# Patient Record
Sex: FEMALE | Race: WHITE | NOT HISPANIC OR LATINO | Employment: OTHER | URBAN - METROPOLITAN AREA
[De-identification: names, ages, dates, MRNs, and addresses within clinical notes are randomized per-mention and may not be internally consistent; named-entity substitution may affect disease eponyms.]

---

## 2017-04-10 ENCOUNTER — TELEPHONE (OUTPATIENT)
Dept: FAMILY MEDICINE | Facility: OTHER | Age: 77
End: 2017-04-10

## 2017-05-08 DIAGNOSIS — N18.30 CKD (CHRONIC KIDNEY DISEASE) STAGE 3, GFR 30-59 ML/MIN (H): ICD-10-CM

## 2017-05-08 DIAGNOSIS — I10 ESSENTIAL HYPERTENSION WITH GOAL BLOOD PRESSURE LESS THAN 140/90: ICD-10-CM

## 2017-05-08 DIAGNOSIS — E78.5 HYPERLIPIDEMIA, UNSPECIFIED: ICD-10-CM

## 2017-05-08 DIAGNOSIS — E11.9 TYPE 2 DIABETES MELLITUS WITHOUT COMPLICATION (H): ICD-10-CM

## 2017-05-08 LAB
ALBUMIN SERPL-MCNC: 3.3 G/DL (ref 3.4–5)
ALP SERPL-CCNC: 97 U/L (ref 40–150)
ALT SERPL W P-5'-P-CCNC: 20 U/L (ref 0–50)
ANION GAP SERPL CALCULATED.3IONS-SCNC: 9 MMOL/L (ref 3–14)
AST SERPL W P-5'-P-CCNC: 15 U/L (ref 0–45)
BILIRUB SERPL-MCNC: 0.4 MG/DL (ref 0.2–1.3)
BUN SERPL-MCNC: 23 MG/DL (ref 7–30)
CALCIUM SERPL-MCNC: 10.1 MG/DL (ref 8.5–10.1)
CHLORIDE SERPL-SCNC: 106 MMOL/L (ref 94–109)
CHOLEST SERPL-MCNC: 152 MG/DL
CO2 SERPL-SCNC: 28 MMOL/L (ref 20–32)
CREAT SERPL-MCNC: 1 MG/DL (ref 0.52–1.04)
CREAT UR-MCNC: 133 MG/DL
ERYTHROCYTE [DISTWIDTH] IN BLOOD BY AUTOMATED COUNT: 12.7 % (ref 10–15)
GFR SERPL CREATININE-BSD FRML MDRD: 54 ML/MIN/1.7M2
GLUCOSE SERPL-MCNC: 148 MG/DL (ref 70–99)
HBA1C MFR BLD: 6.9 % (ref 4.3–6)
HCT VFR BLD AUTO: 44 % (ref 35–47)
HDLC SERPL-MCNC: 69 MG/DL
HGB BLD-MCNC: 14.5 G/DL (ref 11.7–15.7)
LDLC SERPL CALC-MCNC: 61 MG/DL
MCH RBC QN AUTO: 30.7 PG (ref 26.5–33)
MCHC RBC AUTO-ENTMCNC: 33 G/DL (ref 31.5–36.5)
MCV RBC AUTO: 93 FL (ref 78–100)
MICROALBUMIN UR-MCNC: 30 MG/L
MICROALBUMIN/CREAT UR: 22.41 MG/G CR (ref 0–25)
NONHDLC SERPL-MCNC: 83 MG/DL
PLATELET # BLD AUTO: 222 10E9/L (ref 150–450)
POTASSIUM SERPL-SCNC: 3.8 MMOL/L (ref 3.4–5.3)
PROT SERPL-MCNC: 7.2 G/DL (ref 6.8–8.8)
RBC # BLD AUTO: 4.72 10E12/L (ref 3.8–5.2)
SODIUM SERPL-SCNC: 143 MMOL/L (ref 133–144)
TRIGL SERPL-MCNC: 108 MG/DL
WBC # BLD AUTO: 7.9 10E9/L (ref 4–11)

## 2017-05-08 PROCEDURE — 36415 COLL VENOUS BLD VENIPUNCTURE: CPT | Performed by: FAMILY MEDICINE

## 2017-05-08 PROCEDURE — 80061 LIPID PANEL: CPT | Performed by: FAMILY MEDICINE

## 2017-05-08 PROCEDURE — 83036 HEMOGLOBIN GLYCOSYLATED A1C: CPT | Performed by: FAMILY MEDICINE

## 2017-05-08 PROCEDURE — 82043 UR ALBUMIN QUANTITATIVE: CPT | Performed by: FAMILY MEDICINE

## 2017-05-08 PROCEDURE — 85027 COMPLETE CBC AUTOMATED: CPT | Performed by: FAMILY MEDICINE

## 2017-05-08 PROCEDURE — 80053 COMPREHEN METABOLIC PANEL: CPT | Performed by: FAMILY MEDICINE

## 2017-06-02 DIAGNOSIS — I25.83 CORONARY ARTERY DISEASE DUE TO LIPID RICH PLAQUE: ICD-10-CM

## 2017-06-02 DIAGNOSIS — E78.5 HYPERLIPIDEMIA LDL GOAL <100: ICD-10-CM

## 2017-06-02 DIAGNOSIS — I25.10 CORONARY ARTERY DISEASE DUE TO LIPID RICH PLAQUE: ICD-10-CM

## 2017-06-02 NOTE — TELEPHONE ENCOUNTER
atorvastatin (LIPITOR) 40 MG tablet     Last Written Prescription Date: 6/14/16  Last Fill Quantity: 90, # refills: 3  Last Office Visit with [facility], [facility] or [facility] prescribing provider: 9/23/16       Lab Results   Component Value Date    CHOL 152 05/08/2017     Lab Results   Component Value Date    HDL 69 05/08/2017     Lab Results   Component Value Date    LDL 61 05/08/2017     Lab Results   Component Value Date    TRIG 108 05/08/2017     Lab Results   Component Value Date    CHOLHDLRATIO 2.7 06/04/2015

## 2017-06-05 RX ORDER — ATORVASTATIN CALCIUM 40 MG/1
40 TABLET, FILM COATED ORAL DAILY
Qty: 90 TABLET | Refills: 0 | Status: SHIPPED | OUTPATIENT
Start: 2017-06-05 | End: 2017-08-30

## 2017-06-05 NOTE — TELEPHONE ENCOUNTER
Prescription approved per [facility] Refill Protocol.  Will need OV in sept.    Chuckie Hanks, RN, BSN

## 2017-06-14 DIAGNOSIS — I25.10 CORONARY ARTERY DISEASE DUE TO LIPID RICH PLAQUE: ICD-10-CM

## 2017-06-14 DIAGNOSIS — I25.83 CORONARY ARTERY DISEASE DUE TO LIPID RICH PLAQUE: ICD-10-CM

## 2017-06-14 NOTE — TELEPHONE ENCOUNTER
Atenolol      Last Written Prescription Date: 6/14/2016  Last Fill Quantity: 90, # refills: 3    Last Office Visit with G, [facility] or [facility] prescribing provider:  9/23/2016   Future Office Visit:  none      BP Readings from Last 3 Encounters:   09/23/16 122/60   07/05/16 126/60   05/18/16 134/60     Yuki Hamm MA

## 2017-06-15 RX ORDER — ATENOLOL 100 MG/1
TABLET ORAL
Qty: 90 TABLET | Refills: 0 | Status: SHIPPED | OUTPATIENT
Start: 2017-06-15 | End: 2017-09-12 | Stop reason: ALTCHOICE

## 2017-06-15 NOTE — TELEPHONE ENCOUNTER
Prescription approved per [facility] Refill Protocol.    Dariana Rinaldi, RN, BSN

## 2017-06-28 ENCOUNTER — TELEPHONE (OUTPATIENT)
Dept: FAMILY MEDICINE | Facility: OTHER | Age: 77
End: 2017-06-28

## 2017-06-28 DIAGNOSIS — N32.81 OVERACTIVE BLADDER: ICD-10-CM

## 2017-06-28 NOTE — TELEPHONE ENCOUNTER
ditropan      Last Written Prescription Date: 06/14/16  Last Fill Quantity: 90,  # refills: 3   Last Office Visit with G, [facility] or [facility] prescribing provider: 09/23/16

## 2017-06-28 NOTE — LETTER
[address]  [facility] [address]  [phone]        August 30, 2017    Loni Ybarra  [address] [facility] [address]              Dear Loni Ybarra    This is to remind you that your now zimmer to schedule your office visit.     You may call our office at [phone] to schedule an appointment.    Please disregard this notice if you have already had your labs drawn or made an appointment.        Sincerely,        Mackenzie Jerome MD/pk

## 2017-07-03 RX ORDER — OXYBUTYNIN CHLORIDE 10 MG/1
TABLET, EXTENDED RELEASE ORAL
Qty: 90 TABLET | Refills: 0 | Status: SHIPPED | OUTPATIENT
Start: 2017-07-03 | End: 2017-09-12

## 2017-07-03 NOTE — TELEPHONE ENCOUNTER
Prescription approved per [facility] Refill Protocol.  Due for OV in Sept.  Please call and help schedule.  Chuckie Hanks, RN, BSN

## 2017-07-05 NOTE — TELEPHONE ENCOUNTER
Several attempts made w/o success.  Not due until September.  Will postpone message until closer to be seen to be sure she schedules.

## 2017-08-21 DIAGNOSIS — I10 ESSENTIAL HYPERTENSION WITH GOAL BLOOD PRESSURE LESS THAN 140/90: ICD-10-CM

## 2017-08-21 NOTE — TELEPHONE ENCOUNTER
Losartan      Last Written Prescription Date: 6/14/2016  Last Fill Quantity: 90, # refills: 3  Last Office Visit with [facility], [facility] or [facility] prescribing provider: 9/23/2016       Potassium   Date Value Ref Range Status   05/08/2017 3.8 3.4 - 5.3 mmol/L Final     Creatinine   Date Value Ref Range Status   05/08/2017 1.00 0.52 - 1.04 mg/dL Final     BP Readings from Last 3 Encounters:   09/23/16 122/60   07/05/16 126/60   05/18/16 134/60       Hygroton      Last Written Prescription Date: 6/14/2016  Last Fill Quantity: 90, # refills: 3  Last Office Visit with [facility], [facility] or [facility] prescribing provider: 9/23/2016       Potassium   Date Value Ref Range Status   05/08/2017 3.8 3.4 - 5.3 mmol/L Final     Creatinine   Date Value Ref Range Status   05/08/2017 1.00 0.52 - 1.04 mg/dL Final     BP Readings from Last 3 Encounters:   09/23/16 122/60   07/05/16 126/60   05/18/16 134/60       Yuki Hamm MA

## 2017-08-21 NOTE — LETTER
[address]  [facility] [address] [phone]        August 30, 2017    Loni Ybarra  [address] [facility] [address]          Dear Loni,    We were unable to reach you by phone.  This is just a reminder that you are now due to come in for an office visit.  You can schedule this by calling us at your convenience at [phone].    Sincerely,        Mackenzie Jerome MD/pk

## 2017-08-22 RX ORDER — CHLORTHALIDONE 25 MG/1
TABLET ORAL
Qty: 90 TABLET | Refills: 3 | Status: SHIPPED | OUTPATIENT
Start: 2017-08-22 | End: 2018-05-25

## 2017-08-22 RX ORDER — LOSARTAN POTASSIUM 100 MG/1
TABLET ORAL
Qty: 90 TABLET | Refills: 3 | Status: SHIPPED | OUTPATIENT
Start: 2017-08-22 | End: 2018-05-25

## 2017-08-22 NOTE — TELEPHONE ENCOUNTER
Routing refill request to provider for review/approval because:  Appears to be a break in medication - should have been out around 6/14/17    Dariana Rinaldi, RN, BSN

## 2017-08-22 NOTE — TELEPHONE ENCOUNTER
Left detailed message stating that her losartan and hygroton have bee sent to the pharmacy for her.  Yuki Hamm MA

## 2017-08-30 DIAGNOSIS — I25.10 CORONARY ARTERY DISEASE DUE TO LIPID RICH PLAQUE: ICD-10-CM

## 2017-08-30 DIAGNOSIS — E78.5 HYPERLIPIDEMIA LDL GOAL <100: ICD-10-CM

## 2017-08-30 DIAGNOSIS — I25.83 CORONARY ARTERY DISEASE DUE TO LIPID RICH PLAQUE: ICD-10-CM

## 2017-08-30 NOTE — TELEPHONE ENCOUNTER
lipitor     Last Written Prescription Date: 06/05/17  Last Fill Quantity: 90, # refills: 0  Last Office Visit with G, P or [facility] prescribing provider: 09/23/16       Lab Results   Component Value Date    CHOL 152 05/08/2017     Lab Results   Component Value Date    HDL 69 05/08/2017     Lab Results   Component Value Date    LDL 61 05/08/2017     Lab Results   Component Value Date    TRIG 108 05/08/2017     Lab Results   Component Value Date    CHOLHDLRATIO 2.7 06/04/2015

## 2017-09-01 RX ORDER — ATORVASTATIN CALCIUM 40 MG/1
40 TABLET, FILM COATED ORAL DAILY
Qty: 30 TABLET | Refills: 0 | Status: SHIPPED | OUTPATIENT
Start: 2017-09-01 | End: 2017-09-12

## 2017-09-01 NOTE — TELEPHONE ENCOUNTER
atorvastatin (LIPITOR) 40 MG tablet  Routing refill request to provider for review/approval because:  Sharon given x1 and patient did not follow up, please advise  Patient needs to be seen because:  Due for OV    Nikki Raya RN, BSN

## 2017-09-08 NOTE — PROGRESS NOTES
SUBJECTIVE:                                                    Loni Ybarra is a 76 year old female who presents to clinic today for the following health issues:    HPI    Diabetes Follow-up       Patient is checking blood sugars: rarely since A1c in May was nearly normal     Diabetic concerns: None     Symptoms of hypoglycemia (low blood sugar): none     Paresthesias (numbness or burning in feet) or sores: No     Date of last diabetic eye exam: May or  of this year ([facility] [phone])    Hyperlipidemia Follow-Up      Rate your low fat/cholesterol diet?: good    Taking statin?  Yes, no muscle aches from statin    Other lipid medications/supplements?:  none    Hypertension Follow-up      Outpatient blood pressures are being checked at store.      Low Salt Diet: no added salt      Problem list and histories reviewed & adjusted, as indicated.  Additional history: as documented    Patient Active Problem List   Diagnosis     Hypertension, goal below 140/90     Hyperlipidemia, unspecified     Advanced directives, counseling/discussion     CAD (coronary artery disease)     Obesity     CKD (chronic kidney disease) stage 3, GFR 30-59 ml/min     Overactive bladder     CHACHO (obstructive sleep apnea)     Type 2 diabetes mellitus without complication (H)     Past Surgical History:   Procedure Laterality Date     C  DELIVERY ONLY      , Low Cervical     CARDIAC SURGERY  2011    2 stents placed     CHOLECYSTECTOMY       TONSILLECTOMY      child     TUBAL LIGATION         Social History   Substance Use Topics     Smoking status: Never Smoker     Smokeless tobacco: Never Used      Comment: no smokers in household     Alcohol use Yes      Comment: kvng x1/<1xper month     Family History   Problem Relation Age of Onset     C.A.D. Mother      3 vessel CABG     Hypertension Mother      ON MEDICATION     CEREBROVASCULAR DISEASE Mother      Arthritis Mother      Eye Disorder Mother   "    CATARACT     GASTROINTESTINAL DISEASE Mother      ULCER     Lipids Mother      Thyroid Disease Mother      ?     CANCER Father      LIVER CANCER     Allergies Brother      SEASONAL     Lipids Brother      CHACHO     Respiratory Brother      ASTHMA     CANCER Brother      prostate             ROS:  C: NEGATIVE for fever, chills, change in weight  E/M: NEGATIVE for ear, mouth and throat problems  R: NEGATIVE for significant cough or SOB  CV: NEGATIVE for chest pain, palpitations or peripheral edema    OBJECTIVE:     /80 (BP Location: Left arm, Patient Position: Chair, Cuff Size: Adult Large)  Pulse 78  Temp 98.1  F (36.7  C) (Oral)  Resp 18  Ht 5' 3.89\" (1.623 m)  Wt 195 lb (88.5 kg)  BMI 33.59 kg/m2  Body mass index is 33.59 kg/(m^2).  Gen: no apparent distress  NECK: no adenopathy, no asymmetry, no masses  Chest: clear to auscultation without wheeze, rale or rhonchi  Cor: regular rate and rhythm without murmur  ABDOMEN: soft, nontender, no masses and bowel sounds normal  Ext: warm and dry without edema  Psych: Alert and oriented times 3; coherent speech, normal   rate and volume, able to articulate logical thoughts, able   to abstract reason, no tangential thoughts, no hallucinations   or delusions  Her affect is bright.    ASSESSMENT/PLAN:     BMI:   Estimated body mass index is 33.59 kg/(m^2) as calculated from the following:    Height as of this encounter: 5' 3.89\" (1.623 m).    Weight as of this encounter: 195 lb (88.5 kg).   Weight management plan: Discussed healthy diet and exercise guidelines and patient will follow up in 6 months in clinic to re-evaluate.    1. Type 2 diabetes mellitus without complication, without long-term current use of insulin (H)  NOt checking sugars often, will check A1C today.    - FOOT EXAM  NO CHARGE [38532.114]  - Hemoglobin A1c    2. Coronary artery disease due to lipid rich plaque  Denies chest pain or angina.    - metoprolol (TOPROL-XL) 50 MG 24 hr tablet; Take 1 " tablet (50 mg) by mouth daily  Dispense: 90 tablet; Refill: 3  - atorvastatin (LIPITOR) 40 MG tablet; Take 1 tablet (40 mg) by mouth daily Overdue for appointment  Dispense: 90 tablet; Refill: 3    3. Overactive bladder  Stable.    - oxybutynin (DITROPAN-XL) 10 MG 24 hr tablet; Take 1 tablet (10 mg) by mouth daily  Dispense: 90 tablet; Refill: 3    4. Morbid obesity, unspecified obesity type (H)  Discussed exercise.    5. CKD (chronic kidney disease) stage 3, GFR 30-59 ml/min  BP controlled, avoid NSAIDs.    6. Hypertension, goal below 140/90  Well controlled    - metoprolol (TOPROL-XL) 50 MG 24 hr tablet; Take 1 tablet (50 mg) by mouth daily  Dispense: 90 tablet; Refill: 3    7. Hyperlipidemia, unspecified  Remains on statin    8. CHACHO (obstructive sleep apnea)  Continues with CPAP.    9. Need for prophylactic vaccination and inoculation against influenza      Mackenzie BRIDGETT Jerome MD  [facility]

## 2017-09-12 ENCOUNTER — OFFICE VISIT (OUTPATIENT)
Dept: FAMILY MEDICINE | Facility: OTHER | Age: 77
End: 2017-09-12
Payer: MEDICARE

## 2017-09-12 VITALS
TEMPERATURE: 98.1 F | SYSTOLIC BLOOD PRESSURE: 126 MMHG | RESPIRATION RATE: 18 BRPM | WEIGHT: 195 LBS | HEIGHT: 64 IN | HEART RATE: 78 BPM | DIASTOLIC BLOOD PRESSURE: 80 MMHG | BODY MASS INDEX: 33.29 KG/M2

## 2017-09-12 DIAGNOSIS — I25.10 CORONARY ARTERY DISEASE DUE TO LIPID RICH PLAQUE: ICD-10-CM

## 2017-09-12 DIAGNOSIS — N32.81 OVERACTIVE BLADDER: ICD-10-CM

## 2017-09-12 DIAGNOSIS — G47.33 OSA (OBSTRUCTIVE SLEEP APNEA): ICD-10-CM

## 2017-09-12 DIAGNOSIS — E11.9 TYPE 2 DIABETES MELLITUS WITHOUT COMPLICATION, WITHOUT LONG-TERM CURRENT USE OF INSULIN (H): Primary | ICD-10-CM

## 2017-09-12 DIAGNOSIS — I25.83 CORONARY ARTERY DISEASE DUE TO LIPID RICH PLAQUE: ICD-10-CM

## 2017-09-12 DIAGNOSIS — E66.01 MORBID OBESITY, UNSPECIFIED OBESITY TYPE (H): ICD-10-CM

## 2017-09-12 DIAGNOSIS — N18.30 CKD (CHRONIC KIDNEY DISEASE) STAGE 3, GFR 30-59 ML/MIN (H): ICD-10-CM

## 2017-09-12 DIAGNOSIS — I10 HYPERTENSION, GOAL BELOW 140/90: ICD-10-CM

## 2017-09-12 DIAGNOSIS — Z23 NEED FOR PROPHYLACTIC VACCINATION AND INOCULATION AGAINST INFLUENZA: ICD-10-CM

## 2017-09-12 DIAGNOSIS — E78.5 HYPERLIPIDEMIA, UNSPECIFIED: ICD-10-CM

## 2017-09-12 LAB — HBA1C MFR BLD: 7.2 % (ref 4.3–6)

## 2017-09-12 PROCEDURE — 99207 C FOOT EXAM  NO CHARGE: CPT | Mod: 25 | Performed by: FAMILY MEDICINE

## 2017-09-12 PROCEDURE — 83036 HEMOGLOBIN GLYCOSYLATED A1C: CPT | Performed by: FAMILY MEDICINE

## 2017-09-12 PROCEDURE — 99214 OFFICE O/P EST MOD 30 MIN: CPT | Mod: 25 | Performed by: FAMILY MEDICINE

## 2017-09-12 PROCEDURE — 90662 IIV NO PRSV INCREASED AG IM: CPT | Performed by: FAMILY MEDICINE

## 2017-09-12 PROCEDURE — 36415 COLL VENOUS BLD VENIPUNCTURE: CPT | Performed by: FAMILY MEDICINE

## 2017-09-12 PROCEDURE — G0008 ADMIN INFLUENZA VIRUS VAC: HCPCS | Performed by: FAMILY MEDICINE

## 2017-09-12 RX ORDER — OXYBUTYNIN CHLORIDE 10 MG/1
10 TABLET, EXTENDED RELEASE ORAL DAILY
Qty: 90 TABLET | Refills: 3 | Status: SHIPPED | OUTPATIENT
Start: 2017-09-12 | End: 2018-09-19

## 2017-09-12 RX ORDER — METOPROLOL SUCCINATE 50 MG/1
50 TABLET, EXTENDED RELEASE ORAL DAILY
Qty: 90 TABLET | Refills: 3 | Status: SHIPPED | OUTPATIENT
Start: 2017-09-12 | End: 2017-12-15

## 2017-09-12 RX ORDER — ATORVASTATIN CALCIUM 40 MG/1
40 TABLET, FILM COATED ORAL DAILY
Qty: 90 TABLET | Refills: 3 | Status: SHIPPED | OUTPATIENT
Start: 2017-09-12 | End: 2018-09-20

## 2017-09-12 NOTE — MR AVS SNAPSHOT
After Visit Summary   9/12/2017    Loni Ybarra    MRN: [id number]           Patient Information     Date Of Birth          1940        Visit Information        Provider Department      9/12/2017 11:40 AM Mackenzie Jerome MD [facility]        Today's Diagnoses     Type 2 diabetes mellitus without complication, without long-term current use of insulin (H)    -  1    Coronary artery disease due to lipid rich plaque        Overactive bladder        Morbid obesity, unspecified obesity type (H)        CKD (chronic kidney disease) stage 3, GFR 30-59 ml/min        Hypertension, goal below 140/90        Hyperlipidemia, unspecified        CHACHO (obstructive sleep apnea)        Need for prophylactic vaccination and inoculation against influenza           Follow-ups after your visit        Who to contact     If you have questions or need follow up information about today's clinic visit or your schedule please contact [facility] directly at [phone].  Normal or non-critical lab and imaging results will be communicated to you by [facility], letter or phone within 4 business days after the clinic has received the results. If you do not hear from us within 7 days, please contact the clinic through [facility] or phone. If you have a critical or abnormal lab result, we will notify you by phone as soon as possible.  Submit refill requests through [facility] or call your pharmacy and they will forward the refill request to us. Please allow 3 business days for your refill to be completed.          Additional Information About Your Visit        MyChart Information     [facility] gives you secure access to your electronic health record. If you see a primary care provider, you can also send messages to your care team and make appointments. If you have questions, please call your primary care clinic.  If you do not have a primary care provider, please call [phone] and they will  "assist you.        Care EveryWhere ID     This is your Care EveryWhere ID. This could be used by other organizations to access your [place] medical records  [id number]        Your Vitals Were     Pulse Temperature Respirations Height BMI (Body Mass Index)       78 98.1  F (36.7  C) (Oral) 18 5' 3.89\" (1.623 m) 33.59 kg/m2        Blood Pressure from Last 3 Encounters:   09/12/17 126/80   09/23/16 122/60   07/05/16 126/60    Weight from Last 3 Encounters:   09/12/17 195 lb (88.5 kg)   10/04/16 196 lb (88.9 kg)   09/23/16 196 lb (88.9 kg)              We Performed the Following     FOOT EXAM  NO CHARGE [31066.114]     Hemoglobin A1c          Today's Medication Changes          These changes are accurate as of: 9/12/17 12:18 PM.  If you have any questions, ask your nurse or doctor.               Start taking these medicines.        Dose/Directions    metoprolol 50 MG 24 hr tablet   Commonly known as:  TOPROL-XL   Used for:  Coronary artery disease due to lipid rich plaque, Hypertension, goal below 140/90   Started by:  Mackenzie Jerome MD        Dose:  50 mg   Take 1 tablet (50 mg) by mouth daily   Quantity:  90 tablet   Refills:  3         These medicines have changed or have updated prescriptions.        Dose/Directions    oxybutynin 10 MG 24 hr tablet   Commonly known as:  DITROPAN-XL   This may have changed:  See the new instructions.   Used for:  Overactive bladder   Changed by:  Mackenzie Jerome MD        Dose:  10 mg   Take 1 tablet (10 mg) by mouth daily   Quantity:  90 tablet   Refills:  3         Stop taking these medicines if you haven't already. Please contact your care team if you have questions.     atenolol 100 MG tablet   Commonly known as:  TENORMIN   Stopped by:  Mackenzie Jerome MD                Where to get your medicines      These medications were sent to [facility] - [place], [place] - [address]  [address], [place] [address]     Phone:  [phone]    "  atorvastatin 40 MG tablet    metoprolol 50 MG 24 hr tablet    oxybutynin 10 MG 24 hr tablet                Primary Care Provider Office Phone # Fax #    Mackenzie BAILEY MD [place] [phone]       [address] [place] [address] [address]  [facility] [address]        Equal Access to Services     LOUIS SALEEM : Hadii jeanie ku hadasho Soomaali, waaxda luqadaha, qaybta kaalmada adeegyada, waxay maxxin hayajith flowerdianelysatilio cuba. So [facility] [phone].    ATENCIÓN: Si habla español, tiene a johnson disposición servicios gratuitos de asistencia lingüística. [facility] [phone].    We comply with applicable federal civil rights laws and [place] laws. We do not discriminate on the basis of race, color, national origin, age, disability sex, sexual orientation or gender identity.            Thank you!     Thank you for choosing [facility]  for your care. Our goal is always to provide you with excellent care. Hearing back from our patients is one way we can continue to improve our services. Please take a few minutes to complete the written survey that you may receive in the mail after your visit with us. Thank you!             Your Updated Medication List - Protect others around you: Learn how to safely use, store and throw away your medicines at www.disposemymeds.org.          This list is accurate as of: 9/12/17 12:18 PM.  Always use your most recent med list.                   Brand Name Dispense Instructions for use Diagnosis    aspirin 325 MG tablet     90 tablet    Take 1 tablet by mouth daily.        atorvastatin 40 MG tablet    LIPITOR    90 tablet    Take 1 tablet (40 mg) by mouth daily Overdue for appointment    Coronary artery disease due to lipid rich plaque       blood glucose monitoring test strip    no brand specified    100 each    Use to test blood sugars once daily or as directed    Newly diagnosed diabetes (H)       chlorhexidine 0.12 % solution    PERIDEX     Swish and spit 15 mLs in mouth 2 times  daily        chlorthalidone 25 MG tablet    HYGROTON    90 tablet    TAKE 1 TABLET BY MOUTH DAILY    Essential hypertension with goal blood pressure less than 140/90       losartan 100 MG tablet    COZAAR    90 tablet    TAKE 1 TABLET BY MOUTH DAILY    Essential hypertension with goal blood pressure less than 140/90       metoprolol 50 MG 24 hr tablet    TOPROL-XL    90 tablet    Take 1 tablet (50 mg) by mouth daily    Coronary artery disease due to lipid rich plaque, Hypertension, goal below 140/90       nitroGLYcerin 0.4 MG sublingual tablet    NITROSTAT    10 tablet    Place 1 tablet (0.4 mg) under the tongue every 5 minutes as needed    Coronary artery disease due to lipid rich plaque       ONE-A-DAY CALCIUM PLUS PO      Take 1 tablet by mouth        oxybutynin 10 MG 24 hr tablet    DITROPAN-XL    90 tablet    Take 1 tablet (10 mg) by mouth daily    Overactive bladder

## 2017-09-12 NOTE — PROGRESS NOTES
Injectable Influenza Immunization Documentation    1.  Are you sick today? (Fever of 100.5 or higher on the day of the clinic)   No    2.  Have you ever had Guillain-[place] Syndrome within 6 weeks of an influenza vaccionation?  No    3. Do you have a life-threatening allergy to eggs?  No    4. Do you have a life-threatening allergy to a component of the vaccine? May include antibiotics, gelatin or latex.  No     5. Have you ever had a reaction to a dose of flu vaccine that needed immediate medical attention?  No     Form completed by Loni Ybarra

## 2017-09-12 NOTE — NURSING NOTE
"Chief Complaint   Patient presents with     Recheck Medication     Panel Management     height, flu, fall risk, honoring choices, eye exam, foot exam, DM EDU       Initial /80 (BP Location: Left arm, Patient Position: Chair, Cuff Size: Adult Large)  Pulse 78  Temp 98.1  F (36.7  C) (Oral)  Resp 18  Ht 5' 3.89\" (1.623 m)  Wt 195 lb (88.5 kg)  BMI 33.59 kg/m2 Estimated body mass index is 33.59 kg/(m^2) as calculated from the following:    Height as of this encounter: 5' 3.89\" (1.623 m).    Weight as of this encounter: 195 lb (88.5 kg).  Medication Reconciliation: complete    "

## 2017-09-12 NOTE — NURSING NOTE
Prior to injection verified patient identity using patient's name and date of birth.Patient instructed to remain in clinic for 20 minutes afterwards, and to report any adverse reaction to me immediately. Elma Laguerre CMA

## 2017-10-14 ENCOUNTER — NURSE TRIAGE (OUTPATIENT)
Dept: NURSING | Facility: CLINIC | Age: 77
End: 2017-10-14

## 2017-10-14 NOTE — TELEPHONE ENCOUNTER
Started with frequency of urine about 3-4 days ago. Denies other symptoms at this time.   Diana Aleman RN, [facility]    Reason for Disposition    Urinating more frequently than usual (i.e., frequency)    Additional Information    Negative: Shock suspected (e.g., cold/pale/clammy skin, too weak to stand, low BP, rapid pulse)    Negative: Sounds like a life-threatening emergency to the triager    Negative: Followed a genital area injury    Negative: Followed a genital area injury (penis, scrotum)    Negative: Vaginal discharge    Negative: Pus (white, yellow) or bloody discharge from end of penis    Negative: [1] Taking antibiotic for urinary tract infection (UTI) AND [2] female    Negative: [1] Taking antibiotic for urinary tract infection (UTI) AND [2] male    Negative: [1] Discomfort (pain, burning or stinging) when passing urine AND [2] pregnant    Negative: [1] Discomfort (pain, burning or stinging) when passing urine AND [2] postpartum < 1 month    Negative: [1] Discomfort (pain, burning or stinging) when passing urine AND [2] female    Negative: [1] Discomfort (pain, burning or stinging) when passing urine AND [2] male    Negative: Pain or itching in the vulvar area    Negative: Pain in scrotum is main symptom    Negative: Blood in the urine is main symptom    Negative: Symptoms arising from use of a urinary catheter (Frank or Coude)    Negative: [1] Unable to urinate (or only a few drops) > 4 hours AND     [2] bladder feels very full (e.g., palpable bladder or strong urge to urinate)    Negative: [1] Decreased urination and [2] drinking very little AND [2] dehydration suspected (e.g., dark urine, no urine > 12 hours, very dry mouth, very lightheaded)    Negative: Patient sounds very sick or weak to the triager    Negative: Fever > 100.5 F (38.1 C)    Negative: Side (flank) or lower back pain present    Negative: [1] Can't control passage of urine (i.e., urinary incontinence) AND [2] new onset (< 2  weeks) or worsening    Protocols used: URINARY SYMPTOMS-ADULT-AH

## 2017-12-15 ENCOUNTER — TELEPHONE (OUTPATIENT)
Dept: FAMILY MEDICINE | Facility: OTHER | Age: 77
End: 2017-12-15

## 2017-12-15 DIAGNOSIS — I25.10 CORONARY ARTERY DISEASE DUE TO LIPID RICH PLAQUE: ICD-10-CM

## 2017-12-15 DIAGNOSIS — I25.83 CORONARY ARTERY DISEASE DUE TO LIPID RICH PLAQUE: ICD-10-CM

## 2017-12-15 RX ORDER — ATENOLOL 100 MG/1
TABLET ORAL
Qty: 90 TABLET | Refills: 3 | Status: SHIPPED | OUTPATIENT
Start: 2017-12-15 | End: 2018-09-21

## 2017-12-15 NOTE — TELEPHONE ENCOUNTER
Pt calling. She  her Atorvastatin and Atenolol and they were quite expensive. She is wondering if there is an alternative that may be cheaper? Or does she really need to be taking them? Please call.  Thank you,  Pennie Sullivan- Pt Rep.

## 2017-12-15 NOTE — TELEPHONE ENCOUNTER
Is it atenolol or metoprolol? It looks like she was changed to metoprolol in September. If the metoprolol is expensive, we can go back to atenolol. I would recommend PA for the Lipitor as she has been on this for years and needs to be on both medications.    Pieter Mathis PA-C

## 2017-12-15 NOTE — TELEPHONE ENCOUNTER
It was the metoprolol that was too expensive please send over atenolol, pharmacy entered, and then we will route to the PA department.

## 2017-12-15 NOTE — TELEPHONE ENCOUNTER
Unsure if there is an alternative or if this needs a PA? Please review/advise in TC absence. These were rx'd in 09/2017.

## 2018-03-19 NOTE — TELEPHONE ENCOUNTER
Attempted to contact pt, no answer and no VM.  Will attempt again later  Elma Laguerre CMA     
Reason for call:  Pt scheduled for lab 5/8 for A1C.  She would like TC to order any other labs for her to have done at that time if any are needed.  Please call to advise.  thanks    
She already has future orders placed for all of these (I placed these future orders last September)  Please let her know.  
Spoke to pt, informed her of message below.   
Will route to provider to review and advise. Looks like BMP, hgb, lipids are due 5/23/17. Rosario Tony, CMA    
(0) understands/communicates without difficulty

## 2018-05-09 DIAGNOSIS — I10 ESSENTIAL HYPERTENSION WITH GOAL BLOOD PRESSURE LESS THAN 140/90: ICD-10-CM

## 2018-05-10 RX ORDER — LOSARTAN POTASSIUM 100 MG/1
TABLET ORAL
Qty: 90 TABLET | OUTPATIENT
Start: 2018-05-10

## 2018-05-10 RX ORDER — CHLORTHALIDONE 25 MG/1
TABLET ORAL
Qty: 90 TABLET | OUTPATIENT
Start: 2018-05-10

## 2018-05-10 NOTE — TELEPHONE ENCOUNTER
Refill not appropriate.  Rx sent to the requesting pharmacy on 8/22/17 for a 3 month supply with an additional 3 refills. Pt won't need refills until August, 2018      Sandra Sanchez RN

## 2018-05-17 NOTE — PROGRESS NOTES
SUBJECTIVE:   Loni Ybarra is a 77 year old female who presents to clinic today for the following health issues:      History of Present Illness     CKD:     NSAID use::  Ibuprofen (Motrin)    Diabetes:     Frequency of checking blood sugars::  Other (ran out of test strips)    Diabetic concerns::  None    Hypoglycemia symptoms::  None    Paraesthesia present::  No    Eye Exam in the last year::  NO (Getting this done today)    Hyperlipidemia:     Low fat/chol diet rating::  Fair    Taking Statins::  YES    Side effects from hypolipidemia medication::  No muscle aches from Statin    Lipid Medications or Supplements::  None    Hypertension:     Outpatient blood pressures:  Are being checked (results have been good)    Blood pressures checked at:  Home    Dietary sodium intake::  Low salt diet  Frequency of exercise:  None  Taking medications regularly:  Yes  Additional concerns today:  No    Answers for HPI/ROS submitted by the patient on 2018   PHQ-2 Score: 0    Problem list and histories reviewed & adjusted, as indicated.  Additional history: as documented    Patient Active Problem List   Diagnosis     Hypertension, goal below 140/90     Hyperlipidemia, unspecified     Advanced directives, counseling/discussion     CAD (coronary artery disease)     Obesity     CKD (chronic kidney disease) stage 3, GFR 30-59 ml/min     Overactive bladder     CHACHO (obstructive sleep apnea)     Type 2 diabetes mellitus with stage 3 chronic kidney disease, without long-term current use of insulin (H)     Past Surgical History:   Procedure Laterality Date     C  DELIVERY ONLY      , Low Cervical     CARDIAC SURGERY  2011    2 stents placed     CHOLECYSTECTOMY  2008     TONSILLECTOMY      child     TUBAL LIGATION         Social History   Substance Use Topics     Smoking status: Never Smoker     Smokeless tobacco: Never Used      Comment: no smokers in household     Alcohol use Yes      Comment: kvng  "x1/<1xper month     Family History   Problem Relation Age of Onset     C.A.D. Mother      3 vessel CABG     Hypertension Mother      ON MEDICATION     CEREBROVASCULAR DISEASE Mother      Arthritis Mother      Eye Disorder Mother      CATARACT     GASTROINTESTINAL DISEASE Mother      ULCER     Lipids Mother      Thyroid Disease Mother      ?     CANCER Father      LIVER CANCER     Allergies Brother      SEASONAL     Lipids Brother      CHACHO     Respiratory Brother      ASTHMA     CANCER Brother      prostate           ROS:  CONSTITUTIONAL: NEGATIVE for fever, chills, change in weight  ENT/MOUTH: NEGATIVE for ear, mouth and throat problems  RESP: NEGATIVE for significant cough or shortness of breath   CV: NEGATIVE for chest pain, palpitations or peripheral edema  MSK:  right leg had buckling/sciatica when in [place], now since back in [place], the pain has resolved and no further buckling    OBJECTIVE:     /62  Pulse 64  Temp 97.8  F (36.6  C) (Temporal)  Ht 5' 3.5\" (1.613 m)  Wt 198 lb (89.8 kg)  SpO2 97%  Breastfeeding? No  BMI 34.52 kg/m2  Body mass index is 34.52 kg/(m^2).  Gen: no apparent distress  NECK: no adenopathy, no asymmetry, no masses  Chest: clear to auscultation without wheeze, rale or rhonchi  Cor: regular rate and rhythm without murmur  ABDOMEN: soft, nontender, no masses and bowel sounds normal  Ext: warm and dry without edema  Psych: Alert and oriented times 3; coherent speech, normal   rate and volume, able to articulate logical thoughts, able   to abstract reason, no tangential thoughts, no hallucinations   or delusions  Her affect is neutral    Diabetic foot exam: normal DP and PT pulses, no trophic changes or ulcerative lesions, normal sensory exam and normal monofilament exam     ASSESSMENT/PLAN:     BMI:   Estimated body mass index is 34.52 kg/(m^2) as calculated from the following:    Height as of this encounter: 5' 3.5\" (1.613 m).    Weight as of this encounter: 198 lb (89.8 kg). "   Weight management plan: Discussed healthy diet and exercise guidelines and patient will follow up in 6 months in clinic to re-evaluate.      1. Type 2 diabetes mellitus with stage 3 chronic kidney disease, without long-term current use of insulin (H)  We will check labs today.    - HEMOGLOBIN A1C  - Lipid panel reflex to direct LDL Non-fasting  - Albumin Random Urine Quantitative with Creat Ratio  - Comprehensive metabolic panel  - blood glucose monitoring (NO BRAND SPECIFIED) test strip; Use to test blood sugars once daily or as directed  Dispense: 100 each; Refill: 1  - thin (NO BRAND SPECIFIED) lancets; Use with lanceting device. To accompany: Blood Glucose Monitor Brands: per insurance.  Dispense: 100 each; Refill: 3    2. CKD (chronic kidney disease) stage 3, GFR 30-59 ml/min  Blood pressure controlled.  Will check labs.    - Hemoglobin  - Lipid panel reflex to direct LDL Non-fasting  - Albumin Random Urine Quantitative with Creat Ratio  - Comprehensive metabolic panel    3. Hyperlipidemia, unspecified hyperlipidemia type  Remains on statin.    - Lipid panel reflex to direct LDL Non-fasting  - Comprehensive metabolic panel    4. Essential hypertension with goal blood pressure less than 140/90  Well-controlled.    - chlorthalidone (HYGROTON) 25 MG tablet; Take 1 tablet (25 mg) by mouth daily  Dispense: 90 tablet; Refill: 3  - losartan (COZAAR) 100 MG tablet; Take 1 tablet (100 mg) by mouth daily  Dispense: 90 tablet; Refill: 3      Mackenzie Jerome MD  [facility]

## 2018-05-25 ENCOUNTER — TRANSFERRED RECORDS (OUTPATIENT)
Dept: HEALTH INFORMATION MANAGEMENT | Facility: CLINIC | Age: 78
End: 2018-05-25

## 2018-05-25 ENCOUNTER — OFFICE VISIT (OUTPATIENT)
Dept: FAMILY MEDICINE | Facility: OTHER | Age: 78
End: 2018-05-25
Payer: MEDICARE

## 2018-05-25 VITALS
WEIGHT: 198 LBS | SYSTOLIC BLOOD PRESSURE: 118 MMHG | OXYGEN SATURATION: 97 % | HEART RATE: 64 BPM | HEIGHT: 64 IN | DIASTOLIC BLOOD PRESSURE: 62 MMHG | TEMPERATURE: 97.8 F | BODY MASS INDEX: 33.8 KG/M2

## 2018-05-25 DIAGNOSIS — N18.30 TYPE 2 DIABETES MELLITUS WITH STAGE 3 CHRONIC KIDNEY DISEASE, WITHOUT LONG-TERM CURRENT USE OF INSULIN (H): Primary | ICD-10-CM

## 2018-05-25 DIAGNOSIS — E11.22 TYPE 2 DIABETES MELLITUS WITH STAGE 3 CHRONIC KIDNEY DISEASE, WITHOUT LONG-TERM CURRENT USE OF INSULIN (H): Primary | ICD-10-CM

## 2018-05-25 DIAGNOSIS — E78.5 HYPERLIPIDEMIA, UNSPECIFIED HYPERLIPIDEMIA TYPE: ICD-10-CM

## 2018-05-25 DIAGNOSIS — I10 ESSENTIAL HYPERTENSION WITH GOAL BLOOD PRESSURE LESS THAN 140/90: ICD-10-CM

## 2018-05-25 DIAGNOSIS — N18.30 CKD (CHRONIC KIDNEY DISEASE) STAGE 3, GFR 30-59 ML/MIN (H): ICD-10-CM

## 2018-05-25 LAB
ALBUMIN SERPL-MCNC: 3.4 G/DL (ref 3.4–5)
ALP SERPL-CCNC: 95 U/L (ref 40–150)
ALT SERPL W P-5'-P-CCNC: 24 U/L (ref 0–50)
ANION GAP SERPL CALCULATED.3IONS-SCNC: 7 MMOL/L (ref 3–14)
AST SERPL W P-5'-P-CCNC: 19 U/L (ref 0–45)
BILIRUB SERPL-MCNC: 0.5 MG/DL (ref 0.2–1.3)
BUN SERPL-MCNC: 23 MG/DL (ref 7–30)
CALCIUM SERPL-MCNC: 10.2 MG/DL (ref 8.5–10.1)
CHLORIDE SERPL-SCNC: 104 MMOL/L (ref 94–109)
CHOLEST SERPL-MCNC: 162 MG/DL
CO2 SERPL-SCNC: 30 MMOL/L (ref 20–32)
CREAT SERPL-MCNC: 1.13 MG/DL (ref 0.52–1.04)
CREAT UR-MCNC: 117 MG/DL
GFR SERPL CREATININE-BSD FRML MDRD: 47 ML/MIN/1.7M2
GLUCOSE SERPL-MCNC: 115 MG/DL (ref 70–99)
HBA1C MFR BLD: 7.7 % (ref 0–5.6)
HDLC SERPL-MCNC: 55 MG/DL
HGB BLD-MCNC: 14.3 G/DL (ref 11.7–15.7)
LDLC SERPL CALC-MCNC: 70 MG/DL
MICROALBUMIN UR-MCNC: 9 MG/L
MICROALBUMIN/CREAT UR: 7.43 MG/G CR (ref 0–25)
NONHDLC SERPL-MCNC: 107 MG/DL
POTASSIUM SERPL-SCNC: 4.3 MMOL/L (ref 3.4–5.3)
PROT SERPL-MCNC: 7.2 G/DL (ref 6.8–8.8)
SODIUM SERPL-SCNC: 141 MMOL/L (ref 133–144)
TRIGL SERPL-MCNC: 186 MG/DL

## 2018-05-25 PROCEDURE — 83036 HEMOGLOBIN GLYCOSYLATED A1C: CPT | Performed by: FAMILY MEDICINE

## 2018-05-25 PROCEDURE — 80053 COMPREHEN METABOLIC PANEL: CPT | Performed by: FAMILY MEDICINE

## 2018-05-25 PROCEDURE — 36415 COLL VENOUS BLD VENIPUNCTURE: CPT | Performed by: FAMILY MEDICINE

## 2018-05-25 PROCEDURE — 99214 OFFICE O/P EST MOD 30 MIN: CPT | Performed by: FAMILY MEDICINE

## 2018-05-25 PROCEDURE — 80061 LIPID PANEL: CPT | Performed by: FAMILY MEDICINE

## 2018-05-25 PROCEDURE — 82043 UR ALBUMIN QUANTITATIVE: CPT | Performed by: FAMILY MEDICINE

## 2018-05-25 PROCEDURE — 85018 HEMOGLOBIN: CPT | Performed by: FAMILY MEDICINE

## 2018-05-25 RX ORDER — LOSARTAN POTASSIUM 100 MG/1
100 TABLET ORAL DAILY
Qty: 90 TABLET | Refills: 3 | Status: SHIPPED | OUTPATIENT
Start: 2018-05-25 | End: 2019-05-21

## 2018-05-25 RX ORDER — CHLORTHALIDONE 25 MG/1
25 TABLET ORAL DAILY
Qty: 90 TABLET | Refills: 3 | Status: SHIPPED | OUTPATIENT
Start: 2018-05-25 | End: 2019-06-04

## 2018-05-25 RX ORDER — LOSARTAN POTASSIUM 100 MG/1
100 TABLET ORAL DAILY
Qty: 90 TABLET | Refills: 3 | Status: SHIPPED | OUTPATIENT
Start: 2018-05-25 | End: 2018-05-25

## 2018-05-25 RX ORDER — LANCETS
EACH MISCELLANEOUS
Qty: 100 EACH | Refills: 3 | Status: SHIPPED | OUTPATIENT
Start: 2018-05-25 | End: 2019-06-04

## 2018-05-25 RX ORDER — CHLORTHALIDONE 25 MG/1
25 TABLET ORAL DAILY
Qty: 90 TABLET | Refills: 3 | Status: SHIPPED | OUTPATIENT
Start: 2018-05-25 | End: 2018-05-25

## 2018-05-25 NOTE — MR AVS SNAPSHOT
After Visit Summary   5/25/2018    Loni Ybarra    MRN: [id number]           Patient Information     Date Of Birth          1940        Visit Information        Provider Department      5/25/2018 1:20 PM Mackenzie Jerome MD [facility]        Today's Diagnoses     Type 2 diabetes mellitus with stage 3 chronic kidney disease, without long-term current use of insulin (H)    -  1    CKD (chronic kidney disease) stage 3, GFR 30-59 ml/min        Hyperlipidemia, unspecified hyperlipidemia type        Essential hypertension with goal blood pressure less than 140/90           Follow-ups after your visit        Who to contact     If you have questions or need follow up information about today's clinic visit or your schedule please contact [facility] directly at [phone].  Normal or non-critical lab and imaging results will be communicated to you by MyChart, letter or phone within 4 business days after the clinic has received the results. If you do not hear from us within 7 days, please contact the clinic through MyChart or phone. If you have a critical or abnormal lab result, we will notify you by phone as soon as possible.  Submit refill requests through [employer] or call your pharmacy and they will forward the refill request to us. Please allow 3 business days for your refill to be completed.          Additional Information About Your Visit        MyChart Information     [employer] gives you secure access to your electronic health record. If you see a primary care provider, you can also send messages to your care team and make appointments. If you have questions, please call your primary care clinic.  If you do not have a primary care provider, please call [phone] and they will assist you.        Care EveryWhere ID     This is your Care EveryWhere ID. This could be used by other organizations to access your [place] medical records  [id number]        Your  "Vitals Were     Pulse Temperature Height Pulse Oximetry Breastfeeding? BMI (Body Mass Index)    64 97.8  F (36.6  C) (Temporal) 5' 3.5\" (1.613 m) 97% No 34.52 kg/m2       Blood Pressure from Last 3 Encounters:   05/25/18 118/62   09/12/17 126/80   09/23/16 122/60    Weight from Last 3 Encounters:   05/25/18 198 lb (89.8 kg)   09/12/17 195 lb (88.5 kg)   10/04/16 196 lb (88.9 kg)              We Performed the Following     Albumin Random Urine Quantitative with Creat Ratio     Comprehensive metabolic panel     HEMOGLOBIN A1C     Hemoglobin     Lipid panel reflex to direct LDL Non-fasting          Today's Medication Changes          These changes are accurate as of 5/25/18  2:00 PM.  If you have any questions, ask your nurse or doctor.               Start taking these medicines.        Dose/Directions    chlorthalidone 25 MG tablet   Commonly known as:  HYGROTON   Used for:  Essential hypertension with goal blood pressure less than 140/90   Started by:  Mackenzie Jerome MD        Dose:  25 mg   Take 1 tablet (25 mg) by mouth daily   Quantity:  90 tablet   Refills:  3       losartan 100 MG tablet   Commonly known as:  COZAAR   Used for:  Essential hypertension with goal blood pressure less than 140/90   Started by:  Mackenzie Jerome MD        Dose:  100 mg   Take 1 tablet (100 mg) by mouth daily   Quantity:  90 tablet   Refills:  3       thin lancets   Commonly known as:  NO BRAND SPECIFIED   Used for:  Type 2 diabetes mellitus with stage 3 chronic kidney disease, without long-term current use of insulin (H)   Started by:  Mackenzie Jerome MD        Use with lanceting device. To accompany: Blood Glucose Monitor Brands: per insurance.   Quantity:  100 each   Refills:  3            Where to get your medicines      These medications were sent to [facility] - [place], [place] - [address]  [address], [place] [address]     Phone:  [phone]     blood glucose monitoring test strip "    chlorthalidone 25 MG tablet    losartan 100 MG tablet    thin lancets                Primary Care Provider Office Phone # Fax #    Mackenzie BAILEY MD [place] [phone]       [address] [place] [address] [address]  [facility] [address]        Equal Access to Services     [place] SALEEM : Hadii jeanie cárdenas hadadeleo Soomaali, waaxda luqadaha, qaybta kaalmada adeegyada, waxblayne lillyin haydmitryn connie flowerdianelysatilio cuba. So [facility] [phone].    ATENCIÓN: Si habla español, tiene a johnson disposición servicios gratuitos de asistencia lingüística. Llame al [phone].    We comply with applicable federal civil rights laws and [place] laws. We do not discriminate on the basis of race, color, national origin, age, disability, sex, sexual orientation, or gender identity.            Thank you!     Thank you for choosing [facility]  for your care. Our goal is always to provide you with excellent care. Hearing back from our patients is one way we can continue to improve our services. Please take a few minutes to complete the written survey that you may receive in the mail after your visit with us. Thank you!             Your Updated Medication List - Protect others around you: Learn how to safely use, store and throw away your medicines at www.disposemymeds.org.          This list is accurate as of 5/25/18  2:00 PM.  Always use your most recent med list.                   Brand Name Dispense Instructions for use Diagnosis    aspirin 325 MG tablet     90 tablet    Take 1 tablet by mouth daily.        atenolol 100 MG tablet    TENORMIN    90 tablet    TAKE 1 TABLET (100 MG) BY MOUTH DAILY    Coronary artery disease due to lipid rich plaque       atorvastatin 40 MG tablet    LIPITOR    90 tablet    Take 1 tablet (40 mg) by mouth daily Overdue for appointment    Coronary artery disease due to lipid rich plaque       blood glucose monitoring test strip    no brand specified    100 each    Use to test blood sugars once daily or as  directed    Type 2 diabetes mellitus with stage 3 chronic kidney disease, without long-term current use of insulin (H)       chlorhexidine 0.12 % solution    PERIDEX     Swish and spit 15 mLs in mouth 2 times daily        chlorthalidone 25 MG tablet    HYGROTON    90 tablet    Take 1 tablet (25 mg) by mouth daily    Essential hypertension with goal blood pressure less than 140/90       losartan 100 MG tablet    COZAAR    90 tablet    Take 1 tablet (100 mg) by mouth daily    Essential hypertension with goal blood pressure less than 140/90       nitroGLYcerin 0.4 MG sublingual tablet    NITROSTAT    10 tablet    Place 1 tablet (0.4 mg) under the tongue every 5 minutes as needed    Coronary artery disease due to lipid rich plaque       ONE-A-DAY CALCIUM PLUS PO      Take 1 tablet by mouth        oxybutynin 10 MG 24 hr tablet    DITROPAN-XL    90 tablet    Take 1 tablet (10 mg) by mouth daily    Overactive bladder       thin lancets    NO BRAND SPECIFIED    100 each    Use with lanceting device. To accompany: Blood Glucose Monitor Brands: per insurance.    Type 2 diabetes mellitus with stage 3 chronic kidney disease, without long-term current use of insulin (H)

## 2018-09-19 DIAGNOSIS — I10 HYPERTENSION, GOAL BELOW 140/90: Primary | ICD-10-CM

## 2018-09-19 DIAGNOSIS — N32.81 OVERACTIVE BLADDER: ICD-10-CM

## 2018-09-20 DIAGNOSIS — I25.10 CORONARY ARTERY DISEASE DUE TO LIPID RICH PLAQUE: ICD-10-CM

## 2018-09-20 DIAGNOSIS — I25.83 CORONARY ARTERY DISEASE DUE TO LIPID RICH PLAQUE: ICD-10-CM

## 2018-09-20 RX ORDER — ATORVASTATIN CALCIUM 40 MG/1
TABLET, FILM COATED ORAL
Qty: 90 TABLET | Refills: 2 | Status: SHIPPED | OUTPATIENT
Start: 2018-09-20 | End: 2019-06-04

## 2018-09-20 RX ORDER — OXYBUTYNIN CHLORIDE 10 MG/1
TABLET, EXTENDED RELEASE ORAL
Qty: 90 TABLET | Refills: 0 | Status: SHIPPED | OUTPATIENT
Start: 2018-09-20 | End: 2018-12-15

## 2018-09-20 NOTE — TELEPHONE ENCOUNTER
"Requested Prescriptions   Pending Prescriptions Disp Refills     atorvastatin (LIPITOR) 40 MG tablet [Pharmacy Med Name: ATORVASTATIN 40MG TABLET] 90 tablet      Sig: TAKE 1 TABLET BY MOUTH DAILY    Statins Protocol Passed    9/20/2018 10:22 AM       Passed - LDL on file in past 12 months    Recent Labs   Lab Test  05/25/18   1401   LDL  70            Passed - No abnormal creatine kinase in past 12 months    No lab results found.            Passed - Recent (12 mo) or future (30 days) visit within the authorizing provider's specialty    Patient had office visit in the last 12 months or has a visit in the next 30 days with authorizing provider or within the authorizing provider's specialty.  See \"Patient Info\" tab in inbasket, or \"Choose Columns\" in Meds & Orders section of the refill encounter.           Passed - Patient is age 18 or older       Passed - No active pregnancy on record       Passed - No positive pregnancy test in past 12 months        Last OV 05/25/2018  Last filled 09/12/2017    Prescription approved per [facility] Refill Protocol..  Chuckie Hanks, RN, BSN          "

## 2018-09-21 RX ORDER — METOPROLOL SUCCINATE 50 MG/1
TABLET, EXTENDED RELEASE ORAL
Qty: 90 TABLET | Refills: 2 | Status: SHIPPED | OUTPATIENT
Start: 2018-09-21 | End: 2019-06-04

## 2018-09-21 NOTE — TELEPHONE ENCOUNTER
I am fine with metoprolol, I just needed to know what medication she was actually taking.  I took atenolol off her medication list and filled her metoprolol for her.

## 2018-09-21 NOTE — TELEPHONE ENCOUNTER
Dr. Jerome - do you prefer her being on Atenolol or Metoprolol?    Called and spoke with patient.     According to notes from December of 2017 she was switched to Atenolol due to cost. But patient reports she has been taking Metoprolol 50 mg - and not the Atenolol 100 mg.     Jose Maria Santiago, RN, BSN

## 2018-09-21 NOTE — TELEPHONE ENCOUNTER
Please clarify with patient, I thought she was off metoprolol and back on atenolol.  This may have been an automatic refill from pharmacy.

## 2018-12-15 DIAGNOSIS — N32.81 OVERACTIVE BLADDER: ICD-10-CM

## 2018-12-17 RX ORDER — OXYBUTYNIN CHLORIDE 10 MG/1
TABLET, EXTENDED RELEASE ORAL
Qty: 90 TABLET | Refills: 1 | Status: SHIPPED | OUTPATIENT
Start: 2018-12-17 | End: 2019-06-04

## 2019-05-20 ENCOUNTER — TRANSFERRED RECORDS (OUTPATIENT)
Dept: HEALTH INFORMATION MANAGEMENT | Facility: CLINIC | Age: 79
End: 2019-05-20

## 2019-05-21 DIAGNOSIS — I10 ESSENTIAL HYPERTENSION WITH GOAL BLOOD PRESSURE LESS THAN 140/90: ICD-10-CM

## 2019-05-21 RX ORDER — LOSARTAN POTASSIUM 100 MG/1
TABLET ORAL
Qty: 90 TABLET | Refills: 0 | Status: SHIPPED | OUTPATIENT
Start: 2019-05-21 | End: 2019-06-04

## 2019-06-04 ENCOUNTER — OFFICE VISIT (OUTPATIENT)
Dept: FAMILY MEDICINE | Facility: OTHER | Age: 79
End: 2019-06-04
Payer: MEDICARE

## 2019-06-04 VITALS
BODY MASS INDEX: 34.7 KG/M2 | DIASTOLIC BLOOD PRESSURE: 70 MMHG | TEMPERATURE: 97.2 F | WEIGHT: 199 LBS | OXYGEN SATURATION: 97 % | SYSTOLIC BLOOD PRESSURE: 114 MMHG | HEART RATE: 52 BPM | RESPIRATION RATE: 14 BRPM

## 2019-06-04 DIAGNOSIS — R41.3 MEMORY LOSS: ICD-10-CM

## 2019-06-04 DIAGNOSIS — I10 HYPERTENSION, GOAL BELOW 140/90: ICD-10-CM

## 2019-06-04 DIAGNOSIS — M25.561 RIGHT KNEE PAIN, UNSPECIFIED CHRONICITY: ICD-10-CM

## 2019-06-04 DIAGNOSIS — N32.81 OVERACTIVE BLADDER: ICD-10-CM

## 2019-06-04 DIAGNOSIS — I25.10 CORONARY ARTERY DISEASE DUE TO LIPID RICH PLAQUE: ICD-10-CM

## 2019-06-04 DIAGNOSIS — I25.83 CORONARY ARTERY DISEASE DUE TO LIPID RICH PLAQUE: ICD-10-CM

## 2019-06-04 DIAGNOSIS — N18.30 TYPE 2 DIABETES MELLITUS WITH STAGE 3 CHRONIC KIDNEY DISEASE, WITHOUT LONG-TERM CURRENT USE OF INSULIN (H): ICD-10-CM

## 2019-06-04 DIAGNOSIS — N18.30 CKD (CHRONIC KIDNEY DISEASE) STAGE 3, GFR 30-59 ML/MIN (H): ICD-10-CM

## 2019-06-04 DIAGNOSIS — Z00.00 ENCOUNTER FOR ROUTINE ADULT HEALTH EXAMINATION WITHOUT ABNORMAL FINDINGS: Primary | ICD-10-CM

## 2019-06-04 DIAGNOSIS — E11.22 TYPE 2 DIABETES MELLITUS WITH STAGE 3 CHRONIC KIDNEY DISEASE, WITHOUT LONG-TERM CURRENT USE OF INSULIN (H): ICD-10-CM

## 2019-06-04 LAB
ALBUMIN SERPL-MCNC: 3.3 G/DL (ref 3.4–5)
ALP SERPL-CCNC: 102 U/L (ref 40–150)
ALT SERPL W P-5'-P-CCNC: 23 U/L (ref 0–50)
ANION GAP SERPL CALCULATED.3IONS-SCNC: 5 MMOL/L (ref 3–14)
AST SERPL W P-5'-P-CCNC: 17 U/L (ref 0–45)
BILIRUB SERPL-MCNC: 0.5 MG/DL (ref 0.2–1.3)
BUN SERPL-MCNC: 25 MG/DL (ref 7–30)
CALCIUM SERPL-MCNC: 10.5 MG/DL (ref 8.5–10.1)
CHLORIDE SERPL-SCNC: 105 MMOL/L (ref 94–109)
CHOLEST SERPL-MCNC: 163 MG/DL
CO2 SERPL-SCNC: 30 MMOL/L (ref 20–32)
CREAT SERPL-MCNC: 1.06 MG/DL (ref 0.52–1.04)
CREAT UR-MCNC: 126 MG/DL
GFR SERPL CREATININE-BSD FRML MDRD: 50 ML/MIN/{1.73_M2}
GLUCOSE SERPL-MCNC: 184 MG/DL (ref 70–99)
HBA1C MFR BLD: 8.3 % (ref 0–5.6)
HDLC SERPL-MCNC: 63 MG/DL
LDLC SERPL CALC-MCNC: 73 MG/DL
MICROALBUMIN UR-MCNC: 34 MG/L
MICROALBUMIN/CREAT UR: 26.83 MG/G CR (ref 0–25)
NONHDLC SERPL-MCNC: 100 MG/DL
POTASSIUM SERPL-SCNC: 3.9 MMOL/L (ref 3.4–5.3)
PROT SERPL-MCNC: 7.4 G/DL (ref 6.8–8.8)
SODIUM SERPL-SCNC: 140 MMOL/L (ref 133–144)
TRIGL SERPL-MCNC: 134 MG/DL
TSH SERPL DL<=0.005 MIU/L-ACNC: 1 MU/L (ref 0.4–4)
VIT B12 SERPL-MCNC: 518 PG/ML (ref 193–986)

## 2019-06-04 PROCEDURE — 82607 VITAMIN B-12: CPT | Performed by: FAMILY MEDICINE

## 2019-06-04 PROCEDURE — 99213 OFFICE O/P EST LOW 20 MIN: CPT | Mod: 25 | Performed by: FAMILY MEDICINE

## 2019-06-04 PROCEDURE — 80053 COMPREHEN METABOLIC PANEL: CPT | Performed by: FAMILY MEDICINE

## 2019-06-04 PROCEDURE — 82043 UR ALBUMIN QUANTITATIVE: CPT | Performed by: FAMILY MEDICINE

## 2019-06-04 PROCEDURE — 84443 ASSAY THYROID STIM HORMONE: CPT | Performed by: FAMILY MEDICINE

## 2019-06-04 PROCEDURE — G0438 PPPS, INITIAL VISIT: HCPCS | Performed by: FAMILY MEDICINE

## 2019-06-04 PROCEDURE — 36415 COLL VENOUS BLD VENIPUNCTURE: CPT | Performed by: FAMILY MEDICINE

## 2019-06-04 PROCEDURE — 80061 LIPID PANEL: CPT | Performed by: FAMILY MEDICINE

## 2019-06-04 PROCEDURE — 83036 HEMOGLOBIN GLYCOSYLATED A1C: CPT | Performed by: FAMILY MEDICINE

## 2019-06-04 RX ORDER — LANCETS
EACH MISCELLANEOUS
Qty: 100 EACH | Refills: 3 | Status: SHIPPED | OUTPATIENT
Start: 2019-06-04 | End: 2020-09-01

## 2019-06-04 RX ORDER — OXYBUTYNIN CHLORIDE 10 MG/1
10 TABLET, EXTENDED RELEASE ORAL DAILY
Qty: 90 TABLET | Refills: 3 | Status: SHIPPED | OUTPATIENT
Start: 2019-06-04 | End: 2019-08-16

## 2019-06-04 RX ORDER — LOSARTAN POTASSIUM 100 MG/1
100 TABLET ORAL DAILY
Qty: 90 TABLET | Refills: 3 | Status: SHIPPED | OUTPATIENT
Start: 2019-06-04 | End: 2020-06-24

## 2019-06-04 RX ORDER — ATORVASTATIN CALCIUM 40 MG/1
40 TABLET, FILM COATED ORAL DAILY
Qty: 90 TABLET | Refills: 3 | Status: SHIPPED | OUTPATIENT
Start: 2019-06-04 | End: 2020-06-24

## 2019-06-04 RX ORDER — METOPROLOL SUCCINATE 50 MG/1
50 TABLET, EXTENDED RELEASE ORAL DAILY
Qty: 90 TABLET | Refills: 3 | Status: SHIPPED | OUTPATIENT
Start: 2019-06-04 | End: 2020-06-23

## 2019-06-04 RX ORDER — CHLORTHALIDONE 25 MG/1
25 TABLET ORAL DAILY
Qty: 90 TABLET | Refills: 3 | Status: SHIPPED | OUTPATIENT
Start: 2019-06-04 | End: 2020-06-24

## 2019-06-04 ASSESSMENT — ENCOUNTER SYMPTOMS
HEMATURIA: 0
ABDOMINAL PAIN: 0
EYE PAIN: 0
COUGH: 0
CHILLS: 0
BREAST MASS: 0
DIARRHEA: 0
HEMATOCHEZIA: 0
CONSTIPATION: 0
DIZZINESS: 0
FEVER: 0
FREQUENCY: 0
NERVOUS/ANXIOUS: 0
HEADACHES: 0

## 2019-06-04 ASSESSMENT — ACTIVITIES OF DAILY LIVING (ADL): CURRENT_FUNCTION: NO ASSISTANCE NEEDED

## 2019-06-04 NOTE — PATIENT INSTRUCTIONS
You could benefit from pelvic floor rehabilitation for urinary incontinence.  You want to check to see if this is available in [place] or [place].  If so, let me know and I can do a referral.  If not, then I can refer you to [facility].    You had difficulty with the clock drawing, I recommend further testing and would recommend neuropsychometric testing through [facility] in [place].  They will contact you.      Patient Education   Personalized Prevention Plan  You are due for the preventive services outlined below.  Your care team is available to assist you in scheduling these services.  If you have already completed any of these items, please share that information with your care team to update in your medical record.  Health Maintenance Due   Topic Date Due     Zoster (Shingles) Vaccine (1 of 2) 11/18/1989     Annual Wellness Visit  11/18/2004     Thyroid Function Lab  08/02/2018     A1C Lab  11/25/2018     Basic Metabolic Panel  05/25/2019     Cholesterol Lab  05/25/2019     Kidney Function Urine Test  05/25/2019     Diabetic Foot Exam  05/25/2019       Urinary Incontinence, Female (Adult)  Urinary incontinence means loss of control of the bladder. This problem affects many women, especially as they get older. If you have incontinence, you may be embarrassed to ask for help. But know that this problem can be treated.  Types of Incontinence  There are different types of incontinence. Two of the main types are described here. You can have more than one type.    Stress incontinence. With this type, urine leaks when pressure (stress) is put on the bladder. This may happen when you cough, sneeze, or laugh. Stress incontinence most often occurs because the pelvic floor muscles that support the bladder and urethra are weak. This can happen after pregnancy and vaginal childbirth or a hysterectomy. It can also be due to excess body weight or hormone changes.    Urge incontinence (also called overactive bladder). With  this type, a sudden urge to urinate is felt often. This may happen even though there may not be much urine in the bladder. The need to urinate often during the night is common. Urge incontinence most often occurs because of bladder spasms. This may be due to bladder irritation or infection. Damage to bladder nerves or pelvic muscles, constipation, and certain medicines can also lead to urge incontinence.  Treatment of urinary incontinence depends on the cause. Further evaluation is needed to find the type you have. This will likely include an exam and certain tests. Based on the results, you and your healthcare provider can then plan treatment. Until a diagnosis is made, the home care tips below can help relieve symptoms.  Home care    Do pelvic floor muscle exercises, if they are prescribed. The pelvic floor muscles help support the bladder and urethra. Many women find that their symptoms improve when doing special exercises that strengthen these muscles. To do the exercises contract the muscles you would use to stop your stream of urine, but do this when you re not urinating. Hold for 10 seconds, then relax. Repeat 10 to 20 times in a row, at least 3 times a day. Your provider may give you other instructions for how to do the exercises and how often.    Keep a bladder diary. This helps track how often and how much you urinate over a set period of time. Bring this diary with you to your next visit with the provider. The information can help your provider learn more about your bladder problem.    Lose weight, if advised to by your provider. Excess weight puts pressure on the bladder. Your provider can help you create a weight-loss plan that s right for you. This may include exercising more and making certain diet changes.    Don't consume foods and drinks that may irritate the bladder. These can include alcohol and caffeinated drinks.    Quit smoking. Smoking and other tobacco use can lead to chronic cough that  strains the pelvic floor muscles. Smoking may also damage the bladder and urethra. Talk with your provider about treatments or methods you can use to quit smoking.    If drinking large amounts of fluid causes you to have symptoms, you may be advised to limit your fluid intake. You may also be advised to drink most of your fluids during the day and to limit fluids at night.    If you re worried about urine leakage or accidents, you may wear absorbent pads to catch urine. Change the pads often. This helps reduce discomfort. It may also reduce the risk of skin or bladder infections.  Follow-up care  Follow up with your healthcare provider, or as directed. It may take some to find the right treatment for your problem. Your treatment plan may include special therapies or medicines. Certain procedures or surgery may also be options. Be sure to discuss any questions you have with your provider.  When to seek medical advice  Call the healthcare provider right away if any of these occur:    Fever of 100.4 F (38 C) or higher, or as directed by your provider    Bladder pain or fullness    Abdominal swelling    Nausea or vomiting    Back pain    Weakness, dizziness or fainting  Date Last Reviewed: 10/1/2017    1917-8281 [employer]. [address] [place] [address]. All rights reserved. This information is not intended as a substitute for professional medical care. Always follow your healthcare professional's instructions.

## 2019-06-11 ENCOUNTER — TELEPHONE (OUTPATIENT)
Dept: FAMILY MEDICINE | Facility: OTHER | Age: 79
End: 2019-06-11

## 2019-06-11 DIAGNOSIS — R32 URINARY INCONTINENCE, UNSPECIFIED TYPE: ICD-10-CM

## 2019-06-11 DIAGNOSIS — N32.81 OVERACTIVE BLADDER: Primary | ICD-10-CM

## 2019-06-11 RX ORDER — GLIPIZIDE 5 MG/1
5 TABLET, FILM COATED, EXTENDED RELEASE ORAL DAILY
Qty: 90 TABLET | Refills: 0 | Status: SHIPPED | OUTPATIENT
Start: 2019-06-11 | End: 2019-09-03

## 2019-06-11 NOTE — TELEPHONE ENCOUNTER
Reason for Call: Request for an order or referral:    Order or referral being requested:  Patient already has referral for Physical therapy in [place] for her knee.  They said they could also do PT for her pelvic problems.  Please add this to the referral.  Pelvic floor rehab for urinary incontinence.  And fax referral to   [phone].    Date needed: as soon as possible    Has the patient been seen by the PCP for this problem? YES    Additional comments: none    Phone number Patient can be reached at:  Cell number on file:    Telephone Information:   Mobile [phone]       Best Time:  any    Can we leave a detailed message on this number?  YES    Call taken on 6/11/2019 at 4:12 PM by Megan Treviño

## 2019-06-12 ENCOUNTER — TELEPHONE (OUTPATIENT)
Dept: FAMILY MEDICINE | Facility: OTHER | Age: 79
End: 2019-06-12

## 2019-06-12 NOTE — TELEPHONE ENCOUNTER
"Patient informed. No further questions.    Result note: \"Notes recorded by Mackenzie Jerome MD on 6/11/2019 at 5:28 PM CDT  Thyroid, cholesterol and B12 are normal.  Her diabetes is worsened, I would like to add a medication for her diabetes, I sent over Rx for glipizide.  Calcium is elevated, I would like her to stop her calcium supplements and follow up in 3 months for recheck on diabetes and calcium.\"  "
Reason for Call:  Other prescription    Detailed comments: patient said she got a call from the pharmacy saying her medication, Glipizide, was ready to . Why is she on this? She does not know what this is for    Phone Number Patient can be reached at: [phone]    Best Time:     Can we leave a detailed message on this number? NO    Call taken on 6/12/2019 at 12:42 PM by Sandra Mccoy      
Yes

## 2019-06-13 ENCOUNTER — TELEPHONE (OUTPATIENT)
Dept: FAMILY MEDICINE | Facility: OTHER | Age: 79
End: 2019-06-13

## 2019-06-13 NOTE — TELEPHONE ENCOUNTER
Reason for Call:  Form, our goal is to have forms completed with 72 hours, however, some forms may require a visit or additional information.    Type of letter, form or note:  medical    Who is the form from?: [facility] (if other please explain)    Where did the form come from: form was faxed in    What clinic location was the form placed at?: [facility] - [phone]    Where the form was placed:  Box/Folder    What number is listed as a contact on the form?: [phone]       Additional comments: sign fax back    Call taken on 6/13/2019 at 9:47 AM by Stella Wing

## 2019-06-14 ENCOUNTER — TRANSFERRED RECORDS (OUTPATIENT)
Dept: HEALTH INFORMATION MANAGEMENT | Facility: CLINIC | Age: 79
End: 2019-06-14

## 2019-06-17 ENCOUNTER — TRANSFERRED RECORDS (OUTPATIENT)
Dept: HEALTH INFORMATION MANAGEMENT | Facility: CLINIC | Age: 79
End: 2019-06-17

## 2019-06-18 ENCOUNTER — TELEPHONE (OUTPATIENT)
Dept: FAMILY MEDICINE | Facility: OTHER | Age: 79
End: 2019-06-18

## 2019-06-18 NOTE — TELEPHONE ENCOUNTER
Reason for Call:  Form, our goal is to have forms completed with 72 hours, however, some forms may require a visit or additional information.    Type of letter, form or note:  medical    Who is the form from?: [facility] (if other please explain)    Where did the form come from: form was faxed in    What clinic location was the form placed at?: [facility] - [phone]    Where the form was placed: drs Box/Folder    What number is listed as a contact on the form?: [phone]       Additional comments: Please review sign and fax bacl to [phone]    Call taken on 6/18/2019 at 12:53 PM by Marino Negron

## 2019-07-02 ENCOUNTER — TRANSFERRED RECORDS (OUTPATIENT)
Dept: HEALTH INFORMATION MANAGEMENT | Facility: CLINIC | Age: 79
End: 2019-07-02

## 2019-07-02 LAB — PHQ9 SCORE: 3

## 2019-07-16 ENCOUNTER — TELEPHONE (OUTPATIENT)
Dept: FAMILY MEDICINE | Facility: OTHER | Age: 79
End: 2019-07-16

## 2019-07-16 NOTE — TELEPHONE ENCOUNTER
Just received neuropsychometric testing results, I would like her to make an appointment with me to discuss.

## 2019-07-16 NOTE — TELEPHONE ENCOUNTER
Spoke to patient and informed her of message below.  She is scheduled to see Dr. JACKSON on 8/2/19

## 2019-07-23 NOTE — PROGRESS NOTES
Subjective     Loni Ybarra is a 78 year old female who presents to clinic today for the following health issues:    HPI   Patient was seen at [facility] for neuropsychological testing, here to follow up on those results.  She remembers going to the testing and that it took several hours.  She does not quite recall what they said aside from she had some mild changes.  She does not want her  to know any of the results as she feels that he would become very anxious and upset by them.  She does feel that she will be able to tell 1 of her friends.  We discussed the concerns for her memory with unable to be drawing a clock.  We discussed the results of the neuropsychometric testing in detail which did result in mild neurocognitive disorder.  This was concerning for early Alzheimer's.    Patient Active Problem List   Diagnosis     Hypertension, goal below 140/90     Hyperlipidemia, unspecified     Advanced directives, counseling/discussion     CAD (coronary artery disease)     Obesity     CKD (chronic kidney disease) stage 3, GFR 30-59 ml/min (H)     Overactive bladder     CHACHO (obstructive sleep apnea)     Type 2 diabetes mellitus with stage 3 chronic kidney disease, without long-term current use of insulin (H)     Past Surgical History:   Procedure Laterality Date     C  DELIVERY ONLY      , Low Cervical     CARDIAC SURGERY  2011    2 stents placed     CHOLECYSTECTOMY       TONSILLECTOMY      child     TUBAL LIGATION         Social History     Tobacco Use     Smoking status: Never Smoker     Smokeless tobacco: Never Used     Tobacco comment: no smokers in household   Substance Use Topics     Alcohol use: Yes     Comment: kvng x1/<1xper month     Family History   Problem Relation Age of Onset     C.A.D. Mother         3 vessel CABG     Hypertension Mother         ON MEDICATION     Cerebrovascular Disease Mother      Arthritis Mother      Eye Disorder Mother          CATARACT     Gastrointestinal Disease Mother         ULCER     Lipids Mother      Thyroid Disease Mother         ?     Cancer Father         LIVER CANCER     Allergies Brother         SEASONAL     Lipids Brother         CHACHO     Respiratory Brother         ASTHMA     Cancer Brother         prostate     Alzheimer Disease Maternal Aunt      Alzheimer Disease Cousin            Reviewed and updated as needed this visit by Provider  Meds  Problems         Review of Systems   ROS COMP: CONSTITUTIONAL: NEGATIVE for fever, chills, change in weight  ENT/MOUTH: NEGATIVE for ear, mouth and throat problems  RESP: NEGATIVE for significant cough or shortness of breath  CV: NEGATIVE for chest pain, palpitations or peripheral edema  NEURO: NEGATIVE for weakness, dizziness or paresthesias      Objective    /60 (BP Location: Left arm, Patient Position: Chair, Cuff Size: Adult Large)   Pulse 70   Temp 97.6  F (36.4  C) (Temporal)   Resp 14   Wt 88.9 kg (196 lb)   SpO2 96%   BMI 34.18 kg/m    Body mass index is 34.18 kg/m .  Physical Exam   Gen: no apparent distress  NECK: no adenopathy, no asymmetry, no masses  Chest: clear to auscultation without wheeze, rale or rhonchi  Cor: regular rate and rhythm without murmur  ABDOMEN: soft, nontender, no masses and bowel sounds normal  Ext: warm and dry without edema  Psych: Alert and oriented times 3; coherent speech, normal   rate and volume, able to articulate logical thoughts, able   to abstract reason, no tangential thoughts, no hallucinations   or delusions  Her affect is bright    Six Item Cognitive Impairment Test   (6CIT):      What year is it?                               Correct - 0 points    What month is it?                               Correct - 0 points      Give the patient an address to remember with five components:   [place] ( first and last name - 2 components)   [address]  (number and name of street - 2 components)   [place] ( [facility] - 1  component)      About what time is it (within the hour)? Correct - 0 points    Count backwards from 20 to 1:   Correct - 0 points    Say the months of the year in reverse: Correct - 0 points    Repeat the address phrase:   2 errors - 4 points    Total 6CIT Score:      4/28    Interpretation: The 6CIT uses an inverse score and questions are weighted to produce a total out of 28. Scores of 0-7 are considered normal and 8 or more significant.    Advantages The test has high sensitivity without compromising specificity even in mild dementia. It is easy to translate linguistically and culturally.  Disadvantages The main disadvantage is in the scoring and weighting of the test, which is initially confusing, however computer models have simplified this greatly.    Probability Statistics: At the 7/8 cut off: Overall figures sensitivity 90% specificity 100%, in mild dementia sensitivity = 78% , specificity = 100%    Copyright 2000 [facility]. Courtesy of Dr. José Reagan         Assessment & Plan     1. Mild neurocognitive disorder  Patient is unable to draw a clock.  She does have a normal 6CIT. she underwent neural psychometric testing was diagnosed with mild neurocognitive disorder.  She does not want her  to know.  She thinks she may tell a friend.  At this time I feel she is able to make decisions for herself.  It is noted that she has mild hypercalcemia, which could be related to her chlorthalidone.  At this time I asked her to stop her calcium supplements and recheck at her next visit.  We will also screen for possible vascular disorder by obtaining an MRI of her brain.  We discussed starting Aricept as her symptoms are concerning for early Alzheimer's disease.  She is agreeable to this.  Will start Aricept 5 mg at bedtime for 1 month followed by 10 mg at bedtime.    - MR Brain w/o & w Contrast; Future  - donepezil (ARICEPT) 5 MG tablet; Take 1 tablet (5 mg) by mouth At Bedtime   Dispense: 30 tablet; Refill: 0  - donepezil (ARICEPT) 10 MG tablet; Take 1 tablet (10 mg) by mouth At Bedtime Start after finishes the 5 mg tablets  Dispense: 60 tablet; Refill: 0     Return in about 2 months (around 10/13/2019) for memory loss.    Mackenzie Jerome MD  [facility]

## 2019-08-09 ENCOUNTER — TELEPHONE (OUTPATIENT)
Dept: FAMILY MEDICINE | Facility: OTHER | Age: 79
End: 2019-08-09

## 2019-08-09 DIAGNOSIS — E83.52 HYPERCALCEMIA: Primary | ICD-10-CM

## 2019-08-09 NOTE — TELEPHONE ENCOUNTER
After Visit Summary   6/21/2018    Mily Grullon    MRN: [id number]           Patient Information     Date Of Birth          1980        Visit Information        Provider Department      6/21/2018 11:20 AM Ramsey Willis PT [facility] [place] [facility]        Today's Diagnoses     Closed nondisplaced fracture of body of left calcaneus with delayed healing, subsequent encounter        Heel pain, chronic, left           Follow-ups after your visit        Your next 10 appointments already scheduled     Jun 22, 2018  1:00 PM CDT   New Visit with Koby Michael MD   [facility] ([facility])    [address] [address]   [phone]            Jun 28, 2018 10:10 AM CDT   CLEO Extremity with Edilia Rivera PTA   [facility] [place] [facility] ([facility] [place])    [address]  [address]  [place] [address]   [phone]            Jul 05, 2018 10:00 AM CDT   CLEO Extremity with Ramsey Gamboa PTA   [facility] [place] [facility] ([facility] [place])    [address]  [address]  [place] [address]   [phone]            Jul 12, 2018 10:00 AM CDT   CLEO Extremity with Ramsey Gamboa PTA   [facility] [place] [facility] ([facility] [place])    [address]  [address]  [place] [address]   [phone]            Jul 16, 2018  9:00 AM CDT   Return Visit with Jose Alford MD   [facility] [place] ([facility] [place])    [address]  [address]  [place] [address]   [phone]            Jul 19, 2018 10:00 AM CDT   CLEO Extremity with Ramsey Willis PT   [facility] [place] [facility] ([facility] [place])    [address]  [address]  [place] [address]   [phone]              Who to contact     If you have questions or need follow up information about today's clinic visit or  This patient is on our lab schedule for Tuesday, August 13th.    Please place any orders you would like completed.    Thank you,  Lina   your schedule please contact [facility] directly at [phone].  Normal or non-critical lab and imaging results will be communicated to you by MyChart, letter or phone within 4 business days after the clinic has received the results. If you do not hear from us within 7 days, please contact the clinic through MyChart or phone. If you have a critical or abnormal lab result, we will notify you by phone as soon as possible.  Submit refill requests through [employer] or call your pharmacy and they will forward the refill request to us. Please allow 3 business days for your refill to be completed.          Additional Information About Your Visit        Care EveryWhere ID     This is your Care EveryWhere ID. This could be used by other organizations to access your [place] medical records  [id number]         Blood Pressure from Last 3 Encounters:   06/04/18 137/86   05/18/18 130/87   05/16/18 130/87    Weight from Last 3 Encounters:   06/04/18 90.6 kg (199 lb 11.2 oz)   05/18/18 88.5 kg (195 lb)   05/16/18 88.5 kg (195 lb)              We Performed the Following     NEUROMUSCULAR RE-EDUCATION     THERAPEUTIC EXERCISES        Primary Care Provider Office Phone # Fax #    Kristen M Kehr, PA-C [phone] [address] [address] [facility] [address]        Equal Access to Services     JASSON VIZCARRA : Hadii aad ku hadasho Soomaali, waaxda luqadaha, qaybta kaalmada adeegyada, abdifatah fitzgerald . So [facility] [phone].    ATENCIÓN: Si habla español, tiene a lentz disposición servicios gratuitos de asistencia lingüística. Rosa al [phone].    We comply with applicable federal civil rights laws and [place] laws. We do not discriminate on the basis of race, color, national origin, age, disability, sex, sexual orientation, or gender identity.            Thank you!     Thank you for choosing [facility]  for your care. Our goal is  always to provide you with excellent care. Hearing back from our patients is one way we can continue to improve our services. Please take a few minutes to complete the written survey that you may receive in the mail after your visit with us. Thank you!             Your Updated Medication List - Protect others around you: Learn how to safely use, store and throw away your medicines at www.disposemymeds.org.          This list is accurate as of 6/21/18  1:19 PM.  Always use your most recent med list.                   Brand Name Dispense Instructions for use Diagnosis    acetaminophen 325 MG tablet    TYLENOL    100 tablet    Take 2 tablets (650 mg) by mouth every 4 hours as needed for other (mild pain)    Orthopedic aftercare       medroxyPROGESTERone 150 MG/ML injection    DEPO-PROVERA    1 mL    Inject 1 mL (150 mg) into the muscle every 3 months    Encounter for surveillance of injectable contraceptive       nortriptyline 50 MG capsule    PAMELOR     Take by mouth 2 times daily    Vertigo, Other fatigue, Weight gain       tiZANidine 2 MG tablet    ZANAFLEX    30 tablet    Take 1 tablet (2 mg) by mouth 3 times daily as needed for muscle spasms    Achilles tendinitis of left lower extremity       traZODone 50 MG tablet    DESYREL

## 2019-08-13 ENCOUNTER — OFFICE VISIT (OUTPATIENT)
Dept: FAMILY MEDICINE | Facility: OTHER | Age: 79
End: 2019-08-13
Payer: MEDICARE

## 2019-08-13 VITALS
DIASTOLIC BLOOD PRESSURE: 60 MMHG | SYSTOLIC BLOOD PRESSURE: 112 MMHG | WEIGHT: 196 LBS | OXYGEN SATURATION: 96 % | BODY MASS INDEX: 34.18 KG/M2 | HEART RATE: 70 BPM | TEMPERATURE: 97.6 F | RESPIRATION RATE: 14 BRPM

## 2019-08-13 DIAGNOSIS — G31.84 MILD NEUROCOGNITIVE DISORDER: Primary | ICD-10-CM

## 2019-08-13 DIAGNOSIS — E83.52 HYPERCALCEMIA: ICD-10-CM

## 2019-08-13 LAB
ANION GAP SERPL CALCULATED.3IONS-SCNC: 5 MMOL/L (ref 3–14)
BUN SERPL-MCNC: 21 MG/DL (ref 7–30)
CA-I SERPL ISE-MCNC: 5.3 MG/DL (ref 4.4–5.2)
CALCIUM SERPL-MCNC: 10.4 MG/DL (ref 8.5–10.1)
CHLORIDE SERPL-SCNC: 102 MMOL/L (ref 94–109)
CO2 SERPL-SCNC: 32 MMOL/L (ref 20–32)
CREAT SERPL-MCNC: 1.21 MG/DL (ref 0.52–1.04)
GFR SERPL CREATININE-BSD FRML MDRD: 42 ML/MIN/{1.73_M2}
GLUCOSE SERPL-MCNC: 135 MG/DL (ref 70–99)
POTASSIUM SERPL-SCNC: 3.8 MMOL/L (ref 3.4–5.3)
PTH-INTACT SERPL-MCNC: 80 PG/ML (ref 18–80)
SODIUM SERPL-SCNC: 139 MMOL/L (ref 133–144)

## 2019-08-13 PROCEDURE — 99214 OFFICE O/P EST MOD 30 MIN: CPT | Performed by: FAMILY MEDICINE

## 2019-08-13 PROCEDURE — 36415 COLL VENOUS BLD VENIPUNCTURE: CPT | Performed by: FAMILY MEDICINE

## 2019-08-13 PROCEDURE — 80048 BASIC METABOLIC PNL TOTAL CA: CPT | Performed by: FAMILY MEDICINE

## 2019-08-13 PROCEDURE — 83970 ASSAY OF PARATHORMONE: CPT | Performed by: FAMILY MEDICINE

## 2019-08-13 PROCEDURE — 82330 ASSAY OF CALCIUM: CPT | Performed by: FAMILY MEDICINE

## 2019-08-13 RX ORDER — DONEPEZIL HYDROCHLORIDE 5 MG/1
5 TABLET, FILM COATED ORAL AT BEDTIME
Qty: 30 TABLET | Refills: 0 | Status: SHIPPED | OUTPATIENT
Start: 2019-08-13 | End: 2019-12-27

## 2019-08-13 RX ORDER — DONEPEZIL HYDROCHLORIDE 10 MG/1
10 TABLET, FILM COATED ORAL AT BEDTIME
Qty: 60 TABLET | Refills: 0 | Status: SHIPPED | OUTPATIENT
Start: 2019-09-13 | End: 2019-12-27

## 2019-08-14 ENCOUNTER — TELEPHONE (OUTPATIENT)
Dept: FAMILY MEDICINE | Facility: OTHER | Age: 79
End: 2019-08-14

## 2019-08-14 NOTE — TELEPHONE ENCOUNTER
Reason for Call:  Medication or medication refill:    Do you use a [facility]?  Name of the pharmacy and phone number for the current request:  [facility]    Name of the medication requested: oxybutynin    Other request: patient states she has been doing her exercises and doing well and wondering if she still needs to take the oxybutynin for her over active bladder? Also on a paper she has it says this list has two medications that are the same as other medications prescribed to you and to review with her provider, but she doesn't know which two they are?     Can we leave a detailed message on this number? YES    Phone number patient can be reached at: Home number on file [phone] (home)    Best Time: any    Call taken on 8/14/2019 at 10:45 AM by Stella Wing

## 2019-08-14 NOTE — TELEPHONE ENCOUNTER
Spoke with pt and clarified message about meds and speaking with her provider. It was in regards to her AVS and the message at the bottom stating there are similar meds listed and to check with your provider and to read directions carefully. This is popping up due to the 2 different donepezils listed on her list. I did clarify the directions with her per the AVS and LOV notes.     Dr Jerome please review and advise on if she can stop her oxybutynin or not. Will then call pt.    Jessica Hughes CMA (AAMA)

## 2019-08-16 NOTE — TELEPHONE ENCOUNTER
OK to stop oxybutynin.  Also had result note about her calcium levels, I copied it below.    Calcium still mildly elevated, but coming down.  Stay off your calcium supplements and let's recheck in 2 months at next appointment.

## 2019-08-26 ENCOUNTER — HOSPITAL ENCOUNTER (OUTPATIENT)
Dept: MRI IMAGING | Facility: CLINIC | Age: 79
Discharge: HOME | End: 2019-08-26
Attending: FAMILY MEDICINE | Admitting: FAMILY MEDICINE
Payer: MEDICARE

## 2019-08-26 DIAGNOSIS — G31.84 MILD NEUROCOGNITIVE DISORDER: ICD-10-CM

## 2019-08-26 PROCEDURE — 70553 MRI BRAIN STEM W/O & W/DYE: CPT

## 2019-08-26 PROCEDURE — A9585 GADOBUTROL INJECTION: HCPCS | Performed by: FAMILY MEDICINE

## 2019-08-26 PROCEDURE — 25500064 ZZH RX 255 OP 636: Performed by: FAMILY MEDICINE

## 2019-08-26 RX ORDER — GADOBUTROL 604.72 MG/ML
10 INJECTION INTRAVENOUS ONCE
Status: COMPLETED | OUTPATIENT
Start: 2019-08-26 | End: 2019-08-26

## 2019-08-26 RX ADMIN — GADOBUTROL 9 ML: 604.72 INJECTION INTRAVENOUS at 10:32

## 2019-08-29 ENCOUNTER — TRANSFERRED RECORDS (OUTPATIENT)
Dept: HEALTH INFORMATION MANAGEMENT | Facility: CLINIC | Age: 79
End: 2019-08-29

## 2019-09-03 DIAGNOSIS — E11.22 TYPE 2 DIABETES MELLITUS WITH STAGE 3 CHRONIC KIDNEY DISEASE, WITHOUT LONG-TERM CURRENT USE OF INSULIN (H): ICD-10-CM

## 2019-09-03 DIAGNOSIS — N18.30 TYPE 2 DIABETES MELLITUS WITH STAGE 3 CHRONIC KIDNEY DISEASE, WITHOUT LONG-TERM CURRENT USE OF INSULIN (H): ICD-10-CM

## 2019-09-03 RX ORDER — GLIPIZIDE 5 MG/1
5 TABLET, FILM COATED, EXTENDED RELEASE ORAL DAILY
Qty: 90 TABLET | Refills: 0 | Status: SHIPPED | OUTPATIENT
Start: 2019-09-03 | End: 2019-12-09

## 2019-09-09 NOTE — PROGRESS NOTES
Subjective     Loni Ybarra is a 78 year old female who presents to clinic today for the following health issues:    HPI   Diarrhea  Onset: 2 weeks    Description:   Consistency of stool: watery, runny and loose  Blood in stool: no   Number of loose stools in past 24 hours: 2    Progression of Symptoms:  improving    Accompanying Signs & Symptoms:  Fever: no   Nausea or vomiting; no   Abdominal pain: YES  Episodes of constipation: no   Weight loss: no     History:   Ill contacts: no   Recent use of antibiotics: no    Recent travels: no          Recent medication-new or changes(Rx or OTC): YES    Precipitating factors:   New medication    Alleviating factors:   unsure    Therapies Tried and outcome:  Citracel/Metamucil; Outcome: unsure if this is what has helped but her symptoms have improved over the past two days    Was doing well until the day after she went to the [facility].  Was seen at [facility] on 19--was diagnosed with encoporesis and was started on Miralax, states she was having 3 stools at night, but did fine during the day.  Has been off Miralax for the last 2 days and feels much better.    Also had mild hypercalcemia, stopped all her calcium supplements.    Also, started Aricept 19, stopped it for 2 nights and then restarted it 2 nights ago.    Patient Active Problem List   Diagnosis     Hypertension, goal below 140/90     Hyperlipidemia, unspecified     Advanced directives, counseling/discussion     CAD (coronary artery disease)     Obesity     CKD (chronic kidney disease) stage 3, GFR 30-59 ml/min (H)     Overactive bladder     CHACHO (obstructive sleep apnea)     Type 2 diabetes mellitus with stage 3 chronic kidney disease, without long-term current use of insulin (H)     Past Surgical History:   Procedure Laterality Date     C  DELIVERY ONLY      , Low Cervical     CARDIAC SURGERY  2011    2 stents placed     CHOLECYSTECTOMY       TONSILLECTOMY       child     TUBAL LIGATION         Social History     Tobacco Use     Smoking status: Never Smoker     Smokeless tobacco: Never Used     Tobacco comment: no smokers in household   Substance Use Topics     Alcohol use: Yes     Comment: kvng x1/<1xper month     Family History   Problem Relation Age of Onset     C.A.D. Mother         3 vessel CABG     Hypertension Mother         ON MEDICATION     Cerebrovascular Disease Mother      Arthritis Mother      Eye Disorder Mother         CATARACT     Gastrointestinal Disease Mother         ULCER     Lipids Mother      Thyroid Disease Mother         ?     Cancer Father         LIVER CANCER     Allergies Brother         SEASONAL     Lipids Brother         CHACHO     Respiratory Brother         ASTHMA     Cancer Brother         prostate     Alzheimer Disease Maternal Aunt      Alzheimer Disease Cousin            Reviewed and updated as needed this visit by Provider  Meds  Problems         Review of Systems   ROS COMP: CONSTITUTIONAL: NEGATIVE for fever, chills, change in weight  RESP: NEGATIVE for significant cough or shortness of breath   CV: NEGATIVE for chest pain, palpitations or peripheral edema      Objective    /72 (BP Location: Left arm, Patient Position: Chair, Cuff Size: Adult Large)   Pulse 57   Temp 98  F (36.7  C) (Temporal)   Resp 14   Wt 88 kg (194 lb)   SpO2 97%   BMI 33.83 kg/m    Body mass index is 33.83 kg/m .  Physical Exam   Gen: no apparent distress  NECK: no adenopathy, no asymmetry, no masses  Chest: clear to auscultation without wheeze, rale or rhonchi  Cor: regular rate and rhythm without murmur  ABDOMEN: soft, nontender, no masses and bowel sounds normal  Ext: warm and dry without edema  Psych: Alert and oriented times 3; coherent speech, normal   rate and volume, able to articulate logical thoughts, able   to abstract reason, no tangential thoughts, no hallucinations   or delusions  Her affect is neutral        Assessment & Plan     1.  Diarrhea, unspecified type  Considerations include medication side effect to Aricept versus encoporesis vs infectious.  Patient had been on Aricept for 2 weeks without a problem before her loose stools started.  Her loose stool started the day after she went to the [facility].  She was then put on MiraLAX which she stopped 2 days ago.  She feels better the last 2 days.  She had stopped her Aricept for 2 days prior to stopping the Aricept and then restarted it.  At this time I suspect encoporesis.  She has stopped the MiraLAX and her symptoms have resolved.  She has been back on Aricept for 2 days.  We discussed staying off MiraLAX.  We discussed that as she takes her Aricept if the diarrhea resumes would then have her stop the Aricept.    2. Hypercalcemia  Her calcium has been mildly elevated.  It was starting to trend down.  She has been off of her calcium supplement for the last month.  Her parathyroid levels were normal.  We will recheck a basic metabolic today.    - Basic metabolic panel    3. Mild neurocognitive disorder  She has started Aricept.  We will follow-up in another month.    Mackenzie Jerome MD  [facility]

## 2019-09-10 ENCOUNTER — OFFICE VISIT (OUTPATIENT)
Dept: FAMILY MEDICINE | Facility: OTHER | Age: 79
End: 2019-09-10
Payer: MEDICARE

## 2019-09-10 VITALS
WEIGHT: 194 LBS | BODY MASS INDEX: 33.83 KG/M2 | TEMPERATURE: 98 F | RESPIRATION RATE: 14 BRPM | HEART RATE: 57 BPM | SYSTOLIC BLOOD PRESSURE: 118 MMHG | OXYGEN SATURATION: 97 % | DIASTOLIC BLOOD PRESSURE: 72 MMHG

## 2019-09-10 DIAGNOSIS — G31.84 MILD NEUROCOGNITIVE DISORDER: ICD-10-CM

## 2019-09-10 DIAGNOSIS — R19.7 DIARRHEA, UNSPECIFIED TYPE: Primary | ICD-10-CM

## 2019-09-10 DIAGNOSIS — E83.52 HYPERCALCEMIA: ICD-10-CM

## 2019-09-10 LAB
ANION GAP SERPL CALCULATED.3IONS-SCNC: 6 MMOL/L (ref 3–14)
BUN SERPL-MCNC: 26 MG/DL (ref 7–30)
CALCIUM SERPL-MCNC: 10 MG/DL (ref 8.5–10.1)
CHLORIDE SERPL-SCNC: 105 MMOL/L (ref 94–109)
CO2 SERPL-SCNC: 28 MMOL/L (ref 20–32)
CREAT SERPL-MCNC: 1.17 MG/DL (ref 0.52–1.04)
GFR SERPL CREATININE-BSD FRML MDRD: 44 ML/MIN/{1.73_M2}
GLUCOSE SERPL-MCNC: 223 MG/DL (ref 70–99)
POTASSIUM SERPL-SCNC: 3.6 MMOL/L (ref 3.4–5.3)
SODIUM SERPL-SCNC: 139 MMOL/L (ref 133–144)

## 2019-09-10 PROCEDURE — 36415 COLL VENOUS BLD VENIPUNCTURE: CPT | Performed by: FAMILY MEDICINE

## 2019-09-10 PROCEDURE — 80048 BASIC METABOLIC PNL TOTAL CA: CPT | Performed by: FAMILY MEDICINE

## 2019-09-10 PROCEDURE — 99214 OFFICE O/P EST MOD 30 MIN: CPT | Performed by: FAMILY MEDICINE

## 2019-12-09 DIAGNOSIS — E11.22 TYPE 2 DIABETES MELLITUS WITH STAGE 3 CHRONIC KIDNEY DISEASE, WITHOUT LONG-TERM CURRENT USE OF INSULIN (H): ICD-10-CM

## 2019-12-09 DIAGNOSIS — N18.30 TYPE 2 DIABETES MELLITUS WITH STAGE 3 CHRONIC KIDNEY DISEASE, WITHOUT LONG-TERM CURRENT USE OF INSULIN (H): ICD-10-CM

## 2019-12-10 RX ORDER — GLIPIZIDE 5 MG/1
5 TABLET, FILM COATED, EXTENDED RELEASE ORAL DAILY
Qty: 30 TABLET | Refills: 0 | Status: SHIPPED | OUTPATIENT
Start: 2019-12-10 | End: 2019-12-27

## 2019-12-10 NOTE — TELEPHONE ENCOUNTER
Pending Prescriptions:                       Disp   Refills    glipiZIDE (GLUCOTROL XL) 5 MG 24 hr tablet*90 tab*0        Sig: Take 1 tablet (5 mg) by mouth daily    Routing refill request to provider for review/approval because:  Sulfonylurea Agents Failed   glipiZIDE (GLUCOTROL XL) 5 MG 24 hr tablet [Pharmacy Med Name: GLIPIZIDE ER 5 MG TAB ER 24]   Rerun Protocol (12/9/2019 10:59 AM)      Patient has documented A1c within the specified period of time.      If HgbA1C is 8 or greater, it needs to be on file within the past 3 months.  If less than 8, must be on file within the past 6 months.          Recent Labs   Lab Test 06/04/19  0921   A1C 8.3*            Patient has a recent creatinine (normal) within the past 12 mos.          Recent Labs   Lab Test 09/10/19  1128   CR 1.17*

## 2019-12-17 NOTE — PROGRESS NOTES
Subjective     Loni Ybarra is a 79 year old female who presents to clinic today for the following health issues:    History of Present Illness        Diabetes:   She presents for follow up of diabetes.  She is checking home blood glucose one time daily. She checks blood glucose before and after meals.  Blood glucose is never over 200 and never under 70. She is aware of hypoglycemia symptoms including shakiness. She has no concerns regarding her diabetes at this time.  She is not experiencing numbness or burning in feet, excessive thirst, blurry vision, weight changes or redness, sores or blisters on feet. The patient has had a diabetic eye exam in the last 12 months. Eye exam performed on 2020.    Diabetes Management Resources    Hyperlipidemia:  She presents for follow up of hyperlipidemia.  She is taking medication to lower cholesterol. She is not having myalgia or other side effects to statin medications.    Hypertension: She presents for follow up of hypertension.  She does check blood pressure  regularly outside of the clinic. Outpatient blood pressures have not been over 140/90. (unsure) She follows a low salt diet.     She eats 2-3 servings of fruits and vegetables daily.She consumes 0 sweetened beverage(s) daily.  She is taking medications regularly.       Patient Active Problem List   Diagnosis     Hypertension, goal below 140/90     Hyperlipidemia, unspecified     Advanced directives, counseling/discussion     CAD (coronary artery disease)     Obesity     CKD (chronic kidney disease) stage 3, GFR 30-59 ml/min (H)     Overactive bladder     CHACHO (obstructive sleep apnea)     Type 2 diabetes mellitus with stage 3 chronic kidney disease, without long-term current use of insulin (H)     Mild neurocognitive disorder     Hypercalcemia     Past Surgical History:   Procedure Laterality Date     C  DELIVERY ONLY      , Low Cervical     CARDIAC SURGERY  2011    2 stents placed      "CHOLECYSTECTOMY  2008     TONSILLECTOMY      child     TUBAL LIGATION         Social History     Tobacco Use     Smoking status: Never Smoker     Smokeless tobacco: Never Used     Tobacco comment: no smokers in household   Substance Use Topics     Alcohol use: Yes     Comment: kvng x1/<1xper month     Family History   Problem Relation Age of Onset     C.A.D. Mother         3 vessel CABG     Hypertension Mother         ON MEDICATION     Cerebrovascular Disease Mother      Arthritis Mother      Eye Disorder Mother         CATARACT     Gastrointestinal Disease Mother         ULCER     Lipids Mother      Thyroid Disease Mother         ?     Cancer Father         LIVER CANCER     Allergies Brother         SEASONAL     Lipids Brother         CHACHO     Respiratory Brother         ASTHMA     Cancer Brother         prostate     Alzheimer Disease Maternal Aunt      Alzheimer Disease Cousin            Reviewed and updated as needed this visit by Provider  Tobacco  Allergies  Meds  Problems  Med Hx  Surg Hx  Fam Hx         Review of Systems   CONSTITUTIONAL: NEGATIVE for fever, chills, change in weight  RESP: NEGATIVE for significant cough or shortness of breath   CV: NEGATIVE for chest pain, palpitations or peripheral edema      Objective    /64   Pulse 62   Temp 98.5  F (36.9  C) (Temporal)   Resp 16   Ht 1.62 m (5' 3.78\")   Wt 86.9 kg (191 lb 8 oz)   SpO2 100%   BMI 33.10 kg/m    Body mass index is 33.1 kg/m .  Physical Exam   Gen: no apparent distress  NECK: no adenopathy, no asymmetry, no masses  Chest: clear to auscultation without wheeze, rale or rhonchi  Cor: regular rate and rhythm without murmur  ABDOMEN: soft, nontender, no masses and bowel sounds normal  Ext: warm and dry without edema  Psych: Alert and oriented times 3; coherent speech, normal   rate and volume, able to articulate logical thoughts, able   to abstract reason, no tangential thoughts, no hallucinations   or delusions  Her " "affect is neutral  Diabetic foot exam: normal DP and PT pulses, no trophic changes or ulcerative lesions, normal sensory exam and normal monofilament exam     Diagnostic Test Results:  Labs reviewed in Epic  Results for orders placed or performed in visit on 12/27/19 (from the past 24 hour(s))   HEMOGLOBIN A1C   Result Value Ref Range    Hemoglobin A1C 6.6 (H) 0 - 5.6 %           Assessment & Plan     1. Type 2 diabetes mellitus with stage 3 chronic kidney disease, without long-term current use of insulin (H)  Much improved.  She is leaving for 3 months for the winter.  She plans on being more active down self.  Encouraged her to continue her diet and exercise.  We will follow-up in 6 months.    - HEMOGLOBIN A1C  - glipiZIDE (GLUCOTROL XL) 5 MG 24 hr tablet; Take 1 tablet (5 mg) by mouth daily  Dispense: 90 tablet; Refill: 1    2. Mild neurocognitive disorder  She states that she stopped Aricept as she was having 20 reactions.  She was not able to clarify any further.  She does notice continued memory issues but does not want to start a different medication at this time.  She states that when she returns in 6 months if she is noticed more memory decline she may consider this again.    3. Hypertension, goal below 140/90  Well-controlled.  Continue medications without change.       BMI:   Estimated body mass index is 33.1 kg/m  as calculated from the following:    Height as of this encounter: 1.62 m (5' 3.78\").    Weight as of this encounter: 86.9 kg (191 lb 8 oz).   Weight management plan: Discussed healthy diet and exercise guidelines      Return in about 6 months (around 6/27/2020) for diabetes.    Mackenzie Jerome MD  [facility]    Answers for HPI/ROS submitted by the patient on 12/27/2019   Chronic problems general questions HPI Form  If you checked off any problems, how difficult have these problems made it for you to do your work, take care of things at home, or get along with other " people?: Not difficult at all  PHQ9 TOTAL SCORE: 0  CRISTO 7 TOTAL SCORE: 0

## 2019-12-27 ENCOUNTER — OFFICE VISIT (OUTPATIENT)
Dept: FAMILY MEDICINE | Facility: OTHER | Age: 79
End: 2019-12-27
Payer: MEDICARE

## 2019-12-27 VITALS
HEART RATE: 62 BPM | SYSTOLIC BLOOD PRESSURE: 120 MMHG | WEIGHT: 191.5 LBS | DIASTOLIC BLOOD PRESSURE: 64 MMHG | BODY MASS INDEX: 32.69 KG/M2 | RESPIRATION RATE: 16 BRPM | TEMPERATURE: 98.5 F | HEIGHT: 64 IN | OXYGEN SATURATION: 100 %

## 2019-12-27 DIAGNOSIS — N18.30 TYPE 2 DIABETES MELLITUS WITH STAGE 3 CHRONIC KIDNEY DISEASE, WITHOUT LONG-TERM CURRENT USE OF INSULIN (H): Primary | ICD-10-CM

## 2019-12-27 DIAGNOSIS — I10 HYPERTENSION, GOAL BELOW 140/90: ICD-10-CM

## 2019-12-27 DIAGNOSIS — E11.22 TYPE 2 DIABETES MELLITUS WITH STAGE 3 CHRONIC KIDNEY DISEASE, WITHOUT LONG-TERM CURRENT USE OF INSULIN (H): Primary | ICD-10-CM

## 2019-12-27 DIAGNOSIS — G31.84 MILD NEUROCOGNITIVE DISORDER: ICD-10-CM

## 2019-12-27 PROBLEM — E83.52 HYPERCALCEMIA: Status: RESOLVED | Noted: 2019-09-10 | Resolved: 2019-12-27

## 2019-12-27 LAB — HBA1C MFR BLD: 6.6 % (ref 0–5.6)

## 2019-12-27 PROCEDURE — 36415 COLL VENOUS BLD VENIPUNCTURE: CPT | Performed by: FAMILY MEDICINE

## 2019-12-27 PROCEDURE — 99214 OFFICE O/P EST MOD 30 MIN: CPT | Performed by: FAMILY MEDICINE

## 2019-12-27 PROCEDURE — 83036 HEMOGLOBIN GLYCOSYLATED A1C: CPT | Performed by: FAMILY MEDICINE

## 2019-12-27 RX ORDER — GLIPIZIDE 5 MG/1
5 TABLET, FILM COATED, EXTENDED RELEASE ORAL DAILY
Qty: 90 TABLET | Refills: 1 | Status: SHIPPED | OUTPATIENT
Start: 2019-12-27 | End: 2020-01-08

## 2019-12-27 ASSESSMENT — PATIENT HEALTH QUESTIONNAIRE - PHQ9
SUM OF ALL RESPONSES TO PHQ QUESTIONS 1-9: 0
10. IF YOU CHECKED OFF ANY PROBLEMS, HOW DIFFICULT HAVE THESE PROBLEMS MADE IT FOR YOU TO DO YOUR WORK, TAKE CARE OF THINGS AT HOME, OR GET ALONG WITH OTHER PEOPLE: NOT DIFFICULT AT ALL
SUM OF ALL RESPONSES TO PHQ QUESTIONS 1-9: 0

## 2019-12-27 ASSESSMENT — ANXIETY QUESTIONNAIRES
2. NOT BEING ABLE TO STOP OR CONTROL WORRYING: NOT AT ALL
7. FEELING AFRAID AS IF SOMETHING AWFUL MIGHT HAPPEN: NOT AT ALL
3. WORRYING TOO MUCH ABOUT DIFFERENT THINGS: NOT AT ALL
6. BECOMING EASILY ANNOYED OR IRRITABLE: NOT AT ALL
GAD7 TOTAL SCORE: 0
7. FEELING AFRAID AS IF SOMETHING AWFUL MIGHT HAPPEN: NOT AT ALL
GAD7 TOTAL SCORE: 0
5. BEING SO RESTLESS THAT IT IS HARD TO SIT STILL: NOT AT ALL
4. TROUBLE RELAXING: NOT AT ALL
1. FEELING NERVOUS, ANXIOUS, OR ON EDGE: NOT AT ALL
GAD7 TOTAL SCORE: 0

## 2019-12-27 ASSESSMENT — MIFFLIN-ST. JEOR: SCORE: 1320.15

## 2019-12-28 ASSESSMENT — ANXIETY QUESTIONNAIRES: GAD7 TOTAL SCORE: 0

## 2019-12-28 ASSESSMENT — PATIENT HEALTH QUESTIONNAIRE - PHQ9: SUM OF ALL RESPONSES TO PHQ QUESTIONS 1-9: 0

## 2020-01-08 DIAGNOSIS — E11.22 TYPE 2 DIABETES MELLITUS WITH STAGE 3 CHRONIC KIDNEY DISEASE, WITHOUT LONG-TERM CURRENT USE OF INSULIN (H): ICD-10-CM

## 2020-01-08 DIAGNOSIS — N18.30 TYPE 2 DIABETES MELLITUS WITH STAGE 3 CHRONIC KIDNEY DISEASE, WITHOUT LONG-TERM CURRENT USE OF INSULIN (H): ICD-10-CM

## 2020-01-08 RX ORDER — GLIPIZIDE 5 MG/1
TABLET, FILM COATED, EXTENDED RELEASE ORAL
Qty: 90 TABLET | Refills: 1 | Status: SHIPPED | OUTPATIENT
Start: 2020-01-08 | End: 2020-07-21

## 2020-06-22 DIAGNOSIS — I25.10 CORONARY ARTERY DISEASE DUE TO LIPID RICH PLAQUE: ICD-10-CM

## 2020-06-22 DIAGNOSIS — I25.83 CORONARY ARTERY DISEASE DUE TO LIPID RICH PLAQUE: ICD-10-CM

## 2020-06-22 DIAGNOSIS — I10 HYPERTENSION, GOAL BELOW 140/90: ICD-10-CM

## 2020-06-23 RX ORDER — METOPROLOL SUCCINATE 50 MG/1
50 TABLET, EXTENDED RELEASE ORAL DAILY
Qty: 90 TABLET | Refills: 1 | Status: SHIPPED | OUTPATIENT
Start: 2020-06-23 | End: 2020-07-16

## 2020-06-24 RX ORDER — LOSARTAN POTASSIUM 100 MG/1
100 TABLET ORAL DAILY
Qty: 90 TABLET | Refills: 0 | Status: SHIPPED | OUTPATIENT
Start: 2020-06-24 | End: 2020-07-16

## 2020-06-24 RX ORDER — CHLORTHALIDONE 25 MG/1
25 TABLET ORAL DAILY
Qty: 90 TABLET | Refills: 0 | Status: SHIPPED | OUTPATIENT
Start: 2020-06-24 | End: 2020-07-16

## 2020-06-24 RX ORDER — ATORVASTATIN CALCIUM 40 MG/1
40 TABLET, FILM COATED ORAL DAILY
Qty: 90 TABLET | Refills: 0 | Status: SHIPPED | OUTPATIENT
Start: 2020-06-24 | End: 2020-07-16

## 2020-07-15 DIAGNOSIS — I10 HYPERTENSION, GOAL BELOW 140/90: ICD-10-CM

## 2020-07-15 DIAGNOSIS — I25.83 CORONARY ARTERY DISEASE DUE TO LIPID RICH PLAQUE: ICD-10-CM

## 2020-07-15 DIAGNOSIS — I25.10 CORONARY ARTERY DISEASE DUE TO LIPID RICH PLAQUE: ICD-10-CM

## 2020-07-16 RX ORDER — LOSARTAN POTASSIUM 100 MG/1
100 TABLET ORAL DAILY
Qty: 90 TABLET | Refills: 0 | Status: SHIPPED | OUTPATIENT
Start: 2020-07-16 | End: 2020-09-29

## 2020-07-16 RX ORDER — ATORVASTATIN CALCIUM 40 MG/1
40 TABLET, FILM COATED ORAL DAILY
Qty: 90 TABLET | Refills: 0 | Status: SHIPPED | OUTPATIENT
Start: 2020-07-16 | End: 2020-10-19

## 2020-07-16 RX ORDER — METOPROLOL SUCCINATE 50 MG/1
50 TABLET, EXTENDED RELEASE ORAL DAILY
Qty: 90 TABLET | Refills: 1 | Status: SHIPPED | OUTPATIENT
Start: 2020-07-16 | End: 2020-11-19

## 2020-07-16 RX ORDER — CHLORTHALIDONE 25 MG/1
25 TABLET ORAL DAILY
Qty: 90 TABLET | Refills: 0 | Status: SHIPPED | OUTPATIENT
Start: 2020-07-16 | End: 2020-09-29

## 2020-07-17 NOTE — PROGRESS NOTES
"SUBJECTIVE:   Loni Ybarra is a 78 year old female who presents for Preventive Visit.  Are you in the first 12 months of your Medicare coverage?  No    Healthy Habits:     In general, how would you rate your overall health?  Good    Frequency of exercise:  2-3 days/week    Duration of exercise:  15-30 minutes    Do you usually eat at least 4 servings of fruit and vegetables a day, include whole grains    & fiber and avoid regularly eating high fat or \"junk\" foods?  Yes    Taking medications regularly:  Yes    Medication side effects:  None    Ability to successfully perform activities of daily living:  No assistance needed    Home Safety:  Throw rugs in the hallway    Hearing Impairment:  No hearing concerns    In the past 6 months, have you been bothered by leaking of urine? Yes    In general, how would you rate your overall mental or emotional health?  Good      PHQ-2 Total Score: 0    Additional concerns today:  No    Do you feel safe in your environment? Yes    Do you have a Health Care Directive? Yes: but not on file in chart    Fall risk  Fallen 2 or more times in the past year?: No  Any fall with injury in the past year?: No    Cognitive Screening   1) Repeat 3 items (Leader, Season, Table)    2) Clock draw: ABNORMAL   3) 3 item recall: Recalls NO objects   Results: 0 items recalled: PROBABLE COGNITIVE IMPAIRMENT, **INFORM PROVIDER**    Mini-CogTM Copyright JUANA Mireles. Licensed by the author for use in [facility]; reprinted with permission (rodrigue@.[facility]). All rights reserved.      Do you have sleep apnea, excessive snoring or daytime drowsiness?: yes    Reviewed and updated as needed this visit by clinical staff  Tobacco  Allergies  Meds  Problems  Med Hx  Surg Hx  Fam Hx  Soc Hx          Reviewed and updated as needed this visit by Provider  Allergies  Meds  Problems        Social History     Tobacco Use     Smoking status: Never Smoker     Smokeless tobacco: Never Used     Tobacco " Orders placed. To non-clinical, please assist with appt. Thank you! comment: no smokers in household   Substance Use Topics     Alcohol use: Yes     Comment: kvng x1/<1xper month     If you drink alcohol do you typically have >3 drinks per day or >7 drinks per week? No    Alcohol Use 6/4/2019   Prescreen: >3 drinks/day or >7 drinks/week? No   Prescreen: >3 drinks/day or >7 drinks/week? -   No flowsheet data found.      Current providers sharing in care for this patient include:   Patient Care Team:  Mackenzie Jerome MD as PCP - General (Family Practice)  Mackenzie Jerome MD as Assigned PCP    The following health maintenance items are reviewed in Epic and correct as of today:  Health Maintenance   Topic Date Due     ZOSTER IMMUNIZATION (1 of 2) 11/18/1989     MEDICARE ANNUAL WELLNESS VISIT  11/18/2004     TSH W/FREE T4 REFLEX  08/02/2018     A1C  11/25/2018     BMP  05/25/2019     LIPID  05/25/2019     MICROALBUMIN  05/25/2019     DIABETIC FOOT EXAM  05/25/2019     EYE EXAM  05/20/2020     ADVANCED DIRECTIVE PLANNING  06/04/2024     DTAP/TDAP/TD IMMUNIZATION (3 - Td) 07/22/2024     DEXA  Completed     PHQ-2  Completed     INFLUENZA VACCINE  Completed     PNEUMOCOCCAL IMMUNIZATION 65+ LOW/MEDIUM RISK  Completed     IPV IMMUNIZATION  Aged Out     MENINGITIS IMMUNIZATION  Aged Out       Review of Systems   Constitutional: Negative for chills and fever.   HENT: Negative for congestion, ear pain and hearing loss.    Eyes: Negative for pain.   Respiratory: Negative for cough.    Cardiovascular: Negative for chest pain.   Gastrointestinal: Negative for abdominal pain, constipation, diarrhea and hematochezia.   Breasts:  Negative for tenderness, breast mass and discharge.   Genitourinary: Negative for frequency, genital sores, hematuria, pelvic pain, vaginal bleeding and vaginal discharge.   Neurological: Negative for dizziness and headaches.   Psychiatric/Behavioral: The patient is not nervous/anxious.        OBJECTIVE:   /70 (BP Location: Left arm, Patient  "Position: Chair, Cuff Size: Adult Large)   Pulse 52   Temp 97.2  F (36.2  C) (Temporal)   Resp 14   Wt 90.3 kg (199 lb)   SpO2 97%   BMI 34.70 kg/m   Estimated body mass index is 34.7 kg/m  as calculated from the following:    Height as of 5/25/18: 1.613 m (5' 3.5\").    Weight as of this encounter: 90.3 kg (199 lb).  Physical Exam   Constitutional: She is oriented to person, place, and time. She appears well-developed and well-nourished. No distress.   HENT:   Right Ear: Tympanic membrane and external ear normal.   Left Ear: Tympanic membrane and external ear normal.   Nose: Nose normal.   Mouth/Throat: Oropharynx is clear and moist. No oropharyngeal exudate.   Eyes: Pupils are equal, round, and reactive to light. Conjunctivae are normal. Right eye exhibits no discharge. Left eye exhibits no discharge.   Neck: Neck supple. No tracheal deviation present. No thyromegaly present.   Cardiovascular: Normal rate, regular rhythm, S1 normal, S2 normal, normal heart sounds and normal pulses. Exam reveals no S3, no S4 and no friction rub.   No murmur heard.  Pulmonary/Chest: Effort normal and breath sounds normal. No respiratory distress. She has no wheezes. She has no rales.   Abdominal: Soft. Bowel sounds are normal. She exhibits no mass. There is no hepatosplenomegaly. There is no tenderness.   Musculoskeletal: Normal range of motion. She exhibits no edema.   Lymphadenopathy:     She has no cervical adenopathy.   Neurological: She is alert and oriented to person, place, and time. She has normal strength and normal reflexes. She exhibits normal muscle tone.   Skin: Skin is warm and dry. No rash noted.   Psychiatric: She has a normal mood and affect. Judgment and thought content normal.     Diabetic foot exam: normal DP and PT pulses, no trophic changes or ulcerative lesions, normal sensory exam and normal monofilament exam     ASSESSMENT / PLAN:   1. Encounter for routine adult health examination without abnormal " findings    2. Type 2 diabetes mellitus with stage 3 chronic kidney disease, without long-term current use of insulin (H)  Labs drawn, refills given.    - TSH WITH FREE T4 REFLEX  - HEMOGLOBIN A1C  - Lipid panel reflex to direct LDL Fasting  - Albumin Random Urine Quantitative with Creat Ratio  - thin (NO BRAND SPECIFIED) lancets; Use with lanceting device. To accompany: Blood Glucose Monitor Brands: per insurance.  Dispense: 100 each; Refill: 3  - Comprehensive metabolic panel    3. Hypertension, goal below 140/90  Well controlled, labs drawn, refills given.    - TSH WITH FREE T4 REFLEX  - Lipid panel reflex to direct LDL Fasting  - Albumin Random Urine Quantitative with Creat Ratio  - chlorthalidone (HYGROTON) 25 MG tablet; Take 1 tablet (25 mg) by mouth daily  Dispense: 90 tablet; Refill: 3  - metoprolol succinate ER (TOPROL-XL) 50 MG 24 hr tablet; Take 1 tablet (50 mg) by mouth daily  Dispense: 90 tablet; Refill: 3  - Comprehensive metabolic panel  - losartan (COZAAR) 100 MG tablet; Take 1 tablet (100 mg) by mouth daily  Dispense: 90 tablet; Refill: 3    4. Coronary artery disease due to lipid rich plaque  Denies angina.    - Lipid panel reflex to direct LDL Fasting  - atorvastatin (LIPITOR) 40 MG tablet; Take 1 tablet (40 mg) by mouth daily  Dispense: 90 tablet; Refill: 3  - Comprehensive metabolic panel    5. Overactive bladder  Still wears a pad.  Would consider pelvic floor rehab, would prefer to see someone in [place] or [place], she will check if it is available, if not, would then go to [place].    - oxybutynin ER (DITROPAN-XL) 10 MG 24 hr tablet; Take 1 tablet (10 mg) by mouth daily  Dispense: 90 tablet; Refill: 3    6. CKD (chronic kidney disease) stage 3, GFR 30-59 ml/min (H)  Stable, avoid NSAIDs.    7. Memory loss  She couldn't remember any of the 3 words and also had difficulty drawing a clock.  Couldn't remember to put hands on the clock and just put dots, but then couldn't figure out how to tell the  "time.  She herself admits that this was weird as she can read the watch on her wrist, yet even looking at the watch, she still couldn't finish the clock.  Will refer for neuropsychometric testing.  - NEUROPSYCHOLOGY REFERRAL  - Vitamin B12    8. Right knee pain, unspecified chronicity  Has a history of femur fracture, feels it aches behind her right knee since then and would like to try Physical Therapy.  Would like to do this in [place].  If Physical Therapy doesn't help, then consider Orthopedics consult and possible steroid injection.    - PHYSICAL THERAPY REFERRAL; Future     End of Life Planning:  Patient currently has an advanced directive: No.  I have verified the patient's ablity to prepare an advanced directive/make health care decisions.  Literature was provided to assist patient in preparing an advanced directive.    COUNSELING:  Reviewed preventive health counseling, as reflected in patient instructions    Estimated body mass index is 34.7 kg/m  as calculated from the following:    Height as of 5/25/18: 1.613 m (5' 3.5\").    Weight as of this encounter: 90.3 kg (199 lb).    Weight management plan: Discussed healthy diet and exercise guidelines     reports that she has never smoked. She has never used smokeless tobacco.      Appropriate preventive services were discussed with this patient, including applicable screening as appropriate for cardiovascular disease, diabetes, osteopenia/osteoporosis, and glaucoma.  As appropriate for age/gender, discussed screening for colorectal cancer, prostate cancer, breast cancer, and cervical cancer. Checklist reviewing preventive services available has been given to the patient.    Reviewed patients plan of care and provided an AVS. The Basic Care Plan (routine screening as documented in Health Maintenance) for Loni meets the Care Plan requirement. This Care Plan has been established and reviewed with the Patient.    Counseling Resources:  ATP IV Guidelines  Pooled " Cohorts Equation Calculator  Breast Cancer Risk Calculator  FRAX Risk Assessment  ICSI Preventive Guidelines  Dietary Guidelines for Americans, 2010  [employer] MyPlate  ASA Prophylaxis  Lung CA Screening    Mackenzie Jerome MD  [facility]    Identified Health Risks:    Information on urinary incontinence and treatment options given to patient.

## 2020-07-18 DIAGNOSIS — N18.30 TYPE 2 DIABETES MELLITUS WITH STAGE 3 CHRONIC KIDNEY DISEASE, WITHOUT LONG-TERM CURRENT USE OF INSULIN (H): ICD-10-CM

## 2020-07-18 DIAGNOSIS — E11.22 TYPE 2 DIABETES MELLITUS WITH STAGE 3 CHRONIC KIDNEY DISEASE, WITHOUT LONG-TERM CURRENT USE OF INSULIN (H): ICD-10-CM

## 2020-07-20 NOTE — TELEPHONE ENCOUNTER
Reason for Call:  Medication or medication refill:    Do you use a [facility]?  Name of the pharmacy and phone number for the current request:  [facility] - [place], [place] - [address]      Name of the medication requested: glipizide     Other request: Patient is scheduled for a phone visit with TC her next available day on 8/4. Please send partial refill to last until the appointment and call patient with update    Can we leave a detailed message on this number? YES    Phone number patient can be reached at: Home number on file [phone] (home)    Best Time: any    Call taken on 7/20/2020 at 2:00 PM by Demian Jarvis

## 2020-07-20 NOTE — TELEPHONE ENCOUNTER
"Spoke to pharmacist, they did not receive Rx for Oxy. Upon review it was \"waiting for payer response\". Rx may need to be cancelled and reordered.  Simona Escalona [facility]    " Pending Prescriptions:                       Disp   Refills    glipiZIDE (GLUCOTROL XL) 5 MG 24 hr tablet*90 tab*1        Sig: Take 1 tablet (5 mg) by mouth daily Due for           appointment    Patient is due for DM F/U. Please call and schedule.    Annabel Oviedo, MSN, RN

## 2020-07-21 RX ORDER — GLIPIZIDE 5 MG/1
TABLET, FILM COATED, EXTENDED RELEASE ORAL
Qty: 90 TABLET | Refills: 0 | Status: SHIPPED | OUTPATIENT
Start: 2020-07-21 | End: 2020-09-29

## 2020-07-30 NOTE — PROGRESS NOTES
"Loni Ybarra is a 79 year old female who is being evaluated via a billable telephone visit.      The patient has been notified of following:     \"This telephone visit will be conducted via a call between you and your physician/provider. We have found that certain health care needs can be provided without the need for a physical exam.  This service lets us provide the care you need with a short phone conversation.  If a prescription is necessary we can send it directly to your pharmacy.  If lab work is needed we can place an order for that and you can then stop by our lab to have the test done at a later time.    Telephone visits are billed at different rates depending on your insurance coverage. During this emergency period, for some insurers they may be billed the same as an in-person visit.  Please reach out to your insurance provider with any questions.    If during the course of the call the physician/provider feels a telephone visit is not appropriate, you will not be charged for this service.\"    Patient has given verbal consent for Telephone visit?  {YES-NO  Default Yes:4444::\"Yes\"}    What phone number would you like to be contacted at? ***    How would you like to obtain your AVS? {AVS Preference:852983}    Subjective     Loni Ybarra is a 79 year old female who presents via phone visit today for the following health issues:    HPI    {SUPERLIST (Optional):189911}  {PEDS Chronic and Acute Problems (Optional):055271}     {additonal problems for provider to add (Optional):685575}    {HIST REVIEW/ LINKS 2 (Optional):049983}    Reviewed and updated as needed this visit by Provider         Review of Systems   {ROS COMP (Optional):636172}       Objective   Reported vitals:  There were no vitals taken for this visit.   {GENERAL APPEARANCE:50::\"healthy\",\"alert\",\"no distress\"}  PSYCH: Alert and oriented times 3; coherent speech, normal   rate and volume, able to articulate logical thoughts, able   to " "abstract reason, no tangential thoughts, no hallucinations   or delusions  Her affect is { :0420421::\"normal\"}  RESP: No cough, no audible wheezing, able to talk in full sentences  Remainder of exam unable to be completed due to telephone visits    {Diagnostic Test Results (Optional):096894::\"Diagnostic Test Results:\",\"Labs reviewed in Epic\"}        Assessment/Plan:    {Diagnosis, Associated Orders and Comment:247628}    No follow-ups on file.      Phone call duration:  *** minutes    {signature options:390256}      "

## 2020-09-01 ENCOUNTER — VIRTUAL VISIT (OUTPATIENT)
Dept: FAMILY MEDICINE | Facility: OTHER | Age: 80
End: 2020-09-01
Payer: MEDICARE

## 2020-09-01 DIAGNOSIS — Z00.00 ENCOUNTER FOR MEDICARE ANNUAL WELLNESS EXAM: Primary | ICD-10-CM

## 2020-09-01 DIAGNOSIS — E11.22 TYPE 2 DIABETES MELLITUS WITH STAGE 3 CHRONIC KIDNEY DISEASE, WITHOUT LONG-TERM CURRENT USE OF INSULIN (H): ICD-10-CM

## 2020-09-01 DIAGNOSIS — N18.30 TYPE 2 DIABETES MELLITUS WITH STAGE 3 CHRONIC KIDNEY DISEASE, WITHOUT LONG-TERM CURRENT USE OF INSULIN (H): ICD-10-CM

## 2020-09-01 PROCEDURE — G0439 PPPS, SUBSEQ VISIT: HCPCS | Mod: 95 | Performed by: FAMILY MEDICINE

## 2020-09-01 RX ORDER — LANCETS
EACH MISCELLANEOUS
Qty: 100 EACH | Refills: 11 | Status: SHIPPED | OUTPATIENT
Start: 2020-09-01 | End: 2020-11-25

## 2020-09-01 RX ORDER — GLUCOSAMINE HCL/CHONDROITIN SU 500-400 MG
CAPSULE ORAL
Qty: 100 EACH | Refills: 3 | Status: SHIPPED | OUTPATIENT
Start: 2020-09-01 | End: 2020-11-25

## 2020-09-01 NOTE — PROGRESS NOTES
"Loni Ybarra is a 79 year old female who is being evaluated via a billable telephone visit.      The patient has been notified of following:     \"This telephone visit will be conducted via a call between you and your physician/provider. We have found that certain health care needs can be provided without the need for a physical exam.  This service lets us provide the care you need with a short phone conversation.  If a prescription is necessary we can send it directly to your pharmacy.  If lab work is needed we can place an order for that and you can then stop by our lab to have the test done at a later time.    Telephone visits are billed at different rates depending on your insurance coverage. During this emergency period, for some insurers they may be billed the same as an in-person visit.  Please reach out to your insurance provider with any questions.    If during the course of the call the physician/provider feels a telephone visit is not appropriate, you will not be charged for this service.\"    Patient has given verbal consent for Telephone visit?  Yes    What phone number would you like to be contacted at? [phone]    How would you like to obtain your AVS? Wilver Abdi     Loni Ybarra is a 79 year old female who presents via phone visit today for the following health issues:    HPI       Diabetes Follow-up      How often are you checking your blood sugar? Not at all, needs a new meter    What concerns do you have today about your diabetes? None     Do you have any of these symptoms? (Select all that apply)  No numbness or tingling in feet.  No redness, sores or blisters on feet.  No complaints of excessive thirst.  No reports of blurry vision.  No significant changes to weight.    Have you had a diabetic eye exam in the last 12 months? April and May 2020, [facility]            Hyperlipidemia Follow-Up      Are you regularly taking any medication or supplement to lower your " "cholesterol?   Yes- lipitor     Are you having muscle aches or other side effects that you think could be caused by your cholesterol lowering medication?  No    Hypertension Follow-up      Do you check your blood pressure regularly outside of the clinic? No     Are you following a low salt diet? Yes    Are your blood pressures ever more than 140 on the top number (systolic) OR more   than 90 on the bottom number (diastolic), for example 140/90? No    BP Readings from Last 2 Encounters:   12/27/19 120/64   09/10/19 118/72     Hemoglobin A1C (%)   Date Value   12/27/2019 6.6 (H)   06/04/2019 8.3 (H)     LDL Cholesterol Calculated (mg/dL)   Date Value   06/04/2019 73   05/25/2018 70         How many servings of fruits and vegetables do you eat daily?  0-1    On average, how many sweetened beverages do you drink each day (Examples: soda, juice, sweet tea, etc.  Do NOT count diet or artificially sweetened beverages)?  0    How many days per week do you exercise enough to make your heart beat faster? 2    How many minutes a day do you exercise enough to make your heart beat faster? 20 - 29    How many days per week do you miss taking your medication? 0    Annual Wellness Visit    Are you in the first 12 months of your Medicare Part B coverage?  No    Physical Health:    In general, how would you rate your overall physical health? good    Outside of work, how many days during the week do you exercise?2-3 days/week    Outside of work, approximately how many minutes a day do you exercise?15-30 minutes    If you drink alcohol do you typically have >3 drinks per day or >7 drinks per week? No    Do you usually eat at least 4 servings of fruit and vegetables a day, include whole grains & fiber and avoid regularly eating high fat or \"junk\" foods? NO    Do you have any problems taking medications regularly? No    Do you have any side effects from medications? none    Needs assistance for the following daily activities: no " assistance needed    Which of the following safety concerns are present in your home?  none identified     Hearing impairment: Yes, has hearing aides    In the past 6 months, have you been bothered by leaking of urine? no    There were no vitals taken for this visit.  Weight: patient reported  Height: patient reported  BMI: based on patient reported data  Blood Pressure: Unable to obtain due to video visit    Mental Health:    In general, how would you rate your overall mental or emotional health? good  PHQ-2 Score:  0    Do you feel safe in your environment? Yes    Have you ever done Advance Care Planning? (For example, a Health Directive, POLST, or a discussion with a medical provider or your loved ones about your wishes)? No, advance care planning information given to patient to review.  Patient plans to discuss their wishes with loved ones or provider.      Fall risk:  Fallen 2 or more times in the past year?: No  Any fall with injury in the past year?: No    Cognitive Screening: Unable to complete due to virtual visit; need for additional assessment in future face-to-face visit      Current providers sharing in care for this patient include:   Patient Care Team:  Mackenzie Jerome MD as PCP - General (Family Practice)  Mackenzie Jerome MD as Assigned PCP      Review of Systems   CONSTITUTIONAL: NEGATIVE for fever, chills, change in weight  ENT/MOUTH: NEGATIVE for ear, mouth and throat problems  RESP: NEGATIVE for significant cough or SOB  CV: NEGATIVE for chest pain, palpitations or peripheral edema       Objective          Vitals:  No vitals were obtained today due to virtual visit.    Gen: healthy, alert and no distress  PSYCH: Alert and oriented times 3; coherent speech, normal   rate and volume, able to articulate logical thoughts, able   to abstract reason, no tangential thoughts, no hallucinations   or delusions  Her affect is normal  RESP: No cough, no audible wheezing, able to talk in full  "sentences  Remainder of exam unable to be completed due to telephone visits          Assessment/Plan:    Assessment & Plan     Encounter for Medicare annual wellness exam  Plans to get flu shot and Shingrix at local pharmacy in [place], [place]    Type 2 diabetes mellitus with stage 3 chronic kidney disease, without long-term current use of insulin (H)  New meter and supplies sent to pharmacy.  Due for labs.    - blood glucose monitoring (NO BRAND SPECIFIED) meter device kit; Use to test blood sugar 1 times daily or as directed. Preferred blood glucose meter OR supplies to accompany: Blood Glucose Monitor Brands: per insurance.  - blood glucose (NO BRAND SPECIFIED) test strip; Use to test blood sugar 1 times daily or as directed. To accompany: Blood Glucose Monitor Brands: per insurance.  - blood glucose calibration (NO BRAND SPECIFIED) solution; To accompany: Blood Glucose Monitor Brands: per insurance.  - thin (NO BRAND SPECIFIED) lancets; Use with lanceting device. To accompany: Blood Glucose Monitor Brands: per insurance.  - alcohol swab prep pads; Use to swab area of injection/mandeep as directed.  - Lipid panel reflex to direct LDL Fasting; Future  - **A1C FUTURE anytime; Future  - **Comprehensive metabolic panel FUTURE anytime; Future  - Albumin Random Urine Quantitative with Creat Ratio; Future     BMI:   Estimated body mass index is 33.1 kg/m  as calculated from the following:    Height as of 12/27/19: 1.62 m (5' 3.78\").    Weight as of 12/27/19: 86.9 kg (191 lb 8 oz).   Weight management plan: Discussed healthy diet and exercise guidelines      Return in about 53 weeks (around 9/7/2021) for Annual Wellness Visit and 6 months for diabetes.    Mackenzie Jerome MD  [facility]    Phone call duration:  20 minutes              "

## 2020-09-08 DIAGNOSIS — N18.30 TYPE 2 DIABETES MELLITUS WITH STAGE 3 CHRONIC KIDNEY DISEASE, WITHOUT LONG-TERM CURRENT USE OF INSULIN (H): ICD-10-CM

## 2020-09-08 DIAGNOSIS — E11.22 TYPE 2 DIABETES MELLITUS WITH STAGE 3 CHRONIC KIDNEY DISEASE, WITHOUT LONG-TERM CURRENT USE OF INSULIN (H): ICD-10-CM

## 2020-09-08 LAB
ALBUMIN SERPL-MCNC: 3.5 G/DL (ref 3.4–5)
ALP SERPL-CCNC: 109 U/L (ref 40–150)
ALT SERPL W P-5'-P-CCNC: 22 U/L (ref 0–50)
ANION GAP SERPL CALCULATED.3IONS-SCNC: 8 MMOL/L (ref 3–14)
AST SERPL W P-5'-P-CCNC: 17 U/L (ref 0–45)
BILIRUB SERPL-MCNC: 0.3 MG/DL (ref 0.2–1.3)
BUN SERPL-MCNC: 37 MG/DL (ref 7–30)
CALCIUM SERPL-MCNC: 11.2 MG/DL (ref 8.5–10.1)
CHLORIDE SERPL-SCNC: 107 MMOL/L (ref 94–109)
CHOLEST SERPL-MCNC: 165 MG/DL
CO2 SERPL-SCNC: 26 MMOL/L (ref 20–32)
CREAT SERPL-MCNC: 1.57 MG/DL (ref 0.52–1.04)
CREAT UR-MCNC: 98 MG/DL
GFR SERPL CREATININE-BSD FRML MDRD: 31 ML/MIN/{1.73_M2}
GLUCOSE SERPL-MCNC: 116 MG/DL (ref 70–99)
HBA1C MFR BLD: 6.4 % (ref 0–5.6)
HDLC SERPL-MCNC: 62 MG/DL
LDLC SERPL CALC-MCNC: 70 MG/DL
MICROALBUMIN UR-MCNC: 47 MG/L
MICROALBUMIN/CREAT UR: 48.01 MG/G CR (ref 0–25)
NONHDLC SERPL-MCNC: 103 MG/DL
POTASSIUM SERPL-SCNC: 3.7 MMOL/L (ref 3.4–5.3)
PROT SERPL-MCNC: 7.2 G/DL (ref 6.8–8.8)
SODIUM SERPL-SCNC: 141 MMOL/L (ref 133–144)
TRIGL SERPL-MCNC: 164 MG/DL

## 2020-09-08 PROCEDURE — 36415 COLL VENOUS BLD VENIPUNCTURE: CPT | Performed by: FAMILY MEDICINE

## 2020-09-08 PROCEDURE — 83036 HEMOGLOBIN GLYCOSYLATED A1C: CPT | Mod: QW | Performed by: FAMILY MEDICINE

## 2020-09-08 PROCEDURE — 82043 UR ALBUMIN QUANTITATIVE: CPT | Performed by: FAMILY MEDICINE

## 2020-09-08 PROCEDURE — 80061 LIPID PANEL: CPT | Performed by: FAMILY MEDICINE

## 2020-09-08 PROCEDURE — 80053 COMPREHEN METABOLIC PANEL: CPT | Performed by: FAMILY MEDICINE

## 2020-09-08 NOTE — RESULT ENCOUNTER NOTE
Please call patient with the following message    TC patient. Kidneys with worsening since last labs 1 year ago. This can be due to her diabetes or just age related decline in kidney function.I recommend recheck in 2 weeks to assure not progressing further. Rest of labs stable/normal. A1C decreased from 6.6 to 6.4.     Carlin Zarco PA-C

## 2020-09-22 ENCOUNTER — TELEPHONE (OUTPATIENT)
Dept: FAMILY MEDICINE | Facility: OTHER | Age: 80
End: 2020-09-22

## 2020-09-22 DIAGNOSIS — N18.30 CKD (CHRONIC KIDNEY DISEASE) STAGE 3, GFR 30-59 ML/MIN (H): Primary | ICD-10-CM

## 2020-09-22 LAB
ALBUMIN SERPL-MCNC: 3.1 G/DL (ref 3.4–5)
ALBUMIN SERPL-MCNC: 3.3 G/DL (ref 3.4–5)
ALP SERPL-CCNC: 106 U/L (ref 40–150)
ALT SERPL W P-5'-P-CCNC: 21 U/L (ref 0–50)
ANION GAP SERPL CALCULATED.3IONS-SCNC: 4 MMOL/L (ref 3–14)
AST SERPL W P-5'-P-CCNC: 17 U/L (ref 0–45)
BILIRUB DIRECT SERPL-MCNC: 0.1 MG/DL (ref 0–0.2)
BILIRUB SERPL-MCNC: 0.3 MG/DL (ref 0.2–1.3)
BUN SERPL-MCNC: 31 MG/DL (ref 7–30)
CALCIUM SERPL-MCNC: 10.4 MG/DL (ref 8.5–10.1)
CHLORIDE SERPL-SCNC: 108 MMOL/L (ref 94–109)
CO2 SERPL-SCNC: 28 MMOL/L (ref 20–32)
CREAT SERPL-MCNC: 1.55 MG/DL (ref 0.52–1.04)
GFR SERPL CREATININE-BSD FRML MDRD: 31 ML/MIN/{1.73_M2}
GLUCOSE SERPL-MCNC: 120 MG/DL (ref 70–99)
PHOSPHATE SERPL-MCNC: 3.1 MG/DL (ref 2.5–4.5)
POTASSIUM SERPL-SCNC: 4.1 MMOL/L (ref 3.4–5.3)
PROT SERPL-MCNC: 7 G/DL (ref 6.8–8.8)
SODIUM SERPL-SCNC: 140 MMOL/L (ref 133–144)

## 2020-09-22 PROCEDURE — 80069 RENAL FUNCTION PANEL: CPT | Performed by: PHYSICIAN ASSISTANT

## 2020-09-22 PROCEDURE — 36415 COLL VENOUS BLD VENIPUNCTURE: CPT | Performed by: PHYSICIAN ASSISTANT

## 2020-09-22 PROCEDURE — 80076 HEPATIC FUNCTION PANEL: CPT | Mod: 91 | Performed by: PHYSICIAN ASSISTANT

## 2020-09-22 NOTE — RESULT ENCOUNTER NOTE
Please call patient with the following message    Kidney function still worsened. Recommend visit with PCP (TC) to discuss.     Carlin Zarco PA-C

## 2020-09-22 NOTE — TELEPHONE ENCOUNTER
----- Message from Danielle Zarco PA-C sent at 9/22/2020 10:04 AM CDT -----  Please call patient with the following message    Kidney function still worsened. Recommend visit with PCP (TC) to discuss.     Carlin Zarco PA-C

## 2020-09-22 NOTE — TELEPHONE ENCOUNTER
Pt had called back and she is scheduled with TC on9/29/20 for a [facility] telephone visit.

## 2020-09-25 NOTE — PROGRESS NOTES
"Loni Ybarra is a 79 year old female who is being evaluated via a billable telephone visit.      The patient has been notified of following:     \"This telephone visit will be conducted via a call between you and your physician/provider. We have found that certain health care needs can be provided without the need for a physical exam.  This service lets us provide the care you need with a short phone conversation.  If a prescription is necessary we can send it directly to your pharmacy.  If lab work is needed we can place an order for that and you can then stop by our lab to have the test done at a later time.    Telephone visits are billed at different rates depending on your insurance coverage. During this emergency period, for some insurers they may be billed the same as an in-person visit.  Please reach out to your insurance provider with any questions.    If during the course of the call the physician/provider feels a telephone visit is not appropriate, you will not be charged for this service.\"    Patient has given verbal consent for Telephone visit?  Yes    What phone number would you like to be contacted at? [phone]    How would you like to obtain your AVS? Wilver    Subjective     Loni Ybarra is a 79 year old female who presents via phone visit today for the following health issues:    HPI    LAB RESULTS     Patient had routine labs and her creatinine was 1.57 with a GFR of 31.  This is different from her usual baseline between 1.17-1.21 with a GFR in the 40s.  Patient overall feels well.  She has not changed any of her medications.  She denies chest pain, shortness of breath, increased urination.  Also noted that her calcium is elevated.  This did improve upon recheck.    Review of Systems   CONSTITUTIONAL: NEGATIVE for fever, chills, change in weight  RESP: NEGATIVE for significant cough or shortness of breath  CV: NEGATIVE for chest pain, palpitations or peripheral edema       Objective    "       Vitals:  No vitals were obtained today due to virtual visit.    General: alert and no distress  PSYCH: Alert and oriented times 3; coherent speech, normal   rate and volume, able to articulate logical thoughts, able   to abstract reason, no tangential thoughts, no hallucinations   or delusions  Her affect is normal  RESP: No cough, no audible wheezing, able to talk in full sentences  Remainder of exam unable to be completed due to telephone visits    Labs reviewed in epic        Assessment/Plan:    Assessment & Plan     CKD (chronic kidney disease) stage 3, GFR 30-59 ml/min (H)  Patient currently is being treated for high blood pressure with chlorthalidone, losartan and metoprolol.  At this time will stop her chlorthalidone.  We will have her return for blood pressure check and repeat labs in 3 weeks.    - **Basic metabolic panel FUTURE anytime; Future    Hypertension, goal below 140/90  Patient has not had her blood pressure checked recently.  We will stop the chlorthalidone and when she returns from labs in 3 weeks we will have her blood pressure drawn as well.    - **Basic metabolic panel FUTURE anytime; Future    Type 2 diabetes mellitus with stage 3 chronic kidney disease, without long-term current use of insulin (H)  Last A1c was 6.4% which is improved.  At her age goal A1c is less than 8%.  Therefore we will stop her glipizide.    Hypercalcemia  Patient's albumin is a little low.  Her calcium did improve after the last 2 weeks.  When she returns in 3 weeks will recheck calcium, ionized calcium and a parathyroid.  - Parathyroid Hormone Intact; Future  - Calcium ionized; Future       Return in about 3 weeks (around 10/20/2020) for BP Recheck, Lab Work.    Mackenzie Jerome MD  [facility]    Phone call duration:  9 minutes

## 2020-09-29 ENCOUNTER — VIRTUAL VISIT (OUTPATIENT)
Dept: FAMILY MEDICINE | Facility: OTHER | Age: 80
End: 2020-09-29
Payer: MEDICARE

## 2020-09-29 DIAGNOSIS — E11.22 TYPE 2 DIABETES MELLITUS WITH STAGE 3B CHRONIC KIDNEY DISEASE, WITHOUT LONG-TERM CURRENT USE OF INSULIN (H): ICD-10-CM

## 2020-09-29 DIAGNOSIS — N18.32 STAGE 3B CHRONIC KIDNEY DISEASE (H): Primary | ICD-10-CM

## 2020-09-29 DIAGNOSIS — E83.52 HYPERCALCEMIA: ICD-10-CM

## 2020-09-29 DIAGNOSIS — I10 HYPERTENSION, GOAL BELOW 140/90: ICD-10-CM

## 2020-09-29 DIAGNOSIS — N18.32 TYPE 2 DIABETES MELLITUS WITH STAGE 3B CHRONIC KIDNEY DISEASE, WITHOUT LONG-TERM CURRENT USE OF INSULIN (H): ICD-10-CM

## 2020-09-29 PROCEDURE — 99214 OFFICE O/P EST MOD 30 MIN: CPT | Mod: 95 | Performed by: FAMILY MEDICINE

## 2020-09-29 RX ORDER — LOSARTAN POTASSIUM 100 MG/1
100 TABLET ORAL DAILY
Qty: 90 TABLET | Refills: 3 | Status: ON HOLD | OUTPATIENT
Start: 2020-09-29 | End: 2020-12-29

## 2020-09-29 NOTE — TELEPHONE ENCOUNTER
Pending Prescriptions:                       Disp   Refills    losartan (COZAAR) 100 MG tablet [Pharmacy*90 tab*3            Sig: Take 1 tablet (100 mg) by mouth daily  Routing refill request to provider for review/approval because:  Labs out of range:  creatinine      atorvastatin (LIPITOR) 40 MG tablet [Phar*90 tab*3            Sig: Take 1 tablet (40 mg) by mouth daily  Routing refill request to provider for review/approval because:  Labs not current:  LDL due 6/2020  Provider advise if ok to schedule now or postpone lab d/t pandemic      chlorthalidone (HYGROTON) 25 MG tablet [P*90 tab*3            Sig: Take 1 tablet (25 mg) by mouth daily  Routing refill request to provider for review/approval because:  Labs out of range:  creatinine      metoprolol succinate ER (TOPROL-XL) 50 MG*90 tab*3            Sig: Take 1 tablet (50 mg) by mouth daily  Prescription approved per [facility] Refill Protocol.    Jeanie Ramos, BSN, RN, PHN     none

## 2020-09-29 NOTE — TELEPHONE ENCOUNTER
Pending Prescriptions:                       Disp   Refills    losartan (COZAAR) 100 MG tablet [Pharmacy *90 tab*0        Sig: Take 1 tablet (100 mg) by mouth daily        Routing refill request to provider for review/approval because:  Labs out of range:  Angiotensin-II Receptors Failed     Rerun Protocol  (9/29/2020 5:12 PM)       Normal serum creatinine on file in past 12 months         Recent Labs   Lab Test 09/22/20  0706   CR 1.55*          Hannha Mancia RN

## 2020-10-11 DIAGNOSIS — N18.30 CKD (CHRONIC KIDNEY DISEASE) STAGE 3, GFR 30-59 ML/MIN (H): ICD-10-CM

## 2020-10-11 DIAGNOSIS — E83.52 HYPERCALCEMIA: ICD-10-CM

## 2020-10-11 DIAGNOSIS — I10 HYPERTENSION, GOAL BELOW 140/90: ICD-10-CM

## 2020-10-11 LAB
ANION GAP SERPL CALCULATED.3IONS-SCNC: 6 MMOL/L (ref 3–14)
BUN SERPL-MCNC: 31 MG/DL (ref 7–30)
CA-I SERPL ISE-MCNC: 5.2 MG/DL (ref 4.4–5.2)
CALCIUM SERPL-MCNC: 10.2 MG/DL (ref 8.5–10.1)
CHLORIDE SERPL-SCNC: 111 MMOL/L (ref 94–109)
CO2 SERPL-SCNC: 27 MMOL/L (ref 20–32)
CREAT SERPL-MCNC: 1.46 MG/DL (ref 0.52–1.04)
GFR SERPL CREATININE-BSD FRML MDRD: 33 ML/MIN/{1.73_M2}
GLUCOSE SERPL-MCNC: 150 MG/DL (ref 70–99)
POTASSIUM SERPL-SCNC: 4 MMOL/L (ref 3.4–5.3)
PTH-INTACT SERPL-MCNC: 168 PG/ML (ref 18–80)
SODIUM SERPL-SCNC: 144 MMOL/L (ref 133–144)

## 2020-10-11 PROCEDURE — 82330 ASSAY OF CALCIUM: CPT | Performed by: FAMILY MEDICINE

## 2020-10-11 PROCEDURE — 80048 BASIC METABOLIC PNL TOTAL CA: CPT | Performed by: FAMILY MEDICINE

## 2020-10-11 PROCEDURE — 83970 ASSAY OF PARATHORMONE: CPT | Performed by: FAMILY MEDICINE

## 2020-10-12 NOTE — RESULT ENCOUNTER NOTE
Glendy, your results are appearing to improve from previous, though Dr. Mann added parathyroid which often gets a little off with worsening kidney functions which these have not recovered to baseline yet. Likely she will want a repeat, but will wait for her decide exactly when. She should let you know when she returns to clinic.  Please let me know if you have any questions.  Pricila Massey MD

## 2020-10-19 ENCOUNTER — TELEPHONE (OUTPATIENT)
Dept: FAMILY MEDICINE | Facility: OTHER | Age: 80
End: 2020-10-19

## 2020-10-19 DIAGNOSIS — I25.10 CORONARY ARTERY DISEASE DUE TO LIPID RICH PLAQUE: ICD-10-CM

## 2020-10-19 DIAGNOSIS — I25.83 CORONARY ARTERY DISEASE DUE TO LIPID RICH PLAQUE: ICD-10-CM

## 2020-10-19 RX ORDER — ATORVASTATIN CALCIUM 40 MG/1
40 TABLET, FILM COATED ORAL DAILY
Qty: 90 TABLET | Refills: 3 | Status: SHIPPED | OUTPATIENT
Start: 2020-10-19 | End: 2020-11-06 | Stop reason: SINTOL

## 2020-10-19 NOTE — TELEPHONE ENCOUNTER
Double O-Z Plasty Text: The defect edges were debeveled with a #15 scalpel blade.  Given the location of the defect, shape of the defect and the proximity to free margins a Double O-Z plasty (double transposition flap) was deemed most appropriate.  Using a sterile surgical marker, the appropriate transposition flaps were drawn incorporating the defect and placing the expected incisions within the relaxed skin tension lines where possible. The area thus outlined was incised deep to adipose tissue with a #15 scalpel blade.  The skin margins were undermined to an appropriate distance in all directions utilizing iris scissors.  Hemostasis was achieved with electrocautery.  The flaps were then transposed into place, one clockwise and the other counterclockwise, and anchored with interrupted buried subcutaneous sutures. Reason for Call:  Medication or medication refill:    Do you use a [facility]?  Name of the pharmacy and phone number for the current request:  [facility]    Name of the medication requested: atorvastatin    Other request: patient is wondering if this was one of the medications she was told not to take?     Can we leave a detailed message on this number? YES    Phone number patient can be reached at: Home number on file [phone] (home)    Best Time: any    Call taken on 10/19/2020 at 10:11 AM by Stella Wing       Additional Anesthesia Volume In Cc: 0 Complex Repair And Split-Thickness Skin Graft Text: The defect edges were debeveled with a #15 scalpel blade.  The primary defect was closed partially with a complex linear closure.  Given the location of the defect, shape of the defect and the proximity to free margins a split thickness skin graft was deemed most appropriate to repair the remaining defect.  The graft was trimmed to fit the size of the remaining defect.  The graft was then placed in the primary defect, oriented appropriately, and sutured into place. Show Accession Variable: Yes Cheek Interpolation Flap Text: A decision was made to reconstruct the defect utilizing an interpolation axial flap and a staged reconstruction.  A telfa template was made of the defect.  This telfa template was then used to outline the Cheek Interpolation flap.  The donor area for the pedicle flap was then injected with anesthesia.  The flap was excised through the skin and subcutaneous tissue down to the layer of the underlying musculature.  The interpolation flap was carefully excised within this deep plane to maintain its blood supply.  The edges of the donor site were undermined.   The donor site was closed in a primary fashion.  The pedicle was then rotated into position and sutured.  Once the tube was sutured into place, adequate blood supply was confirmed with blanching and refill.  The pedicle was then wrapped with xeroform gauze and dressed appropriately with a telfa and gauze bandage to ensure continued blood supply and protect the attached pedicle. Double M-Plasty Intermediate Repair Preamble Text (Leave Blank If You Do Not Want): Undermining was performed with blunt dissection. Wound Care: Vaseline Complex Repair And O-L Flap Text: The defect edges were debeveled with a #15 scalpel blade.  The primary defect was closed partially with a complex linear closure.  Given the location of the remaining defect, shape of the defect and the proximity to free margins an O-L flap was deemed most appropriate for complete closure of the defect.  Using a sterile surgical marker, an appropriate flap was drawn incorporating the defect and placing the expected incisions within the relaxed skin tension lines where possible.    The area thus outlined was incised deep to adipose tissue with a #15 scalpel blade.  The skin margins were undermined to an appropriate distance in all directions utilizing iris scissors. Deep Sutures: 3-0 Vicryl Burow's Advancement Flap Text: The defect edges were debeveled with a #15 scalpel blade.  Given the location of the defect and the proximity to free margins a Burow's advancement flap was deemed most appropriate.  Using a sterile surgical marker, the appropriate advancement flap was drawn incorporating the defect and placing the expected incisions within the relaxed skin tension lines where possible.    The area thus outlined was incised deep to adipose tissue with a #15 scalpel blade.  The skin margins were undermined to an appropriate distance in all directions utilizing iris scissors. Post-Care Instructions: I reviewed with the patient in detail post-care instructions. Patient is not to engage in any heavy lifting, exercise, or swimming for the next 14 days. Should the patient develop any fevers, chills, bleeding, severe pain patient will contact the office immediately. Composite Graft Text: The defect edges were debeveled with a #15 scalpel blade.  Given the location of the defect, shape of the defect, the proximity to free margins and the fact the defect was full thickness a composite graft was deemed most appropriate.  The defect was outline and then transferred to the donor site.  A full thickness graft was then excised from the donor site. The graft was then placed in the primary defect, oriented appropriately and then sutured into place.  The secondary defect was then repaired using a primary closure. H Plasty Text: Given the location of the defect, shape of the defect and the proximity to free margins a H-plasty was deemed most appropriate for repair.  Using a sterile surgical marker, the appropriate advancement arms of the H-plasty were drawn incorporating the defect and placing the expected incisions within the relaxed skin tension lines where possible. The area thus outlined was incised deep to adipose tissue with a #15 scalpel blade. The skin margins were undermined to an appropriate distance in all directions utilizing iris scissors.  The opposing advancement arms were then advanced into place in opposite direction and anchored with interrupted buried subcutaneous sutures. Intermediate / Complex Repair - Final Wound Length In Cm: 4.2 Complex Repair And Double Advancement Flap Text: The defect edges were debeveled with a #15 scalpel blade.  The primary defect was closed partially with a complex linear closure.  Given the location of the remaining defect, shape of the defect and the proximity to free margins a double advancement flap was deemed most appropriate for complete closure of the defect.  Using a sterile surgical marker, an appropriate advancement flap was drawn incorporating the defect and placing the expected incisions within the relaxed skin tension lines where possible.    The area thus outlined was incised deep to adipose tissue with a #15 scalpel blade.  The skin margins were undermined to an appropriate distance in all directions utilizing iris scissors. Positioning (Leave Blank If You Do Not Want): The patient was placed in a comfortable position exposing the surgical site. Paramedian Forehead Flap Text: A decision was made to reconstruct the defect utilizing an interpolation axial flap and a staged reconstruction.  A telfa template was made of the defect.  This telfa template was then used to outline the paramedian forehead pedicle flap.  The donor area for the pedicle flap was then injected with anesthesia.  The flap was excised through the skin and subcutaneous tissue down to the layer of the underlying musculature.  The pedicle flap was carefully excised within this deep plane to maintain its blood supply.  The edges of the donor site were undermined.   The donor site was closed in a primary fashion.  The pedicle was then rotated into position and sutured.  Once the tube was sutured into place, adequate blood supply was confirmed with blanching and refill.  The pedicle was then wrapped with xeroform gauze and dressed appropriately with a telfa and gauze bandage to ensure continued blood supply and protect the attached pedicle. Size Of Lesion In Cm: 1.4 Complex Repair And Epidermal Autograft Text: The defect edges were debeveled with a #15 scalpel blade.  The primary defect was closed partially with a complex linear closure.  Given the location of the defect, shape of the defect and the proximity to free margins an epidermal autograft was deemed most appropriate to repair the remaining defect.  The graft was trimmed to fit the size of the remaining defect.  The graft was then placed in the primary defect, oriented appropriately, and sutured into place. Island Pedicle Flap With Canthal Suspension Text: The defect edges were debeveled with a #15 scalpel blade.  Given the location of the defect, shape of the defect and the proximity to free margins an island pedicle advancement flap was deemed most appropriate.  Using a sterile surgical marker, an appropriate advancement flap was drawn incorporating the defect, outlining the appropriate donor tissue and placing the expected incisions within the relaxed skin tension lines where possible. The area thus outlined was incised deep to adipose tissue with a #15 scalpel blade.  The skin margins were undermined to an appropriate distance in all directions around the primary defect and laterally outward around the island pedicle utilizing iris scissors.  There was minimal undermining beneath the pedicle flap. A suspension suture was placed in the canthal tendon to prevent tension and prevent ectropion. W Plasty Text: The lesion was extirpated to the level of the fat with a #15 scalpel blade.  Given the location of the defect, shape of the defect and the proximity to free margins a W-plasty was deemed most appropriate for repair.  Using a sterile surgical marker, the appropriate transposition arms of the W-plasty were drawn incorporating the defect and placing the expected incisions within the relaxed skin tension lines where possible.    The area thus outlined was incised deep to adipose tissue with a #15 scalpel blade.  The skin margins were undermined to an appropriate distance in all directions utilizing iris scissors.  The opposing transposition arms were then transposed into place in opposite direction and anchored with interrupted buried subcutaneous sutures. Muscle Hinge Flap Text: The defect edges were debeveled with a #15 scalpel blade.  Given the size, depth and location of the defect and the proximity to free margins a muscle hinge flap was deemed most appropriate.  Using a sterile surgical marker, an appropriate hinge flap was drawn incorporating the defect. The area thus outlined was incised with a #15 scalpel blade.  The skin margins were undermined to an appropriate distance in all directions utilizing iris scissors. V-Y Plasty Text: The defect edges were debeveled with a #15 scalpel blade.  Given the location of the defect, shape of the defect and the proximity to free margins an V-Y advancement flap was deemed most appropriate.  Using a sterile surgical marker, an appropriate advancement flap was drawn incorporating the defect and placing the expected incisions within the relaxed skin tension lines where possible.    The area thus outlined was incised deep to adipose tissue with a #15 scalpel blade.  The skin margins were undermined to an appropriate distance in all directions utilizing iris scissors. Path Notes (To The Dermatopathologist): Please check margins. Validate Date Of Previous Biopsy (Can Hide Date Of Previous Biopsy In Settings Tab): No Complex Repair And Double M Plasty Text: The defect edges were debeveled with a #15 scalpel blade.  The primary defect was closed partially with a complex linear closure.  Given the location of the remaining defect, shape of the defect and the proximity to free margins a double M plasty was deemed most appropriate for complete closure of the defect.  Using a sterile surgical marker, an appropriate advancement flap was drawn incorporating the defect and placing the expected incisions within the relaxed skin tension lines where possible.    The area thus outlined was incised deep to adipose tissue with a #15 scalpel blade.  The skin margins were undermined to an appropriate distance in all directions utilizing iris scissors. Epidermal Sutures: 5-0 Fast Absorbing Gut Epidermal Closure Graft Donor Site (Optional): simple interrupted Graft Donor Site Bandage (Optional-Leave Blank If You Don't Want In Note): Steri-strips and a pressure bandage were applied to the donor site. Complex Repair And O-T Advancement Flap Text: The defect edges were debeveled with a #15 scalpel blade.  The primary defect was closed partially with a complex linear closure.  Given the location of the remaining defect, shape of the defect and the proximity to free margins an O-T advancement flap was deemed most appropriate for complete closure of the defect.  Using a sterile surgical marker, an appropriate advancement flap was drawn incorporating the defect and placing the expected incisions within the relaxed skin tension lines where possible.    The area thus outlined was incised deep to adipose tissue with a #15 scalpel blade.  The skin margins were undermined to an appropriate distance in all directions utilizing iris scissors. Complex Repair And Rotation Flap Text: The defect edges were debeveled with a #15 scalpel blade.  The primary defect was closed partially with a complex linear closure.  Given the location of the remaining defect, shape of the defect and the proximity to free margins a rotation flap was deemed most appropriate for complete closure of the defect.  Using a sterile surgical marker, an appropriate advancement flap was drawn incorporating the defect and placing the expected incisions within the relaxed skin tension lines where possible.    The area thus outlined was incised deep to adipose tissue with a #15 scalpel blade.  The skin margins were undermined to an appropriate distance in all directions utilizing iris scissors. Partial Purse String (Intermediate) Text: Given the location of the defect and the characteristics of the surrounding skin an intermediate purse string closure was deemed most appropriate.  Undermining was performed circumferentially around the surgical defect.  A purse string suture was then placed and tightened. Wound tension of the circular defect prevented complete closure of the wound. Melolabial Interpolation Flap Text: A decision was made to reconstruct the defect utilizing an interpolation axial flap and a staged reconstruction.  A telfa template was made of the defect.  This telfa template was then used to outline the melolabial interpolation flap.  The donor area for the pedicle flap was then injected with anesthesia.  The flap was excised through the skin and subcutaneous tissue down to the layer of the underlying musculature.  The pedicle flap was carefully excised within this deep plane to maintain its blood supply.  The edges of the donor site were undermined.   The donor site was closed in a primary fashion.  The pedicle was then rotated into position and sutured.  Once the tube was sutured into place, adequate blood supply was confirmed with blanching and refill.  The pedicle was then wrapped with xeroform gauze and dressed appropriately with a telfa and gauze bandage to ensure continued blood supply and protect the attached pedicle. Complex Repair And Transposition Flap Text: The defect edges were debeveled with a #15 scalpel blade.  The primary defect was closed partially with a complex linear closure.  Given the location of the remaining defect, shape of the defect and the proximity to free margins a transposition flap was deemed most appropriate for complete closure of the defect.  Using a sterile surgical marker, an appropriate advancement flap was drawn incorporating the defect and placing the expected incisions within the relaxed skin tension lines where possible.    The area thus outlined was incised deep to adipose tissue with a #15 scalpel blade.  The skin margins were undermined to an appropriate distance in all directions utilizing iris scissors. Split-Thickness Skin Graft Text: The defect edges were debeveled with a #15 scalpel blade.  Given the location of the defect, shape of the defect and the proximity to free margins a split thickness skin graft was deemed most appropriate.  Using a sterile surgical marker, the primary defect shape was transferred to the donor site. The split thickness graft was then harvested.  The skin graft was then placed in the primary defect and oriented appropriately. Scalpel Size: 15 blade Rhombic Flap Text: The defect edges were debeveled with a #15 scalpel blade.  Given the location of the defect and the proximity to free margins a rhombic flap was deemed most appropriate.  Using a sterile surgical marker, an appropriate rhombic flap was drawn incorporating the defect.    The area thus outlined was incised deep to adipose tissue with a #15 scalpel blade.  The skin margins were undermined to an appropriate distance in all directions utilizing iris scissors. Purse String (Intermediate) Text: Given the location of the defect and the characteristics of the surrounding skin a pursestring intermediate closure was deemed most appropriate.  Undermining was performed circumfirentially around the surgical defect.  A purstring suture was then placed and tightened. Crescentic Complex Repair Preamble Text (Leave Blank If You Do Not Want): Extensive wide undermining was performed. Complex Repair And Bilobe Flap Text: The defect edges were debeveled with a #15 scalpel blade.  The primary defect was closed partially with a complex linear closure.  Given the location of the remaining defect, shape of the defect and the proximity to free margins a bilobe flap was deemed most appropriate for complete closure of the defect.  Using a sterile surgical marker, an appropriate advancement flap was drawn incorporating the defect and placing the expected incisions within the relaxed skin tension lines where possible.    The area thus outlined was incised deep to adipose tissue with a #15 scalpel blade.  The skin margins were undermined to an appropriate distance in all directions utilizing iris scissors. O-L Flap Text: The defect edges were debeveled with a #15 scalpel blade.  Given the location of the defect, shape of the defect and the proximity to free margins an O-L flap was deemed most appropriate.  Using a sterile surgical marker, an appropriate advancement flap was drawn incorporating the defect and placing the expected incisions within the relaxed skin tension lines where possible.    The area thus outlined was incised deep to adipose tissue with a #15 scalpel blade.  The skin margins were undermined to an appropriate distance in all directions utilizing iris scissors. Complex Repair And Tissue Cultured Epidermal Autograft Text: The defect edges were debeveled with a #15 scalpel blade.  The primary defect was closed partially with a complex linear closure.  Given the location of the defect, shape of the defect and the proximity to free margins an tissue cultured epidermal autograft was deemed most appropriate to repair the remaining defect.  The graft was trimmed to fit the size of the remaining defect.  The graft was then placed in the primary defect, oriented appropriately, and sutured into place. Complex Repair And Ftsg Text: The defect edges were debeveled with a #15 scalpel blade.  The primary defect was closed partially with a complex linear closure.  Given the location of the defect, shape of the defect and the proximity to free margins a full thickness skin graft was deemed most appropriate to repair the remaining defect.  The graft was trimmed to fit the size of the remaining defect.  The graft was then placed in the primary defect, oriented appropriately, and sutured into place. Information: Selecting Yes will display possible errors in your note based on the variables you have selected. This validation is only offered as a suggestion for you. PLEASE NOTE THAT THE VALIDATION TEXT WILL BE REMOVED WHEN YOU FINALIZE YOUR NOTE. IF YOU WANT TO FAX A PRELIMINARY NOTE YOU WILL NEED TO TOGGLE THIS TO 'NO' IF YOU DO NOT WANT IT IN YOUR FAXED NOTE. Advancement Flap (Single) Text: The defect edges were debeveled with a #15 scalpel blade.  Given the location of the defect and the proximity to free margins a single advancement flap was deemed most appropriate.  Using a sterile surgical marker, an appropriate advancement flap was drawn incorporating the defect and placing the expected incisions within the relaxed skin tension lines where possible.    The area thus outlined was incised deep to adipose tissue with a #15 scalpel blade.  The skin margins were undermined to an appropriate distance in all directions utilizing iris scissors. Dorsal Nasal Flap Text: The defect edges were debeveled with a #15 scalpel blade.  Given the location of the defect and the proximity to free margins a dorsal nasal flap was deemed most appropriate.  Using a sterile surgical marker, an appropriate dorsal nasal flap was drawn around the defect.    The area thus outlined was incised deep to adipose tissue with a #15 scalpel blade.  The skin margins were undermined to an appropriate distance in all directions utilizing iris scissors. Saucerization Excision Additional Text (Leave Blank If You Do Not Want): The margin was drawn around the clinically apparent lesion.  Incisions were then made along these lines, in a tangential fashion, to the appropriate tissue plane and the lesion was extirpated. Hatchet Flap Text: The defect edges were debeveled with a #15 scalpel blade.  Given the location of the defect, shape of the defect and the proximity to free margins a hatchet flap was deemed most appropriate.  Using a sterile surgical marker, an appropriate hatchet flap was drawn incorporating the defect and placing the expected incisions within the relaxed skin tension lines where possible.    The area thus outlined was incised deep to adipose tissue with a #15 scalpel blade.  The skin margins were undermined to an appropriate distance in all directions utilizing iris scissors. Dermal Autograft Text: The defect edges were debeveled with a #15 scalpel blade.  Given the location of the defect, shape of the defect and the proximity to free margins a dermal autograft was deemed most appropriate.  Using a sterile surgical marker, the primary defect shape was transferred to the donor site. The area thus outlined was incised deep to adipose tissue with a #15 scalpel blade.  The harvested graft was then trimmed of adipose and epidermal tissue until only dermis was left.  The skin graft was then placed in the primary defect and oriented appropriately. Helical Rim Advancement Flap Text: The defect edges were debeveled with a #15 blade scalpel.  Given the location of the defect and the proximity to free margins (helical rim) a double helical rim advancement flap was deemed most appropriate.  Using a sterile surgical marker, the appropriate advancement flaps were drawn incorporating the defect and placing the expected incisions between the helical rim and antihelix where possible.  The area thus outlined was incised through and through with a #15 scalpel blade.  With a skin hook and iris scissors, the flaps were gently and sharply undermined and freed up. Complex Repair And Melolabial Flap Text: The defect edges were debeveled with a #15 scalpel blade.  The primary defect was closed partially with a complex linear closure.  Given the location of the remaining defect, shape of the defect and the proximity to free margins a melolabial flap was deemed most appropriate for complete closure of the defect.  Using a sterile surgical marker, an appropriate advancement flap was drawn incorporating the defect and placing the expected incisions within the relaxed skin tension lines where possible.    The area thus outlined was incised deep to adipose tissue with a #15 scalpel blade.  The skin margins were undermined to an appropriate distance in all directions utilizing iris scissors. Bilateral Helical Rim Advancement Flap Text: The defect edges were debeveled with a #15 blade scalpel.  Given the location of the defect and the proximity to free margins (helical rim) a bilateral helical rim advancement flap was deemed most appropriate.  Using a sterile surgical marker, the appropriate advancement flaps were drawn incorporating the defect and placing the expected incisions between the helical rim and antihelix where possible.  The area thus outlined was incised through and through with a #15 scalpel blade.  With a skin hook and iris scissors, the flaps were gently and sharply undermined and freed up. Anesthesia Volume In Cc: 10 Tissue Cultured Epidermal Autograft Text: The defect edges were debeveled with a #15 scalpel blade.  Given the location of the defect, shape of the defect and the proximity to free margins a tissue cultured epidermal autograft was deemed most appropriate.  The graft was then trimmed to fit the size of the defect.  The graft was then placed in the primary defect and oriented appropriately. Mercedes Flap Text: The defect edges were debeveled with a #15 scalpel blade.  Given the location of the defect, shape of the defect and the proximity to free margins a Mercedes flap was deemed most appropriate.  Using a sterile surgical marker, an appropriate advancement flap was drawn incorporating the defect and placing the expected incisions within the relaxed skin tension lines where possible. The area thus outlined was incised deep to adipose tissue with a #15 scalpel blade.  The skin margins were undermined to an appropriate distance in all directions utilizing iris scissors. Skin Substitute Text: The defect edges were debeveled with a #15 scalpel blade.  Given the location of the defect, shape of the defect and the proximity to free margins a skin substitute graft was deemed most appropriate.  The graft material was trimmed to fit the size of the defect. The graft was then placed in the primary defect and oriented appropriately. Island Pedicle Flap-Requiring Vessel Identification Text: The defect edges were debeveled with a #15 scalpel blade.  Given the location of the defect, shape of the defect and the proximity to free margins an island pedicle advancement flap was deemed most appropriate.  Using a sterile surgical marker, an appropriate advancement flap was drawn, based on the axial vessel mentioned above, incorporating the defect, outlining the appropriate donor tissue and placing the expected incisions within the relaxed skin tension lines where possible.    The area thus outlined was incised deep to adipose tissue with a #15 scalpel blade.  The skin margins were undermined to an appropriate distance in all directions around the primary defect and laterally outward around the island pedicle utilizing iris scissors.  There was minimal undermining beneath the pedicle flap. Fusiform Excision Additional Text (Leave Blank If You Do Not Want): The margin was drawn around the clinically apparent lesion.  A fusiform shape was then drawn on the skin incorporating the lesion and margins.  Incisions were then made along these lines to the appropriate tissue plane and the lesion was extirpated. Partial Purse String (Simple) Text: Given the location of the defect and the characteristics of the surrounding skin a simple purse string closure was deemed most appropriate.  Undermining was performed circumferentially around the surgical defect.  A purse string suture was then placed and tightened. Wound tension of the circular defect prevented complete closure of the wound. Elliptical Excision Additional Text (Leave Blank If You Do Not Want): The margin was drawn around the clinically apparent lesion.  An elliptical shape was then drawn on the skin incorporating the lesion and margins.  Incisions were then made along these lines to the appropriate tissue plane and the lesion was extirpated. Advancement-Rotation Flap Text: The defect edges were debeveled with a #15 scalpel blade.  Given the location of the defect, shape of the defect and the proximity to free margins an advancement-rotation flap was deemed most appropriate.  Using a sterile surgical marker, an appropriate flap was drawn incorporating the defect and placing the expected incisions within the relaxed skin tension lines where possible. The area thus outlined was incised deep to adipose tissue with a #15 scalpel blade.  The skin margins were undermined to an appropriate distance in all directions utilizing iris scissors. Complex Repair And W Plasty Text: The defect edges were debeveled with a #15 scalpel blade.  The primary defect was closed partially with a complex linear closure.  Given the location of the remaining defect, shape of the defect and the proximity to free margins a W plasty was deemed most appropriate for complete closure of the defect.  Using a sterile surgical marker, an appropriate advancement flap was drawn incorporating the defect and placing the expected incisions within the relaxed skin tension lines where possible.    The area thus outlined was incised deep to adipose tissue with a #15 scalpel blade.  The skin margins were undermined to an appropriate distance in all directions utilizing iris scissors. O-T Advancement Flap Text: The defect edges were debeveled with a #15 scalpel blade.  Given the location of the defect, shape of the defect and the proximity to free margins an O-T advancement flap was deemed most appropriate.  Using a sterile surgical marker, an appropriate advancement flap was drawn incorporating the defect and placing the expected incisions within the relaxed skin tension lines where possible.    The area thus outlined was incised deep to adipose tissue with a #15 scalpel blade.  The skin margins were undermined to an appropriate distance in all directions utilizing iris scissors. Posterior Auricular Interpolation Flap Text: A decision was made to reconstruct the defect utilizing an interpolation axial flap and a staged reconstruction.  A telfa template was made of the defect.  This telfa template was then used to outline the posterior auricular interpolation flap.  The donor area for the pedicle flap was then injected with anesthesia.  The flap was excised through the skin and subcutaneous tissue down to the layer of the underlying musculature.  The pedicle flap was carefully excised within this deep plane to maintain its blood supply.  The edges of the donor site were undermined.   The donor site was closed in a primary fashion.  The pedicle was then rotated into position and sutured.  Once the tube was sutured into place, adequate blood supply was confirmed with blanching and refill.  The pedicle was then wrapped with xeroform gauze and dressed appropriately with a telfa and gauze bandage to ensure continued blood supply and protect the attached pedicle. Complex Repair And M Plasty Text: The defect edges were debeveled with a #15 scalpel blade.  The primary defect was closed partially with a complex linear closure.  Given the location of the remaining defect, shape of the defect and the proximity to free margins an M plasty was deemed most appropriate for complete closure of the defect.  Using a sterile surgical marker, an appropriate advancement flap was drawn incorporating the defect and placing the expected incisions within the relaxed skin tension lines where possible.    The area thus outlined was incised deep to adipose tissue with a #15 scalpel blade.  The skin margins were undermined to an appropriate distance in all directions utilizing iris scissors. Star Wedge Flap Text: The defect edges were debeveled with a #15 scalpel blade.  Given the location of the defect, shape of the defect and the proximity to free margins a star wedge flap was deemed most appropriate.  Using a sterile surgical marker, an appropriate rotation flap was drawn incorporating the defect and placing the expected incisions within the relaxed skin tension lines where possible. The area thus outlined was incised deep to adipose tissue with a #15 scalpel blade.  The skin margins were undermined to an appropriate distance in all directions utilizing iris scissors. Trilobed Flap Text: The defect edges were debeveled with a #15 scalpel blade.  Given the location of the defect and the proximity to free margins a trilobed flap was deemed most appropriate.  Using a sterile surgical marker, an appropriate trilobed flap drawn around the defect.    The area thus outlined was incised deep to adipose tissue with a #15 scalpel blade.  The skin margins were undermined to an appropriate distance in all directions utilizing iris scissors. Complex Repair And Skin Substitute Graft Text: The defect edges were debeveled with a #15 scalpel blade.  The primary defect was closed partially with a complex linear closure.  Given the location of the remaining defect, shape of the defect and the proximity to free margins a skin substitute graft was deemed most appropriate to repair the remaining defect.  The graft was trimmed to fit the size of the remaining defect.  The graft was then placed in the primary defect, oriented appropriately, and sutured into place. Bilobed Flap Text: The defect edges were debeveled with a #15 scalpel blade.  Given the location of the defect and the proximity to free margins a bilobe flap was deemed most appropriate.  Using a sterile surgical marker, an appropriate bilobe flap drawn around the defect.    The area thus outlined was incised deep to adipose tissue with a #15 scalpel blade.  The skin margins were undermined to an appropriate distance in all directions utilizing iris scissors. Island Pedicle Flap Text: The defect edges were debeveled with a #15 scalpel blade.  Given the location of the defect, shape of the defect and the proximity to free margins an island pedicle advancement flap was deemed most appropriate.  Using a sterile surgical marker, an appropriate advancement flap was drawn incorporating the defect, outlining the appropriate donor tissue and placing the expected incisions within the relaxed skin tension lines where possible.    The area thus outlined was incised deep to adipose tissue with a #15 scalpel blade.  The skin margins were undermined to an appropriate distance in all directions around the primary defect and laterally outward around the island pedicle utilizing iris scissors.  There was minimal undermining beneath the pedicle flap. Xenograft Text: The defect edges were debeveled with a #15 scalpel blade.  Given the location of the defect, shape of the defect and the proximity to free margins a xenograft was deemed most appropriate.  The graft was then trimmed to fit the size of the defect.  The graft was then placed in the primary defect and oriented appropriately. Cheek-To-Nose Interpolation Flap Text: A decision was made to reconstruct the defect utilizing an interpolation axial flap and a staged reconstruction.  A telfa template was made of the defect.  This telfa template was then used to outline the Cheek-To-Nose Interpolation flap.  The donor area for the pedicle flap was then injected with anesthesia.  The flap was excised through the skin and subcutaneous tissue down to the layer of the underlying musculature.  The interpolation flap was carefully excised within this deep plane to maintain its blood supply.  The edges of the donor site were undermined.   The donor site was closed in a primary fashion.  The pedicle was then rotated into position and sutured.  Once the tube was sutured into place, adequate blood supply was confirmed with blanching and refill.  The pedicle was then wrapped with xeroform gauze and dressed appropriately with a telfa and gauze bandage to ensure continued blood supply and protect the attached pedicle. O-T Plasty Text: The defect edges were debeveled with a #15 scalpel blade.  Given the location of the defect, shape of the defect and the proximity to free margins an O-T plasty was deemed most appropriate.  Using a sterile surgical marker, an appropriate O-T plasty was drawn incorporating the defect and placing the expected incisions within the relaxed skin tension lines where possible.    The area thus outlined was incised deep to adipose tissue with a #15 scalpel blade.  The skin margins were undermined to an appropriate distance in all directions utilizing iris scissors. Anesthesia Type: 1% lidocaine with epinephrine Complex Repair And Single Advancement Flap Text: The defect edges were debeveled with a #15 scalpel blade.  The primary defect was closed partially with a complex linear closure.  Given the location of the remaining defect, shape of the defect and the proximity to free margins a single advancement flap was deemed most appropriate for complete closure of the defect.  Using a sterile surgical marker, an appropriate advancement flap was drawn incorporating the defect and placing the expected incisions within the relaxed skin tension lines where possible.    The area thus outlined was incised deep to adipose tissue with a #15 scalpel blade.  The skin margins were undermined to an appropriate distance in all directions utilizing iris scissors. Estimated Blood Loss (Cc): minimal Banner Transposition Flap Text: The defect edges were debeveled with a #15 scalpel blade.  Given the location of the defect and the proximity to free margins a Banner transposition flap was deemed most appropriate.  Using a sterile surgical marker, an appropriate flap drawn around the defect. The area thus outlined was incised deep to adipose tissue with a #15 scalpel blade.  The skin margins were undermined to an appropriate distance in all directions utilizing iris scissors. Complex Repair And Modified Advancement Flap Text: The defect edges were debeveled with a #15 scalpel blade.  The primary defect was closed partially with a complex linear closure.  Given the location of the remaining defect, shape of the defect and the proximity to free margins a modified advancement flap was deemed most appropriate for complete closure of the defect.  Using a sterile surgical marker, an appropriate advancement flap was drawn incorporating the defect and placing the expected incisions within the relaxed skin tension lines where possible.    The area thus outlined was incised deep to adipose tissue with a #15 scalpel blade.  The skin margins were undermined to an appropriate distance in all directions utilizing iris scissors. Complex Repair And Rhombic Flap Text: The defect edges were debeveled with a #15 scalpel blade.  The primary defect was closed partially with a complex linear closure.  Given the location of the remaining defect, shape of the defect and the proximity to free margins a rhombic flap was deemed most appropriate for complete closure of the defect.  Using a sterile surgical marker, an appropriate advancement flap was drawn incorporating the defect and placing the expected incisions within the relaxed skin tension lines where possible.    The area thus outlined was incised deep to adipose tissue with a #15 scalpel blade.  The skin margins were undermined to an appropriate distance in all directions utilizing iris scissors. Crescentic Advancement Flap Text: The defect edges were debeveled with a #15 scalpel blade.  Given the location of the defect and the proximity to free margins a crescentic advancement flap was deemed most appropriate.  Using a sterile surgical marker, the appropriate advancement flap was drawn incorporating the defect and placing the expected incisions within the relaxed skin tension lines where possible.    The area thus outlined was incised deep to adipose tissue with a #15 scalpel blade.  The skin margins were undermined to an appropriate distance in all directions utilizing iris scissors. Alar Island Pedicle Flap Text: The defect edges were debeveled with a #15 scalpel blade.  Given the location of the defect, shape of the defect and the proximity to the alar rim an island pedicle advancement flap was deemed most appropriate.  Using a sterile surgical marker, an appropriate advancement flap was drawn incorporating the defect, outlining the appropriate donor tissue and placing the expected incisions within the nasal ala running parallel to the alar rim. The area thus outlined was incised with a #15 scalpel blade.  The skin margins were undermined minimally to an appropriate distance in all directions around the primary defect and laterally outward around the island pedicle utilizing iris scissors.  There was minimal undermining beneath the pedicle flap. V-Y Flap Text: The defect edges were debeveled with a #15 scalpel blade.  Given the location of the defect, shape of the defect and the proximity to free margins a V-Y flap was deemed most appropriate.  Using a sterile surgical marker, an appropriate advancement flap was drawn incorporating the defect and placing the expected incisions within the relaxed skin tension lines where possible.    The area thus outlined was incised deep to adipose tissue with a #15 scalpel blade.  The skin margins were undermined to an appropriate distance in all directions utilizing iris scissors. Excision Depth: adipose tissue Curvilinear Excision Additional Text (Leave Blank If You Do Not Want): The margin was drawn around the clinically apparent lesion.  A curvilinear shape was then drawn on the skin incorporating the lesion and margins.  Incisions were then made along these lines to the appropriate tissue plane and the lesion was extirpated. Excision Method: Fusiform Modified Advancement Flap Text: The defect edges were debeveled with a #15 scalpel blade.  Given the location of the defect, shape of the defect and the proximity to free margins a modified advancement flap was deemed most appropriate.  Using a sterile surgical marker, an appropriate advancement flap was drawn incorporating the defect and placing the expected incisions within the relaxed skin tension lines where possible.    The area thus outlined was incised deep to adipose tissue with a #15 scalpel blade.  The skin margins were undermined to an appropriate distance in all directions utilizing iris scissors. Slit Excision Additional Text (Leave Blank If You Do Not Want): A linear line was drawn on the skin overlying the lesion. An incision was made slowly until the lesion was visualized.  Once visualized, the lesion was removed with blunt dissection. Spiral Flap Text: The defect edges were debeveled with a #15 scalpel blade.  Given the location of the defect, shape of the defect and the proximity to free margins a spiral flap was deemed most appropriate.  Using a sterile surgical marker, an appropriate rotation flap was drawn incorporating the defect and placing the expected incisions within the relaxed skin tension lines where possible. The area thus outlined was incised deep to adipose tissue with a #15 scalpel blade.  The skin margins were undermined to an appropriate distance in all directions utilizing iris scissors. No Repair - Repaired With Adjacent Surgical Defect Text (Leave Blank If You Do Not Want): After the excision the defect was repaired concurrently with another surgical defect which was in close approximation. Double Island Pedicle Flap Text: The defect edges were debeveled with a #15 scalpel blade.  Given the location of the defect, shape of the defect and the proximity to free margins a double island pedicle advancement flap was deemed most appropriate.  Using a sterile surgical marker, an appropriate advancement flap was drawn incorporating the defect, outlining the appropriate donor tissue and placing the expected incisions within the relaxed skin tension lines where possible.    The area thus outlined was incised deep to adipose tissue with a #15 scalpel blade.  The skin margins were undermined to an appropriate distance in all directions around the primary defect and laterally outward around the island pedicle utilizing iris scissors.  There was minimal undermining beneath the pedicle flap. Dressing: dry sterile dressing Keystone Flap Text: The defect edges were debeveled with a #15 scalpel blade.  Given the location of the defect, shape of the defect a keystone flap was deemed most appropriate.  Using a sterile surgical marker, an appropriate keystone flap was drawn incorporating the defect, outlining the appropriate donor tissue and placing the expected incisions within the relaxed skin tension lines where possible. The area thus outlined was incised deep to adipose tissue with a #15 scalpel blade.  The skin margins were undermined to an appropriate distance in all directions around the primary defect and laterally outward around the flap utilizing iris scissors. Melolabial Transposition Flap Text: The defect edges were debeveled with a #15 scalpel blade.  Given the location of the defect and the proximity to free margins a melolabial flap was deemed most appropriate.  Using a sterile surgical marker, an appropriate melolabial transposition flap was drawn incorporating the defect.    The area thus outlined was incised deep to adipose tissue with a #15 scalpel blade.  The skin margins were undermined to an appropriate distance in all directions utilizing iris scissors. Bilobed Transposition Flap Text: The defect edges were debeveled with a #15 scalpel blade.  Given the location of the defect and the proximity to free margins a bilobed transposition flap was deemed most appropriate.  Using a sterile surgical marker, an appropriate bilobe flap drawn around the defect.    The area thus outlined was incised deep to adipose tissue with a #15 scalpel blade.  The skin margins were undermined to an appropriate distance in all directions utilizing iris scissors. Complex Repair And A-T Advancement Flap Text: The defect edges were debeveled with a #15 scalpel blade.  The primary defect was closed partially with a complex linear closure.  Given the location of the remaining defect, shape of the defect and the proximity to free margins an A-T advancement flap was deemed most appropriate for complete closure of the defect.  Using a sterile surgical marker, an appropriate advancement flap was drawn incorporating the defect and placing the expected incisions within the relaxed skin tension lines where possible.    The area thus outlined was incised deep to adipose tissue with a #15 scalpel blade.  The skin margins were undermined to an appropriate distance in all directions utilizing iris scissors. Complex Repair And V-Y Plasty Text: The defect edges were debeveled with a #15 scalpel blade.  The primary defect was closed partially with a complex linear closure.  Given the location of the remaining defect, shape of the defect and the proximity to free margins a V-Y plasty was deemed most appropriate for complete closure of the defect.  Using a sterile surgical marker, an appropriate advancement flap was drawn incorporating the defect and placing the expected incisions within the relaxed skin tension lines where possible.    The area thus outlined was incised deep to adipose tissue with a #15 scalpel blade.  The skin margins were undermined to an appropriate distance in all directions utilizing iris scissors. Home Suture Removal Text: Patient was provided a home suture removal kit and will remove their sutures at home.  If they have any questions or difficulties they will call the office. Epidermal Closure: running Billing Type: Third-Party Bill Purse String (Simple) Text: Given the location of the defect and the characteristics of the surrounding skin a purse string simple closure was deemed most appropriate.  Undermining was performed circumferentially around the surgical defect.  A purse string suture was then placed and tightened. Repair Type: Intermediate Lip Wedge Excision Repair Text: Given the location of the defect and the proximity to free margins a full thickness wedge repair was deemed most appropriate.  Using a sterile surgical marker, the appropriate repair was drawn incorporating the defect and placing the expected incisions perpendicular to the vermillion border.  The vermillion border was also meticulously outlined to ensure appropriate reapproximation during the repair.  The area thus outlined was incised through and through with a #15 scalpel blade.  The muscularis and dermis were reaproximated with deep sutures following hemostasis. Care was taken to realign the vermillion border before proceeding with the superficial closure.  Once the vermillion was realigned the superfical and mucosal closure was finished. Advancement Flap (Double) Text: The defect edges were debeveled with a #15 scalpel blade.  Given the location of the defect and the proximity to free margins a double advancement flap was deemed most appropriate.  Using a sterile surgical marker, the appropriate advancement flaps were drawn incorporating the defect and placing the expected incisions within the relaxed skin tension lines where possible.    The area thus outlined was incised deep to adipose tissue with a #15 scalpel blade.  The skin margins were undermined to an appropriate distance in all directions utilizing iris scissors. Rotation Flap Text: The defect edges were debeveled with a #15 scalpel blade.  Given the location of the defect, shape of the defect and the proximity to free margins a rotation flap was deemed most appropriate.  Using a sterile surgical marker, an appropriate rotation flap was drawn incorporating the defect and placing the expected incisions within the relaxed skin tension lines where possible.    The area thus outlined was incised deep to adipose tissue with a #15 scalpel blade.  The skin margins were undermined to an appropriate distance in all directions utilizing iris scissors. Mucosal Advancement Flap Text: Given the location of the defect, shape of the defect and the proximity to free margins a mucosal advancement flap was deemed most appropriate. Incisions were made with a 15 blade scalpel in the appropriate fashion along the cutaneous vermilion border and the mucosal lip. The remaining actinically damaged mucosal tissue was excised.  The mucosal advancement flap was then elevated to the gingival sulcus with care taken to preserve the neurovascular structures and advanced into the primary defect. Care was taken to ensure that precise realignment of the vermilion border was achieved. Detail Level: Detailed O-Z Plasty Text: The defect edges were debeveled with a #15 scalpel blade.  Given the location of the defect, shape of the defect and the proximity to free margins an O-Z plasty (double transposition flap) was deemed most appropriate.  Using a sterile surgical marker, the appropriate transposition flaps were drawn incorporating the defect and placing the expected incisions within the relaxed skin tension lines where possible.    The area thus outlined was incised deep to adipose tissue with a #15 scalpel blade.  The skin margins were undermined to an appropriate distance in all directions utilizing iris scissors.  Hemostasis was achieved with electrocautery.  The flaps were then transposed into place, one clockwise and the other counterclockwise, and anchored with interrupted buried subcutaneous sutures. Hemostasis: Electrocautery Consent was obtained from the patient. The risks and benefits to therapy were discussed in detail. Specifically, the risks of infection, scarring, bleeding, prolonged wound healing, incomplete removal, allergy to anesthesia, nerve injury and recurrence were addressed. Prior to the procedure, the treatment site was clearly identified and confirmed by the patient. All components of Universal Protocol/PAUSE Rule completed. Complex Repair And Dermal Autograft Text: The defect edges were debeveled with a #15 scalpel blade.  The primary defect was closed partially with a complex linear closure.  Given the location of the defect, shape of the defect and the proximity to free margins an dermal autograft was deemed most appropriate to repair the remaining defect.  The graft was trimmed to fit the size of the remaining defect.  The graft was then placed in the primary defect, oriented appropriately, and sutured into place. A-T Advancement Flap Text: The defect edges were debeveled with a #15 scalpel blade.  Given the location of the defect, shape of the defect and the proximity to free margins an A-T advancement flap was deemed most appropriate.  Using a sterile surgical marker, an appropriate advancement flap was drawn incorporating the defect and placing the expected incisions within the relaxed skin tension lines where possible.    The area thus outlined was incised deep to adipose tissue with a #15 scalpel blade.  The skin margins were undermined to an appropriate distance in all directions utilizing iris scissors. Complex Repair And Xenograft Text: The defect edges were debeveled with a #15 scalpel blade.  The primary defect was closed partially with a complex linear closure.  Given the location of the defect, shape of the defect and the proximity to free margins a xenograft was deemed most appropriate to repair the remaining defect.  The graft was trimmed to fit the size of the remaining defect.  The graft was then placed in the primary defect, oriented appropriately, and sutured into place. S Plasty Text: Given the location and shape of the defect, and the orientation of relaxed skin tension lines, an S-plasty was deemed most appropriate for repair.  Using a sterile surgical marker, the appropriate outline of the S-plasty was drawn, incorporating the defect and placing the expected incisions within the relaxed skin tension lines where possible.  The area thus outlined was incised deep to adipose tissue with a #15 scalpel blade.  The skin margins were undermined to an appropriate distance in all directions utilizing iris scissors. The skin flaps were advanced over the defect.  The opposing margins were then approximated with interrupted buried subcutaneous sutures. Complex Repair And Dorsal Nasal Flap Text: The defect edges were debeveled with a #15 scalpel blade.  The primary defect was closed partially with a complex linear closure.  Given the location of the remaining defect, shape of the defect and the proximity to free margins a dorsal nasal flap was deemed most appropriate for complete closure of the defect.  Using a sterile surgical marker, an appropriate flap was drawn incorporating the defect and placing the expected incisions within the relaxed skin tension lines where possible.    The area thus outlined was incised deep to adipose tissue with a #15 scalpel blade.  The skin margins were undermined to an appropriate distance in all directions utilizing iris scissors. Mastoid Interpolation Flap Text: A decision was made to reconstruct the defect utilizing an interpolation axial flap and a staged reconstruction.  A telfa template was made of the defect.  This telfa template was then used to outline the mastoid interpolation flap.  The donor area for the pedicle flap was then injected with anesthesia.  The flap was excised through the skin and subcutaneous tissue down to the layer of the underlying musculature.  The pedicle flap was carefully excised within this deep plane to maintain its blood supply.  The edges of the donor site were undermined.   The donor site was closed in a primary fashion.  The pedicle was then rotated into position and sutured.  Once the tube was sutured into place, adequate blood supply was confirmed with blanching and refill.  The pedicle was then wrapped with xeroform gauze and dressed appropriately with a telfa and gauze bandage to ensure continued blood supply and protect the attached pedicle. Z Plasty Text: The lesion was extirpated to the level of the fat with a #15 scalpel blade.  Given the location of the defect, shape of the defect and the proximity to free margins a Z-plasty was deemed most appropriate for repair.  Using a sterile surgical marker, the appropriate transposition arms of the Z-plasty were drawn incorporating the defect and placing the expected incisions within the relaxed skin tension lines where possible.    The area thus outlined was incised deep to adipose tissue with a #15 scalpel blade.  The skin margins were undermined to an appropriate distance in all directions utilizing iris scissors.  The opposing transposition arms were then transposed into place in opposite direction and anchored with interrupted buried subcutaneous sutures. Ear Star Wedge Flap Text: The defect edges were debeveled with a #15 blade scalpel.  Given the location of the defect and the proximity to free margins (helical rim) an ear star wedge flap was deemed most appropriate.  Using a sterile surgical marker, the appropriate flap was drawn incorporating the defect and placing the expected incisions between the helical rim and antihelix where possible.  The area thus outlined was incised through and through with a #15 scalpel blade. Repair Performed By Another Provider Text (Leave Blank If You Do Not Want): After the tissue was excised the defect was repaired by another provider. Cartilage Graft Text: The defect edges were debeveled with a #15 scalpel blade.  Given the location of the defect, shape of the defect, the fact the defect involved a full thickness cartilage defect a cartilage graft was deemed most appropriate.  An appropriate donor site was identified, cleansed, and anesthetized. The cartilage graft was then harvested and transferred to the recipient site, oriented appropriately and then sutured into place.  The secondary defect was then repaired using a primary closure. Transposition Flap Text: The defect edges were debeveled with a #15 scalpel blade.  Given the location of the defect and the proximity to free margins a transposition flap was deemed most appropriate.  Using a sterile surgical marker, an appropriate transposition flap was drawn incorporating the defect.    The area thus outlined was incised deep to adipose tissue with a #15 scalpel blade.  The skin margins were undermined to an appropriate distance in all directions utilizing iris scissors. Bi-Rhombic Flap Text: The defect edges were debeveled with a #15 scalpel blade.  Given the location of the defect and the proximity to free margins a bi-rhombic flap was deemed most appropriate.  Using a sterile surgical marker, an appropriate rhombic flap was drawn incorporating the defect. The area thus outlined was incised deep to adipose tissue with a #15 scalpel blade.  The skin margins were undermined to an appropriate distance in all directions utilizing iris scissors. Size Of Margin In Cm: 0.4 Pre-Excision Curettage Text (Leave Blank If You Do Not Want): Prior to drawing the surgical margin the visible lesion was removed with electrodesiccation and curettage to clearly define the lesion size. Rhomboid Transposition Flap Text: The defect edges were debeveled with a #15 scalpel blade.  Given the location of the defect and the proximity to free margins a rhomboid transposition flap was deemed most appropriate.  Using a sterile surgical marker, an appropriate rhomboid flap was drawn incorporating the defect.    The area thus outlined was incised deep to adipose tissue with a #15 scalpel blade.  The skin margins were undermined to an appropriate distance in all directions utilizing iris scissors. Perilesional Excision Additional Text (Leave Blank If You Do Not Want): The margin was drawn around the clinically apparent lesion. Incisions were then made along these lines to the appropriate tissue plane and the lesion was extirpated. Interpolation Flap Text: A decision was made to reconstruct the defect utilizing an interpolation axial flap and a staged reconstruction.  A telfa template was made of the defect.  This telfa template was then used to outline the interpolation flap.  The donor area for the pedicle flap was then injected with anesthesia.  The flap was excised through the skin and subcutaneous tissue down to the layer of the underlying musculature.  The interpolation flap was carefully excised within this deep plane to maintain its blood supply.  The edges of the donor site were undermined.   The donor site was closed in a primary fashion.  The pedicle was then rotated into position and sutured.  Once the tube was sutured into place, adequate blood supply was confirmed with blanching and refill.  The pedicle was then wrapped with xeroform gauze and dressed appropriately with a telfa and gauze bandage to ensure continued blood supply and protect the attached pedicle. Complex Repair And Z Plasty Text: The defect edges were debeveled with a #15 scalpel blade.  The primary defect was closed partially with a complex linear closure.  Given the location of the remaining defect, shape of the defect and the proximity to free margins a Z plasty was deemed most appropriate for complete closure of the defect.  Using a sterile surgical marker, an appropriate advancement flap was drawn incorporating the defect and placing the expected incisions within the relaxed skin tension lines where possible.    The area thus outlined was incised deep to adipose tissue with a #15 scalpel blade.  The skin margins were undermined to an appropriate distance in all directions utilizing iris scissors. Ftsg Text: The defect edges were debeveled with a #15 scalpel blade.  Given the location of the defect, shape of the defect and the proximity to free margins a full thickness skin graft was deemed most appropriate.  Using a sterile surgical marker, the primary defect shape was transferred to the donor site. The area thus outlined was incised deep to adipose tissue with a #15 scalpel blade.  The harvested graft was then trimmed of adipose tissue until only dermis and epidermis was left.  The skin margins of the secondary defect were undermined to an appropriate distance in all directions utilizing iris scissors.  The secondary defect was closed with interrupted buried subcutaneous sutures.  The skin edges were then re-apposed with running  sutures.  The skin graft was then placed in the primary defect and oriented appropriately. Epidermal Autograft Text: The defect edges were debeveled with a #15 scalpel blade.  Given the location of the defect, shape of the defect and the proximity to free margins an epidermal autograft was deemed most appropriate.  Using a sterile surgical marker, the primary defect shape was transferred to the donor site. The epidermal graft was then harvested.  The skin graft was then placed in the primary defect and oriented appropriately.

## 2020-11-06 ENCOUNTER — VIRTUAL VISIT (OUTPATIENT)
Dept: FAMILY MEDICINE | Facility: OTHER | Age: 80
End: 2020-11-06
Payer: MEDICARE

## 2020-11-06 DIAGNOSIS — M75.42 IMPINGEMENT SYNDROME, SHOULDER, LEFT: Primary | ICD-10-CM

## 2020-11-06 DIAGNOSIS — E78.5 HYPERLIPIDEMIA, UNSPECIFIED HYPERLIPIDEMIA TYPE: ICD-10-CM

## 2020-11-06 PROCEDURE — 99441 PR PHYSICIAN TELEPHONE EVALUATION 5-10 MIN: CPT | Mod: 95 | Performed by: FAMILY MEDICINE

## 2020-11-06 NOTE — PROGRESS NOTES
"Loni Ybarra is a 79 year old female who is being evaluated via a billable telephone visit.      The patient has been notified of following:     \"This telephone visit will be conducted via a call between you and your physician/provider. We have found that certain health care needs can be provided without the need for a physical exam.  This service lets us provide the care you need with a short phone conversation.  If a prescription is necessary we can send it directly to your pharmacy.  If lab work is needed we can place an order for that and you can then stop by our lab to have the test done at a later time.    Telephone visits are billed at different rates depending on your insurance coverage. During this emergency period, for some insurers they may be billed the same as an in-person visit.  Please reach out to your insurance provider with any questions.    If during the course of the call the physician/provider feels a telephone visit is not appropriate, you will not be charged for this service.\"    Patient has given verbal consent for Telephone visit?  Yes    What phone number would you like to be contacted at? [phone]    How would you like to obtain your AVS? Mail a copy    Subjective     Loni Ybarra is a 79 year old female who presents via phone visit today for the following health issues:    HPI     Concern - left arm pain- no known injury  Onset: middle of summer  Description: left arm pain-  Radiates to neck  Progression of Symptoms:  intermittent  Accompanying Signs & Symptoms: none  Precipitating factors:        Worsened by: movement or lifting things  Alleviating factors:        Improved by: rest  Therapies tried and outcome: biofreeze    At first thought she picked up something too heavy.  Feels sore most of the time, worsens when she lifts or picks up something.  Pain is from the elbow to the shoulder, sometimes goes across the upper back.  Can get worse when rolls over in bed.  Denies " numbness/tingling in her hand.  Sometimes has pain in the back of her neck.  Does get pain when she raises her arm over her head or if she has arm outstretched, turned over and across her body.  Once in a while will have similar pain on the right.      Review of Systems   CONSTITUTIONAL: NEGATIVE for fever, chills, change in weight       Objective          Vitals:  No vitals were obtained today due to virtual visit.    Gen: alert and no distress  PSYCH: Alert and oriented times 3; coherent speech, normal   rate and volume, able to articulate logical thoughts, able   to abstract reason, no tangential thoughts, no hallucinations   or delusions  Her affect is normal  RESP: No cough, no audible wheezing, able to talk in full sentences  Remainder of exam unable to be completed due to telephone visits          Assessment/Plan:    Assessment & Plan     Impingement syndrome, shoulder, left  Attempted to explain some exam over the phone and she did endorse pain with overhead extension as well as the empty can sign.  Suspect she has shoulder impingement.  Because of her kidney disease will avoid NSAIDs.  Offered sports medicine consult for possible steroid injection but she declines at this time.  She plans to go back to exercise that she has been doing on her own.  She then asked me about which exercises to do and I offered to refer her to physical therapy but she declines that as well.  We discussed that if her pain worsens or she does not feel she can cope with that that she can contact me and we will plan to place referral to sports medicine at that time.    Hyperlipidemia, unspecified hyperlipidemia type  Patient also had inquired previously about stopping atorvastatin as she was concerned that this could be causing some of her aches and pains.  She does have diabetes and a history of heart disease she is also 79 years old and would like to pare back on her medications.  I feel it is reasonable to stop her atorvastatin  at this time and see if this helps out with her pain at all.          Mackenzie Jerome MD  [facility]    Phone call duration:  10 minutes

## 2020-11-12 ENCOUNTER — TELEPHONE (OUTPATIENT)
Dept: FAMILY MEDICINE | Facility: CLINIC | Age: 80
End: 2020-11-12

## 2020-11-12 ENCOUNTER — OFFICE VISIT (OUTPATIENT)
Dept: FAMILY MEDICINE | Facility: CLINIC | Age: 80
End: 2020-11-12
Payer: MEDICARE

## 2020-11-12 VITALS
OXYGEN SATURATION: 96 % | BODY MASS INDEX: 32.32 KG/M2 | HEART RATE: 72 BPM | SYSTOLIC BLOOD PRESSURE: 174 MMHG | WEIGHT: 187 LBS | RESPIRATION RATE: 14 BRPM | TEMPERATURE: 98.1 F | DIASTOLIC BLOOD PRESSURE: 82 MMHG

## 2020-11-12 DIAGNOSIS — Z91.81 RISK FOR FALLS: ICD-10-CM

## 2020-11-12 DIAGNOSIS — R41.3 MEMORY LOSS: ICD-10-CM

## 2020-11-12 DIAGNOSIS — R29.898 TRANSIENT WEAKNESS OF RIGHT LOWER EXTREMITY: Primary | ICD-10-CM

## 2020-11-12 DIAGNOSIS — M75.22 BICEPS TENDONITIS, LEFT: ICD-10-CM

## 2020-11-12 PROCEDURE — 80048 BASIC METABOLIC PNL TOTAL CA: CPT | Performed by: FAMILY MEDICINE

## 2020-11-12 PROCEDURE — 99215 OFFICE O/P EST HI 40 MIN: CPT | Performed by: FAMILY MEDICINE

## 2020-11-12 NOTE — PATIENT INSTRUCTIONS
"  Patient Education   Coping with Memory Loss  What happens when you have memory loss?  Memory loss causes problems with thinking, learning and memory. You may feel forgetful or have trouble focusing on tasks.  Memory loss may be a side effect of medicine, chemotherapy or radiation. In these cases, problems with memory may improve after you finish your treatment. But you could have long-term problems.  Other factors that affect memory and your ability to focus include:    Aging    Illness    Depression    Hormonal changes    Anemia (low red blood cells)    Stress.  What can I do to treat or prevent memory loss?  Problems with thinking and memory can be very frustrating. There is no standard treatment at this time. But there are things you can do to reduce the effects of memory loss:    Really pay attention. Use your eyes, ears and sense of touch to remember things. Focus when someone tells you something.    Limit distractions when you need to complete tasks that require you to focus.    Tell yourself what you are doing as you do it. For example, if you're going out for a few hours, as you close the door tell yourself, \"I'm locking the door now and putting the key in my pocket so I don't have to worry about whether I locked the door.\"    Give yourself clear reminders. If you need to buy dog food, put the empty bag at the door so you'll see it as you leave the house. Or write yourself a note and put it where it's needed.    Keep things in the same place. This way you don't have to wonder where you put your keys, remote control or medicine.    Keep a journal of daily events and activities. Carry a notebook and write down important information that you want to remember.    Exercise your brain. For example, do crossword puzzles, painting, sudoku.    Use word play and rhyming to help remember things.    Use helpful tools, such as:  ? A daily planner  ? A wall calendar  ? Checklists  ? Sticky notes  ? A  near the " door  ? A pre-programmed phone  ? A wristwatch with an alarm  ? A pill box to keep track of your medicines.    Get plenty of sleep and exercise each day.    Stay positive, and try to manage stress.    If you think you may be depressed, talk to your doctor. Depression should be treated.  When should I call my care team?  Call your care team if:    You feel depressed.    You cannot complete basic daily activities.  Comments:  __________________________________________  __________________________________________  __________________________________________  __________________________________________  __________________________________________  __________________________________________  __________________________________________  For informational purposes only. Not to replace the advice of your health care provider. Copyright   2006 [employer]. All rights reserved. Clinically reviewed by [employer]. SMARTworks [id number] - REV 04/19.

## 2020-11-12 NOTE — TELEPHONE ENCOUNTER
Provider requested triage as patient is on his schedule for 1140 today.    Left message for patient to return call to any triage RN.    Chuckie Hanks, ESN, RN, PHN

## 2020-11-12 NOTE — PROGRESS NOTES
"Subjective     Loni Ybarra is a 79 year old female who presents to clinic today for the following health issues:    HPI          Patient is here today with family for multiple concerns.  Overall she is a poor historian.  She is present today with her sister-in-law.  She has been having left-sided shoulder pain down to her elbow for approximately 1 month.  It is worse with trying to lift things.  Occasionally the arm will also feel heavy.  She denies any chest pain, shortness of breath, headache, vision, hearing changes.  She does not recall any fall or trauma; however, states her memory is not that good and she may have fallen to catch herself causing her symptoms.    She does have history of cognitive disorder.  B12 was checked last along with MRI which showed signs of possible vascular weighted white matter loss.    Additionally, the reason she came in, was yesterday she was having a hard time walking due to right leg weakness.  He states she felt so fatigued she went to bed did not take her blood pressure medication.  She woke up this morning and symptoms had resolved.  She denies any back pain, urinary incontinence, or other symptoms.  She has previously broke this leg in the past and has always \"given her issues \".  Yesterday was more concerned in the family.    They are also worried about her ability to drive.  She currently lives alone despite having a .  The lives separately as he is not enjoyable to be around with.  She does have friends who she likes to see and drive to, so it is a big part of her life.    She has no other concerns today.    Review of Systems   Constitutional, HEENT, neuro, lymph, cardiovascular, pulmonary, gi and gu systems are negative, except as otherwise noted.      Objective    BP (!) 174/82   Pulse 72   Temp 98.1  F (36.7  C) (Temporal)   Resp 14   Wt 84.8 kg (187 lb)   SpO2 96%   BMI 32.32 kg/m    Body mass index is 32.32 kg/m .  Physical Exam   General: Appears " well and in no acute distress.  HEENT: Eyes grossly normal to inspection. Extraocular movements intact. Pupils equal, round, and reactive to light. Mucous membranes moist. No ulcers or lesions noted in the oropharynx.  Hearing aids present.  TMs and ear canals normal.  Neuro: Antalgic gait.  Unable to recall 3 objects on Mini-Mental exam or draw a clock correctly.  Clear coherent speech. Good  strength. 5/5 strength of wrist, elbow, shoulder, hip, knee, and ankle flexion and extension. 5/5 strength of shoulder and hip adduction and abduction. Light touch sensation of upper and lower extremities intact.  Antalgic gait.  No ankle clonus.  Cardiovascular: Regular rate and rhythm, normal S1 and S2 without murmur. No extra heartsounds or friction rub. Radial pulses present and equal bilaterally.  Respiratory: Lungs clear to auscultation bilaterally. No wheezing or crackles. No prolonged expiration. Symmetrical chest rise.  Musculoskeletal: Pain to palpation over the long head of the biceps.  Reproducible pain with resisted elbow flexion.  Range of motion of upper extremities equal with forward flexion, abduction.  Nontender over the clavicles and AC joint.  No gross extremity deformities. No peripheral edema. Normal muscle bulk.     Reviewed BMP from earlier today.  B12 from last year normal.  MRI pending        Assessment & Plan   1. Transient weakness of right lower extremity: Does not appear to have any residual deficits.  Consider TIA versus sciatica.  Given declining memory will also order MRI.  Discussed concern given memory and lower extremity weakness she may not have the functional capacity to operate a motor vehicle safely.  Urged not to drive at this time.  She would like a formal evaluation done.  I recommend she get this done at [facility] as this is the closest location.  They will schedule this.  We will have physical therapy work with her on lower extremity weakness.  Call with results when  available.  Blood pressure elevated in clinic today; however, did not take blood pressure medications yesterday.  No other neurological signs concerning for stroke and has good strength in lower extremity today.  I urged that if symptoms return or new neurological symptoms develop she go to the emergency department for stroke evaluation.  They are agreeable with this plan.  - MR Brain w/o Contrast; Future    2. Risk for falls: Recommend physical therapy for gait training due to lower extremity weakness as above.  Additionally would prefer home safety eval.  Occupational Therapy order placed.  - OCCUPATIONAL THERAPY REFERRAL; Future    3. Memory loss: MRI as above.  Likely due to vascular dementia.  Had issues with Aricept in the past and does not want to go back on this medication.  Discussed likely continued decline and possible delirium episodes in the future.  Can do formal cognitive testing with occupational therapy if desired.  B12 previously normal.  MRI showing white matter less consistent with vascular disease.  - MR Brain w/o Contrast; Future  - OCCUPATIONAL THERAPY REFERRAL; Future    4. Biceps tendonitis, left:   - PHYSICAL THERAPY REFERRAL; Future     Return in about 1 week (around 11/19/2020), or if symptoms worsen or fail to improve.    Michele Morton MD  [facility]     This chart is completed utilizing dictation software; typos and/or incorrect word substitutions may unintentionally occur.      Approximately 50 minutes were spent with this patient over 50% of the time spent counseling patient and family member.

## 2020-11-13 ENCOUNTER — TELEPHONE (OUTPATIENT)
Dept: FAMILY MEDICINE | Facility: OTHER | Age: 80
End: 2020-11-13

## 2020-11-13 NOTE — TELEPHONE ENCOUNTER
Left message for patient to return call to clinic. When call is returned please inform patient she has refills on her medications- her Losartan has refills on it at [facility] in [place], [place] and her Metoprolol has refills at [facility].     Camille Casarez MA

## 2020-11-13 NOTE — TELEPHONE ENCOUNTER
Pt is calling to get her blood pressure medications refilled. There are two medications that she gets. She only has a few left. Please send over to the pharmacy.  Please advise.

## 2020-11-14 ENCOUNTER — MYC MEDICAL ADVICE (OUTPATIENT)
Dept: FAMILY MEDICINE | Facility: OTHER | Age: 80
End: 2020-11-14

## 2020-11-14 ENCOUNTER — NURSE TRIAGE (OUTPATIENT)
Dept: NURSING | Facility: CLINIC | Age: 80
End: 2020-11-14

## 2020-11-14 NOTE — TELEPHONE ENCOUNTER
"metoprolol succinate ER (TOPROL-XL) 50 MG 24 hr tablet 90 tablet 1 7/16/2020  No   Sig - Route: Take 1 tablet (50 mg) by mouth daily -      losartan (COZAAR) 100 MG tablet 90 tablet 3 9/29/2020  No   Sig - Route: Take 1 tablet (100 mg) by mouth daily - Oral     On cephalexin 500 mg for UTI from [place] [facility].  calling to confirm which medications show up in her chart.  Maryellen Monge RN  [facility]    Additional Information    Negative: Drug overdose and nurse unable to answer question    Negative: Caller requesting information not related to medicine    Negative: Caller requesting a prescription for Strep throat and has a positive culture result    Negative: Rash while taking a medication or within 3 days of stopping it    Negative: Immunization reaction suspected    Negative: [1] Asthma AND [2] having symptoms of asthma (cough, wheezing, etc)    Negative: MORE THAN A DOUBLE DOSE of a prescription or over-the-counter (OTC) drug    Negative: [1] DOUBLE DOSE (an extra dose or lesser amount) of over-the-counter (OTC) drug AND [2] any symptoms (e.g., dizziness, nausea, pain, sleepiness)    Negative: [1] DOUBLE DOSE (an extra dose or lesser amount) of prescription drug AND [2] any symptoms (e.g., dizziness, nausea, pain, sleepiness)    Negative: Took another person's prescription drug    Negative: [1] DOUBLE DOSE (an extra dose or lesser amount) of prescription drug AND [2] NO symptoms (Exception: a double dose of antibiotics)    Negative: Diabetes drug error or overdose (e.g., insulin or extra dose)    Negative: [1] Request for URGENT new prescription or refill of \"essential\" medication (i.e., likelihood of harm to patient if not taken) AND [2] triager unable to fill per unit policy    Negative: [1] Prescription not at pharmacy AND [2] was prescribed today by PCP    Negative: Pharmacy calling with prescription questions and triager unable to answer question    Negative: Caller has urgent medication " question about med that PCP prescribed and triager unable to answer question    Negative: Caller has NON-URGENT medication question about med that PCP prescribed and triager unable to answer question    Negative: Caller requesting a NON-URGENT new prescription or refill and triager unable to refill per unit policy    Negative: Caller has medication question about med not prescribed by PCP and triager unable to answer question (e.g., compatibility with other med, storage)    Negative: [1] DOUBLE DOSE (an extra dose or lesser amount) of over-the-counter (OTC) drug AND [2] NO symptoms    Negative: [1] DOUBLE DOSE (an extra dose or lesser amount) of antibiotic drug AND [2] NO symptoms    Caller has medication question only, adult not sick, and triager answers question    Protocols used: MEDICATION QUESTION CALL-A-

## 2020-11-16 ENCOUNTER — MYC MEDICAL ADVICE (OUTPATIENT)
Dept: FAMILY MEDICINE | Facility: OTHER | Age: 80
End: 2020-11-16

## 2020-11-16 DIAGNOSIS — R54 FRAILTY: Primary | ICD-10-CM

## 2020-11-16 NOTE — TELEPHONE ENCOUNTER
Provider please review and advise on where you would like her BP readings to be, will then reply to pt.    Jessica Hughes CMA (AAMA)

## 2020-11-17 ENCOUNTER — HOSPITAL ENCOUNTER (OUTPATIENT)
Dept: OCCUPATIONAL THERAPY | Facility: CLINIC | Age: 80
Setting detail: THERAPIES SERIES
End: 2020-11-17
Attending: FAMILY MEDICINE
Payer: MEDICARE

## 2020-11-17 DIAGNOSIS — Z91.81 RISK FOR FALLS: ICD-10-CM

## 2020-11-17 DIAGNOSIS — R41.3 MEMORY LOSS: ICD-10-CM

## 2020-11-17 PROCEDURE — 96125 COGNITIVE TEST BY HC PRO: CPT | Mod: GO

## 2020-11-17 NOTE — PROGRESS NOTES
Cognitive Performance Test    SUMMARY OF TEST:    The Cognitive Performance Test (CPT) is a standardized performance-based assessment to measure working memory/executive function processing capacities that underlie functional performance. Subtasks include common basic and instrumental activities of daily living (ADL/IADL) which are rated based on the manner in which patients respond to task demands of varying complexity. The total CPT score describes a level of functioning that indicates how information is processed, implications for functional activities, potential safety risks and a recommended level of supervision or assist based on cognitive function. The highest total score on this test is in the range of 5.6 to 5.8.    DATE OF TESTIN2020    RESULTS OF TESTING:                                                                                         CPT Subtest Results    MEDBOX: 4.5/6 SHOP/GLOVES: 4/6 PHONE:    WASH:   TRAVEL: Not tested/ TOAST:    DRESS: Not tested/5      TOTAL CPT SCORE:  20.5/28     Average CPT Score  4.1/5.6    INTERPRETATION OF TEST RESULTS:    Based on the Cognitive Performance Test, this patient scored at CPT Level 4.1  See CPT Levels reference below.    Summary of functional cognitive status:   Completed CPT on 2020, scoring 4.1. The following recommendations were given.  supervision, supervision/assistance required for new medications. Due to increased ability to get lost in unfamiliar surroundings at a level 4, it's recommended for pt not to drive. Pt required increased processing time to problem solve and to follow verbal directions given during assessment. Demonstrated difficulty with planning and problem solving. Behavior is goal-directed but has difficulty following multi-step directions. Complex tasks such as money or home management may require assistance. Pt's independence will be enhanced by use of routines, simple concrete directions, and set-up  tasks.     Factors affecting performance:  Currently taking medication to treat UTI.                 Recommendations:    Assist for ADL/IADL:   Meal preparation, Shopping, Finances, Driving and Medication management  Supervision for ADL/IADL:  ADL and Laundry  Supervision in living settin hour                                                TIME ADMINISTERING TEST: 55 minutes    TIME FOR INTERPRETATION AND PREPARATION OF REPORT: 10 minutes    TOTAL TIME: 65 minutes      CPT Levels Reference:    Patient's Average CPT Score:  4.1                                                                                                                                                  Individual scores range along a continuum as outlined below.  In addition to cognitive status, other factors may affect safety in a home environment.  Please refer to specific recommendations for this patient.    ___5.6-5.8  Normal functioning (absence of cognitive-functional disability).  Independent in managing personal affairs, monitors and directs own behavior.  Uses complex information to carry out daily activities with safety and accuracy.    Proficient with instrumental activities of daily living (IADL) and learning new activity.  Problems are anticipated, errors are avoided, and consequences of actions are considered.      ___5.0   Mild cognitive-functional disability; deficits in working memory and executive thought processes. Difficulty using complex information. Problems may be observed with recent memory, judgment, reasoning and planning ahead. May be impulsive or have difficulty anticipating consequences.  Safety:  May require assistance to plan ahead; or to manage complex medication schedules, appointments or finances.  Hazardous activities may need to be monitored or limited.  ADL:  Mild functional decline.  Able to complete basic self-care and routine household tasks.  May have difficulty with complex daily tasks such as reading,  "writing, meal preparation, shopping or driving.   Learns through hands on teaching. Self-centered behavior or difficulty considering the needs of others may be seen related to trouble seeing the  whole picture\". Can appear disorganized or uninhibited.    ___4.5  Mild to moderate cognitive-functional disability. Significant deficits in working memory and executive thought processes. Judgment, reasoning and planning show obvious impairment.  Distractible with inability to shift attention/actions given competing stimuli.  Difficulty with problem solving and managing details. Complex daily tasks performed with inconsistency, difficulty, or error.     Safety:  Medications should be monitored, stove use may require supervision, and driving ability may be affected.  Impaired safety awareness with inability to anticipate potential problems.  May not recognize or respond to emergent situations. Requires frequent check-in support.   ADL:  Mild difficulty with simple everyday self-care tasks. Benefits from structured, routine activity.  Will likely need reminders to complete tasks outside of the routine. Requires assistance with planning and IADL tasks like shopping and finances. Learns concrete tasks through repetition, but performance may not generalize. Tends to be impulsive with poor insight. Self centered behavior or inability to consider the needs of others is common.    __x_4.0  Moderate cognitive-functional disability; abstract to concrete thought processes. Working memory and executive function impairments are obvious. Difficulty with planning and problem solving.  Behavior is goal-directed, but unable to follow multi-step directions, is easily distracted, and may not recognize mistakes.  Inability to anticipate hazards or understand precautions.  Safety:  Recommend 24-hour supervision for safety. Supervision needed for medication management and for hazardous activities. May not be able to follow a restricted diet. " Can get lost in unfamiliar surroundings. Generally, persons functioning at level 4 should not be driving.   ADL:  Some decline in quality or frequency of ADL.  Nathrop enhanced by use of a routine, simple concrete directions, and caregiver set-up of needed items. Complex tasks such as money or home management typically requires assistance.  Relies heavily on vision to guide behavior; will ignore objects/hazards not in plain sight and can be distracted by irrelevant objects. Often has poor insight.  Able to carry out social conversation and may verbally  cover  for deficits leading caregivers to believe they are capable of functioning independently.       ___3.5  Moderate cognitive-functional disability; increased cues needed for task completion. Aware of concrete task steps but needs prompting or cues to initiate and complete simple tasks. Attention span is limited, simple directions may need to be repeated, and re-focus to a topic or task may be required.  Safety:  24-hour supervision required for safety and for assistance with daily tasks. Assistance required with medications, and access to medication should be limited. Meals, nutrition and dietary restrictions need to be monitored.  All hazardous activities should be restricted or supervised. Should not drive. Prone to wandering and can become lost.  ADL:  Moderate functional decline. Familiar tasks usually requires set-up of supplies and directions to complete steps. May need objects handed to them for task initiation. Function best with a set schedule in familiar surroundings with familiar people. All complex tasks must be done by others. Vocabulary is diminished and speech often unfocused.       Hanna Ramires, OTR/L   [facility]

## 2020-11-18 ENCOUNTER — MYC MEDICAL ADVICE (OUTPATIENT)
Dept: FAMILY MEDICINE | Facility: OTHER | Age: 80
End: 2020-11-18

## 2020-11-18 NOTE — TELEPHONE ENCOUNTER
Spoke with patient and .  She is not having any symptoms. No SOB> no dizziness. Feeling pretty good.  noted that her feet are swollen a little, but even on both.    Will have them keep appointments for tomorrow.  Return call with any questions or concerns.      Next 5 appointments (look out 90 days)    Nov 19, 2020  4:00 PM  Office Visit with Heather Barnes PA-C  [facility] ([facility]) [address] [place] [id number] [phone]        Chuckie Hanks, ESN, RN, PHN

## 2020-11-18 NOTE — TELEPHONE ENCOUNTER
Care Coordination referral ordered to look into this for the patient. Please let them know to expect a call from them.    Michele Morton MD  [facility]

## 2020-11-18 NOTE — TELEPHONE ENCOUNTER
LM for the patient to return call to the clinic.   Please transfer to any triage RN.   Responded to [employer] message.   Hannah Mancia RN  October 21, 2020

## 2020-11-19 ENCOUNTER — HOSPITAL ENCOUNTER (OUTPATIENT)
Dept: MRI IMAGING | Facility: CLINIC | Age: 80
Discharge: HOME | End: 2020-11-19
Attending: FAMILY MEDICINE | Admitting: FAMILY MEDICINE
Payer: MEDICARE

## 2020-11-19 ENCOUNTER — OFFICE VISIT (OUTPATIENT)
Dept: FAMILY MEDICINE | Facility: OTHER | Age: 80
End: 2020-11-19
Payer: MEDICARE

## 2020-11-19 ENCOUNTER — PATIENT OUTREACH (OUTPATIENT)
Dept: CARE COORDINATION | Facility: CLINIC | Age: 80
End: 2020-11-19

## 2020-11-19 ENCOUNTER — PATIENT OUTREACH (OUTPATIENT)
Dept: NURSING | Facility: CLINIC | Age: 80
End: 2020-11-19
Payer: MEDICARE

## 2020-11-19 ENCOUNTER — TELEPHONE (OUTPATIENT)
Dept: FAMILY MEDICINE | Facility: OTHER | Age: 80
End: 2020-11-19

## 2020-11-19 VITALS
TEMPERATURE: 97.7 F | HEART RATE: 64 BPM | BODY MASS INDEX: 31.84 KG/M2 | HEIGHT: 64 IN | RESPIRATION RATE: 18 BRPM | WEIGHT: 186.5 LBS | SYSTOLIC BLOOD PRESSURE: 160 MMHG | DIASTOLIC BLOOD PRESSURE: 84 MMHG | OXYGEN SATURATION: 95 %

## 2020-11-19 DIAGNOSIS — I25.10 CORONARY ARTERY DISEASE DUE TO LIPID RICH PLAQUE: ICD-10-CM

## 2020-11-19 DIAGNOSIS — R90.89 ABNORMAL BRAIN MRI: ICD-10-CM

## 2020-11-19 DIAGNOSIS — E78.5 HYPERLIPIDEMIA, UNSPECIFIED HYPERLIPIDEMIA TYPE: ICD-10-CM

## 2020-11-19 DIAGNOSIS — I10 HYPERTENSION, GOAL BELOW 140/90: Primary | ICD-10-CM

## 2020-11-19 DIAGNOSIS — R41.3 MEMORY LOSS: ICD-10-CM

## 2020-11-19 DIAGNOSIS — I67.9 CEREBRAL VASCULAR DISEASE: Primary | ICD-10-CM

## 2020-11-19 DIAGNOSIS — I25.83 CORONARY ARTERY DISEASE DUE TO LIPID RICH PLAQUE: ICD-10-CM

## 2020-11-19 DIAGNOSIS — R29.898 TRANSIENT WEAKNESS OF RIGHT LOWER EXTREMITY: ICD-10-CM

## 2020-11-19 PROCEDURE — 99214 OFFICE O/P EST MOD 30 MIN: CPT | Performed by: PHYSICIAN ASSISTANT

## 2020-11-19 PROCEDURE — 70551 MRI BRAIN STEM W/O DYE: CPT

## 2020-11-19 RX ORDER — ATORVASTATIN CALCIUM 40 MG/1
40 TABLET, FILM COATED ORAL DAILY
Qty: 90 TABLET | Refills: 1 | Status: SHIPPED | OUTPATIENT
Start: 2020-11-19 | End: 2020-12-23

## 2020-11-19 RX ORDER — METOPROLOL SUCCINATE 100 MG/1
100 TABLET, EXTENDED RELEASE ORAL DAILY
Qty: 90 TABLET | Refills: 1 | Status: ON HOLD | OUTPATIENT
Start: 2020-11-19 | End: 2020-12-29

## 2020-11-19 ASSESSMENT — MIFFLIN-ST. JEOR: SCORE: 1292.47

## 2020-11-19 NOTE — PROGRESS NOTES
Clinic Care Coordination Contact  [facility]/Voicemail       Clinical Data: Care Coordinator Outreach  Outreach attempted x 1.  Left message on patient's voicemail with call back information and requested return call.  Plan: Care Coordinator will await a return call. If [facility] does not hear from patient by end of day, will send to [facility] to schedule call for a different date/time.    JAMEEL Mayen  Primary Care Clinic- Social Work Care Coordinator  [facility]  Ph: [phone]  11/19/2020 12:09 PM

## 2020-11-19 NOTE — PROGRESS NOTES
Clinic Care Coordination Contact    Clinic Care Coordination Contact  OUTREACH    Referral Information:  Referral Source: Care Team    Reason for Referral: Resources      Additional pertinent details:  Lifeline/Life alert       Primary Diagnosis: Psychosocial    Chief Complaint   Patient presents with     Clinic Care Coordination - Initial     SW        Universal Utilization: [facility]; [facility]  Clinic Utilization  Difficulty keeping appointments:: No  Compliance Concerns: No  No-Show Concerns: No  No PCP office visit in Past Year: No  Utilization    Last refreshed: 11/19/2020  1:00 PM: Hospital Admissions 0           Last refreshed: 11/19/2020  1:00 PM: ED Visits 0           Last refreshed: 11/19/2020  1:00 PM: No Show Count (past year) 2              Current as of: 11/19/2020  1:00 PM              Clinical Concerns:  Current Medical Concerns:    Patient Active Problem List   Diagnosis     Hypertension, goal below 140/90     Hyperlipidemia, unspecified     Advanced directives, counseling/discussion     CAD (coronary artery disease)     Obesity     CKD (chronic kidney disease) stage 3, GFR 30-59 ml/min     Overactive bladder     CHACHO (obstructive sleep apnea)     Type 2 diabetes mellitus with stage 3 chronic kidney disease, without long-term current use of insulin (H)     Mild neurocognitive disorder         Current Behavioral Concerns: None. Patient was engaged in conversation    Education Provided to patient: Introduced self and role. Discussed [employer].       Health Maintenance Reviewed: Due/Overdue   Health Maintenance Due   Topic Date Due     ZOSTER IMMUNIZATION (1 of 2) 11/18/1989     EYE EXAM  05/20/2020     INFLUENZA VACCINE (1) 09/01/2020       Clinical Pathway: None    Medication Management:  Did not discuss medications during this phone conversation     Functional Status:  Bed or wheelchair confined:: No    Living Situation:       Lifestyle & Psychosocial Needs: [facility] received a  call back from patient. Introduced self and role. Discussed consult reason. Patient confirmed she would like information on [employer]. [facility] provided patient with number to [employer] (Ph: [phone]).        Socioeconomic History     Marital status:      Spouse name: Not on file     Number of children: 1     Years of education: Not on file     Highest education level: Not on file   Occupational History     Occupation: Retired     Tobacco Use     Smoking status: Never Smoker     Smokeless tobacco: Never Used     Tobacco comment: no smokers in household   Substance and Sexual Activity     Alcohol use: Yes     Comment: kvng x1/<1xper month     Drug use: No     Sexual activity: Yes     Partners: Male     Birth control/protection: Surgical     Comment: tubal ligation        Resources and Interventions:  Current Resources:      Community Resources: None    Referrals Placed: None     Goals:       Patient/Caregiver understanding: Yes       Future Appointments              Today Heather Barnes, PACrisC [facility], [facility]    In 6 days Makayla Vargas, PT [facility], [facility]    In 3 weeks NL LAB PMC [facility]          Plan: [facility] offered to enroll patient in Care Coordination. Patient declined, as she felt she wouldn't need any further Care Coordination services. [facility] will make no further outreaches. [facility] explained to patient how to reach CC in the future, if needs arise.     JAMEEL Mayen  Primary Care Clinic- Social Work Care Coordinator  [facility]  Ph: [phone]  11/19/2020 2:47 PM

## 2020-11-19 NOTE — PATIENT INSTRUCTIONS
Increase Metoprolol to 100 mg daily    (use up what you have at home by taking 2)    Continue Losartan the same    Schedule with neurology    Recheck your blood pressure in 2-4 weeks

## 2020-11-19 NOTE — PROGRESS NOTES
"Subjective     Loni Ybarra is a 80 year old female who presents to clinic today for the following health issues:    HPI         Hypertension Follow-up      Do you check your blood pressure regularly outside of the clinic? Yes     Are you following a low salt diet? Yes    Are your blood pressures ever more than 140 on the top number (systolic) OR more   than 90 on the bottom number (diastolic), for example 140/90? Yes    Patient presents today for a blood pressure recheck. She overall is a very poor historian and is here alone today. Her  is waiting in the [facility] [employer]. She reports she overall has been feeling really good. She states taking her medications as prescribed. She reports her  checked her blood pressure at home and stated it was quite high. She thinks it was \"maybe 130\". She had a brain MRI done today after having transient leg weakness on the right side. She reports this has resolved. We did review her MRI showing findings consistent with a recent small infarct. There was also diffuse parenchymal volume loss and small vessel ischemia. Patient denies any headaches, vision changes, chest pain or shortness of breath. She has very mild lower leg swelling but this is unchanged from her baseline. She reports her blood sugars have been well controlled. She does take a full strength aspirin daily.     Review of Systems   Constitutional, HEENT, cardiovascular, pulmonary, GI, , musculoskeletal, neuro, skin, endocrine and psych systems are negative, except as otherwise noted.      Objective    BP (!) 160/84   Pulse 64   Temp 97.7  F (36.5  C) (Temporal)   Resp 18   Ht 1.62 m (5' 3.78\")   Wt 84.6 kg (186 lb 8 oz)   LMP  (LMP Unknown)   SpO2 95%   Breastfeeding No   BMI 32.23 kg/m    Body mass index is 32.23 kg/m .  Physical Exam   GENERAL: healthy, alert and no distress  EYES: Eyes grossly normal to inspection, PERRL and conjunctivae and sclerae normal  HENT: ear canals and TM's normal, nose " and mouth without ulcers or lesions  NECK: no adenopathy, no asymmetry, masses, or scars and thyroid normal to palpation  RESP: lungs clear to auscultation - no rales, rhonchi or wheezes  CV: regular rate and rhythm, normal S1 S2, no S3 or S4, no murmur, click or rub, no peripheral edema and peripheral pulses strong  MS: no gross musculoskeletal defects noted, no edema  SKIN: no suspicious lesions or rashes  NEURO: Normal strength and tone, mentation intact and speech normal  PSYCH: concentration poor, confused at times and affect normal/bright    Results for orders placed or performed during the hospital encounter of 11/19/20 (from the past 24 hour(s))   MR Brain w/o Contrast    Narrative    MRI BRAIN WITHOUT CONTRAST  11/19/2020 1:02 PM    HISTORY:  TIA, initial exam; Neuro deficit(s), subacute; worsening  dementia symptoms, right sided lower extremity weakness now resolved;  Transient weakness of right lower extremity; Memory loss    TECHNIQUE:  Multiplanar, multisequence MRI of the brain without  gadolinium IV contrast material.    COMPARISON:  Scan dated 8/26/2019    FINDINGS:  There is diffuse parenchymal volume loss.  White matter  changes are present in the cerebral hemispheres that are consistent  with small vessel ischemic disease in this age patient. There is a  small area of restricted diffusion in the left subinsular white  matter. This is seen on axial diffusion image 75 of series 3. This is  consistent with a small recent infarct. This measures only about 3 mm  in size. There is a small chronic infarct in the left cerebellum. The  facial structures appear normal. The arteries at the base of the brain  and the dural venous sinuses appear patent.      Impression    IMPRESSION:    1. Small area of restricted diffusion in the left subinsular white  matter. This is consistent with a recent small infarct.  2. There is diffuse parenchymal volume loss.  White matter changes are  present in the cerebral  hemispheres that are consistent with small  vessel ischemic disease in this age patient.  3. Small chronic left cerebellar infarct. This was also present on the  scan from 8/26/2019.      EL MURPHY MD           Assessment & Plan     Loni was seen today for hypertension.    Diagnoses and all orders for this visit:    Hypertension, goal below 140/90  -     metoprolol succinate ER (TOPROL-XL) 100 MG 24 hr tablet; Take 1 tablet (100 mg) by mouth daily    Abnormal brain MRI  -     NEUROLOGY ADULT REFERRAL    Coronary artery disease due to lipid rich plaque    Hyperlipidemia, unspecified hyperlipidemia type    As noted above, patient is a relatively poor historian. Her MRI today did show changes consistent with a recent small infarct. Reviewed with patient that a follow-up with neurology is recommended and referral placed. We discussed the importance of good blood pressure control. Will increase her Metoprolol to 100 mg at this time. DASH diet/really watching her salt intake was encouraged. She will continue her full strength Aspirin today until further discussion with neurology. Reviewed red flag symptoms that should prompt immediate care in the ER. She will otherwise follow-up in 2-3 weeks for a blood pressure follow-up.       The patient indicates understanding of these issues and agrees with the plan.    RUSTY Rey [facility]

## 2020-11-25 ENCOUNTER — HOSPITAL ENCOUNTER (EMERGENCY)
Facility: CLINIC | Age: 80
Discharge: HOME | End: 2020-11-25
Attending: EMERGENCY MEDICINE | Admitting: EMERGENCY MEDICINE
Payer: MEDICARE

## 2020-11-25 ENCOUNTER — APPOINTMENT (OUTPATIENT)
Dept: CT IMAGING | Facility: CLINIC | Age: 80
End: 2020-11-25
Attending: EMERGENCY MEDICINE
Payer: MEDICARE

## 2020-11-25 VITALS
OXYGEN SATURATION: 98 % | RESPIRATION RATE: 18 BRPM | WEIGHT: 187 LBS | SYSTOLIC BLOOD PRESSURE: 156 MMHG | DIASTOLIC BLOOD PRESSURE: 67 MMHG | TEMPERATURE: 98.1 F | BODY MASS INDEX: 32.32 KG/M2 | HEART RATE: 49 BPM

## 2020-11-25 DIAGNOSIS — R41.0 CONFUSION: ICD-10-CM

## 2020-11-25 DIAGNOSIS — R41.3 MEMORY LOSS: ICD-10-CM

## 2020-11-25 DIAGNOSIS — R45.1 AGITATION: ICD-10-CM

## 2020-11-25 DIAGNOSIS — I10 HYPERTENSION, UNSPECIFIED TYPE: ICD-10-CM

## 2020-11-25 DIAGNOSIS — Z86.73 HISTORY OF RECENT STROKE: ICD-10-CM

## 2020-11-25 LAB
ALBUMIN SERPL-MCNC: 3.1 G/DL (ref 3.4–5)
ALBUMIN UR-MCNC: 100 MG/DL
ALP SERPL-CCNC: 94 U/L (ref 40–150)
ALT SERPL W P-5'-P-CCNC: 19 U/L (ref 0–50)
ANION GAP SERPL CALCULATED.3IONS-SCNC: 5 MMOL/L (ref 3–14)
APPEARANCE UR: CLEAR
AST SERPL W P-5'-P-CCNC: 16 U/L (ref 0–45)
BASOPHILS # BLD AUTO: 0 10E9/L (ref 0–0.2)
BASOPHILS NFR BLD AUTO: 0.2 %
BILIRUB SERPL-MCNC: 0.9 MG/DL (ref 0.2–1.3)
BILIRUB UR QL STRIP: NEGATIVE
BUN SERPL-MCNC: 18 MG/DL (ref 7–30)
CALCIUM SERPL-MCNC: 10 MG/DL (ref 8.5–10.1)
CHLORIDE SERPL-SCNC: 111 MMOL/L (ref 94–109)
CO2 SERPL-SCNC: 29 MMOL/L (ref 20–32)
COLOR UR AUTO: YELLOW
CREAT SERPL-MCNC: 1.18 MG/DL (ref 0.52–1.04)
DIFFERENTIAL METHOD BLD: ABNORMAL
EOSINOPHIL NFR BLD AUTO: 1.2 %
ERYTHROCYTE [DISTWIDTH] IN BLOOD BY AUTOMATED COUNT: 13.8 % (ref 10–15)
GFR SERPL CREATININE-BSD FRML MDRD: 43 ML/MIN/{1.73_M2}
GLUCOSE SERPL-MCNC: 145 MG/DL (ref 70–99)
GLUCOSE UR STRIP-MCNC: 50 MG/DL
HCT VFR BLD AUTO: 33.8 % (ref 35–47)
HGB BLD-MCNC: 10.9 G/DL (ref 11.7–15.7)
HGB UR QL STRIP: NEGATIVE
IMM GRANULOCYTES # BLD: 0 10E9/L (ref 0–0.4)
IMM GRANULOCYTES NFR BLD: 0.4 %
KETONES UR STRIP-MCNC: NEGATIVE MG/DL
LEUKOCYTE ESTERASE UR QL STRIP: NEGATIVE
LYMPHOCYTES # BLD AUTO: 1.7 10E9/L (ref 0.8–5.3)
LYMPHOCYTES NFR BLD AUTO: 17.5 %
MAGNESIUM SERPL-MCNC: 1.7 MG/DL (ref 1.6–2.3)
MCH RBC QN AUTO: 30.3 PG (ref 26.5–33)
MCHC RBC AUTO-ENTMCNC: 32.2 G/DL (ref 31.5–36.5)
MCV RBC AUTO: 94 FL (ref 78–100)
MONOCYTES # BLD AUTO: 0.7 10E9/L (ref 0–1.3)
MONOCYTES NFR BLD AUTO: 7.8 %
MUCOUS THREADS #/AREA URNS LPF: PRESENT /LPF
NEUTROPHILS # BLD AUTO: 6.9 10E9/L (ref 1.6–8.3)
NEUTROPHILS NFR BLD AUTO: 72.9 %
NITRATE UR QL: NEGATIVE
NRBC # BLD AUTO: 0 10*3/UL
NRBC BLD AUTO-RTO: 0 /100
NT-PROBNP SERPL-MCNC: 2930 PG/ML (ref 0–1800)
PH UR STRIP: 5 PH (ref 5–7)
PHOSPHATE SERPL-MCNC: 2.2 MG/DL (ref 2.5–4.5)
PLATELET # BLD AUTO: 170 10E9/L (ref 150–450)
POTASSIUM SERPL-SCNC: 3.5 MMOL/L (ref 3.4–5.3)
PROT SERPL-MCNC: 6.3 G/DL (ref 6.8–8.8)
RBC # BLD AUTO: 3.6 10E12/L (ref 3.8–5.2)
RBC #/AREA URNS AUTO: 4 /HPF (ref 0–2)
SODIUM SERPL-SCNC: 145 MMOL/L (ref 133–144)
SOURCE: ABNORMAL
SP GR UR STRIP: 1.02 (ref 1–1.03)
TROPONIN I SERPL-MCNC: <0.015 UG/L (ref 0–0.04)
TSH SERPL DL<=0.005 MIU/L-ACNC: 0.74 MU/L (ref 0.4–4)
UROBILINOGEN UR STRIP-MCNC: 0 MG/DL (ref 0–2)
WBC # BLD AUTO: 9.5 10E9/L (ref 4–11)
WBC #/AREA URNS AUTO: 3 /HPF (ref 0–5)

## 2020-11-25 PROCEDURE — 81001 URINALYSIS AUTO W/SCOPE: CPT | Performed by: EMERGENCY MEDICINE

## 2020-11-25 PROCEDURE — 80053 COMPREHEN METABOLIC PANEL: CPT | Performed by: EMERGENCY MEDICINE

## 2020-11-25 PROCEDURE — 99285 EMERGENCY DEPT VISIT HI MDM: CPT | Mod: 25 | Performed by: EMERGENCY MEDICINE

## 2020-11-25 PROCEDURE — 84443 ASSAY THYROID STIM HORMONE: CPT | Performed by: EMERGENCY MEDICINE

## 2020-11-25 PROCEDURE — 83735 ASSAY OF MAGNESIUM: CPT | Performed by: EMERGENCY MEDICINE

## 2020-11-25 PROCEDURE — 93010 ELECTROCARDIOGRAM REPORT: CPT | Performed by: EMERGENCY MEDICINE

## 2020-11-25 PROCEDURE — 83880 ASSAY OF NATRIURETIC PEPTIDE: CPT | Mod: GZ | Performed by: EMERGENCY MEDICINE

## 2020-11-25 PROCEDURE — 93005 ELECTROCARDIOGRAM TRACING: CPT | Performed by: EMERGENCY MEDICINE

## 2020-11-25 PROCEDURE — 70450 CT HEAD/BRAIN W/O DYE: CPT

## 2020-11-25 PROCEDURE — 84484 ASSAY OF TROPONIN QUANT: CPT | Performed by: EMERGENCY MEDICINE

## 2020-11-25 PROCEDURE — 250N000013 HC RX MED GY IP 250 OP 250 PS 637: Performed by: EMERGENCY MEDICINE

## 2020-11-25 PROCEDURE — 85025 COMPLETE CBC W/AUTO DIFF WBC: CPT | Performed by: EMERGENCY MEDICINE

## 2020-11-25 PROCEDURE — 84100 ASSAY OF PHOSPHORUS: CPT | Performed by: EMERGENCY MEDICINE

## 2020-11-25 RX ORDER — LOSARTAN POTASSIUM 50 MG/1
100 TABLET ORAL ONCE
Status: COMPLETED | OUTPATIENT
Start: 2020-11-25 | End: 2020-11-25

## 2020-11-25 RX ORDER — CLONIDINE HYDROCHLORIDE 0.1 MG/1
0.2 TABLET ORAL ONCE
Status: COMPLETED | OUTPATIENT
Start: 2020-11-25 | End: 2020-11-25

## 2020-11-25 RX ORDER — METOPROLOL SUCCINATE 100 MG/1
100 TABLET, EXTENDED RELEASE ORAL ONCE
Status: COMPLETED | OUTPATIENT
Start: 2020-11-25 | End: 2020-11-25

## 2020-11-25 RX ORDER — IBUPROFEN 200 MG
400 TABLET ORAL EVERY 4 HOURS PRN
Status: ON HOLD | COMMUNITY
End: 2020-12-29

## 2020-11-25 RX ADMIN — CLONIDINE HYDROCHLORIDE 0.2 MG: 0.1 TABLET ORAL at 12:33

## 2020-11-25 RX ADMIN — METOPROLOL SUCCINATE 100 MG: 100 TABLET, EXTENDED RELEASE ORAL at 13:23

## 2020-11-25 RX ADMIN — LOSARTAN POTASSIUM 100 MG: 50 TABLET, FILM COATED ORAL at 13:24

## 2020-11-25 NOTE — DISCHARGE INSTRUCTIONS
I sent a referral for care coordination/.      I will also send a message to Dr. Burgos regarding the ED visit.    Continue to follow-up via [employer].      Be sure to continue blood pressure medications as prescribed.  We did give the morning dose in the ED.     Return at anytime for worsening, changes or concerns.    I hope that you can get some rest and have an enjoyable Thanksgiving and weekend!

## 2020-11-25 NOTE — ED AVS SNAPSHOT
[facility]  [address] [place] [address]  Phone: [phone]  Fax: [phone]                                    Loni Ybarra   MRN: [id number]    Department: [facility]   Date of Visit: 11/25/2020           After Visit Summary Signature Page    I have received my discharge instructions, and my questions have been answered. I have discussed any challenges I see with this plan with the nurse or doctor.    ..........................................................................................................................................  Patient/Patient Representative Signature      ..........................................................................................................................................  Patient Representative Print Name and Relationship to Patient    ..................................................               ................................................  Date                                   Time    ..........................................................................................................................................  Reviewed by Signature/Title    ...................................................              ..............................................  Date                                               Time          22EPIC Rev 08/18

## 2020-11-27 ENCOUNTER — PATIENT OUTREACH (OUTPATIENT)
Dept: CARE COORDINATION | Facility: CLINIC | Age: 80
End: 2020-11-27

## 2020-11-27 ENCOUNTER — MYC MEDICAL ADVICE (OUTPATIENT)
Dept: FAMILY MEDICINE | Facility: CLINIC | Age: 80
End: 2020-11-27

## 2020-11-27 ENCOUNTER — MYC MEDICAL ADVICE (OUTPATIENT)
Dept: FAMILY MEDICINE | Facility: OTHER | Age: 80
End: 2020-11-27

## 2020-11-27 DIAGNOSIS — I10 HYPERTENSION, GOAL BELOW 140/90: ICD-10-CM

## 2020-11-27 DIAGNOSIS — I25.10 CORONARY ARTERY DISEASE DUE TO LIPID RICH PLAQUE: ICD-10-CM

## 2020-11-27 DIAGNOSIS — I25.83 CORONARY ARTERY DISEASE DUE TO LIPID RICH PLAQUE: ICD-10-CM

## 2020-11-27 DIAGNOSIS — I67.9 CEREBRAL VASCULAR DISEASE: Primary | ICD-10-CM

## 2020-11-27 NOTE — LETTER
[facility] CARE COORDINATION  [address]  [place] [address]    November 30, 2020    Loni Ybarra  [address] [address]      Dear Loni,    I am a clinic community health worker who works at [employer]. I have been trying to reach you recently to introduce Clinic Care Coordination and to see if there was anything I could assist you with.  Below is a description of clinic care coordination and how I can further assist you.      The clinic care coordination team is made up of a registered nurse,  and community health worker who understand the health care system. The goal of clinic care coordination is to help you manage your health and improve access to the health care system in the most efficient manner. The team can assist you in meeting your health care goals by providing education, coordinating services, strengthening the communication among your providers and supporting you with any resource needs.    Please feel free to contact the Community Health Worker at [phone] with any questions or concerns. We are focused on providing you with the highest-quality healthcare experience possible and that all starts with you.     Sincerely,     Aishwarya Keyes, MPH  Community Health Worker   Office: [phone]  [facility], [place], [facility], and Critical access hospital  [address], [place] [address]  [facility]  [address] [place] [address]  [facility]  [address] [place] [address]  [email or web address]

## 2020-11-27 NOTE — PROGRESS NOTES
Clinic Care Coordination Contact  [facility]/Voicemail       Clinical Data: CHW Outreach  Outreach attempted x 1.  CHW called patient no answer unable to leave message, mailbox is full..    Plan: CHW will try to reach patient again in 1-2 business days.    Desirae HOLDER Community Health Worker  Clinic Care Coordination  [facility] : [place] [facility]  Phone: [phone]    Reason for Referral: Patient with some likely stroke related dementia and memory issues. She lives with her  who is her sole caregiver as their child is . She has been increasingly more confused, decreased sleep, agitated. Patient's  is looking for help with future goals, care plans, home health care versus memory care unit, etc.

## 2020-11-27 NOTE — PROGRESS NOTES
Clinic Care Coordination Contact  [facility]/Voicemail       Clinical Data: CHW Outreach  Outreach attempted x 2. Left message on patient's  husbands voicemail with call back information and requested return call.    Plan: CHW will try to reach patient again in 1-2 business days.    Desirae HOLDER Community Health Worker  Clinic Care Coordination  [facility] : [facility]  Phone: [phone]

## 2020-11-30 ENCOUNTER — MYC MEDICAL ADVICE (OUTPATIENT)
Dept: FAMILY MEDICINE | Facility: CLINIC | Age: 80
End: 2020-11-30

## 2020-11-30 NOTE — TELEPHONE ENCOUNTER
[employer] message read with no response.  Will close encounter at this time.    ES PritchettN, RN, PHN

## 2020-11-30 NOTE — PROGRESS NOTES
Clinic Care Coordination Contact  [facility]/Voicemail     Clinical Data: CHW Outreach  Outreach attempted x 3.  Left message on patient's  Micah's voicemail with call back information and requested return call.  Plan: CHW will send care coordination introduction letter with CHW contact information and explanation of care coordination services via [employer]. CHW will do no further outreaches at this time.    Aishwarya Keyes, MPH  Community Health Worker   Office: [phone]  [facility], [place], [facility], and [facility]  [address], [place] [address]  [facility]  [address] [place] [address]  [facility]  [address], [place] [address]  [email or web address]    Reason for Referral: Patient with some likely stroke related dementia and memory issues. She lives with her  who is her sole caregiver as their child is . She has been increasingly more confused, decreased sleep, agitated. Patient's  is looking for help with future goals, care plans, home health care versus memory care unit, etc.    Notes:  -Received a referral to schedule you with CC SW to discuss resources

## 2020-12-01 ENCOUNTER — PATIENT OUTREACH (OUTPATIENT)
Dept: NURSING | Facility: CLINIC | Age: 80
End: 2020-12-01
Payer: MEDICARE

## 2020-12-01 ENCOUNTER — TELEPHONE (OUTPATIENT)
Dept: FAMILY MEDICINE | Facility: OTHER | Age: 80
End: 2020-12-01

## 2020-12-01 DIAGNOSIS — F03.90 DEMENTIA WITHOUT BEHAVIORAL DISTURBANCE, UNSPECIFIED DEMENTIA TYPE: Primary | ICD-10-CM

## 2020-12-01 SDOH — ECONOMIC STABILITY: FOOD INSECURITY: WITHIN THE PAST 12 MONTHS, THE FOOD YOU BOUGHT JUST DIDN'T LAST AND YOU DIDN'T HAVE MONEY TO GET MORE.: NEVER TRUE

## 2020-12-01 SDOH — ECONOMIC STABILITY: TRANSPORTATION INSECURITY
IN THE PAST 12 MONTHS, HAS LACK OF TRANSPORTATION KEPT YOU FROM MEETINGS, WORK, OR FROM GETTING THINGS NEEDED FOR DAILY LIVING?: NO

## 2020-12-01 SDOH — ECONOMIC STABILITY: TRANSPORTATION INSECURITY
IN THE PAST 12 MONTHS, HAS THE LACK OF TRANSPORTATION KEPT YOU FROM MEDICAL APPOINTMENTS OR FROM GETTING MEDICATIONS?: NO

## 2020-12-01 SDOH — ECONOMIC STABILITY: FOOD INSECURITY: WITHIN THE PAST 12 MONTHS, YOU WORRIED THAT YOUR FOOD WOULD RUN OUT BEFORE YOU GOT MONEY TO BUY MORE.: NEVER TRUE

## 2020-12-01 SDOH — ECONOMIC STABILITY: INCOME INSECURITY: HOW HARD IS IT FOR YOU TO PAY FOR THE VERY BASICS LIKE FOOD, HOUSING, MEDICAL CARE, AND HEATING?: NOT HARD AT ALL

## 2020-12-01 ASSESSMENT — ACTIVITIES OF DAILY LIVING (ADL)
DEPENDENT_IADLS:: CLEANING;COOKING;LAUNDRY;SHOPPING;MEAL PREPARATION;MEDICATION MANAGEMENT;MONEY MANAGEMENT;TRANSPORTATION

## 2020-12-01 NOTE — LETTER
[facility] CARE COORDINATION  [address]  [place] [address]    December 1, 2020    Loni Ybarra  [address] [address]      Dear Randell,    I am a clinic care coordinator who works with Mackenzie Jerome MD at [facility]. I wanted to thank you for spending the time to talk with me.  Below is a description of clinic care coordination and how I can further assist you.      The clinic care coordination team is made up of a registered nurse,  and community health worker who understand the health care system. The goal of clinic care coordination is to help you manage your health and improve access to the health care system in the most efficient manner. The team can assist you in meeting your health care goals by providing education, coordinating services, strengthening the communication among your providers and supporting you with any resource needs.    Please feel free to contact me at [phone] with any questions or concerns. We are focused on providing you with the highest-quality healthcare experience possible and that all starts with you.     Sincerely,     JAMEEL Mayen  Primary Care Clinic- Social Work Care Coordinator  [facility] [place] [facility] [place]  Ph: [phone]    Enclosed: I have enclosed a copy of the Complex Care Plan. This has helpful information and goals that we have talked about. Please keep this in an easy to access place to use as needed.

## 2020-12-01 NOTE — PROGRESS NOTES
Clinic Care Coordination Contact    Clinic Care Coordination Contact  OUTREACH    Referral Information:  Referral Source: Care Team    Primary Diagnosis: Psychosocial    Chief Complaint   Patient presents with     Clinic Care Coordination - Initial     SW        Universal Utilization: [facility]   Clinic Utilization  Difficulty keeping appointments:: No  Compliance Concerns: No  No-Show Concerns: No  No PCP office visit in Past Year: No  Utilization    Last refreshed: 12/1/2020 11:37 AM: Hospital Admissions 0           Last refreshed: 12/1/2020 11:37 AM: ED Visits 1           Last refreshed: 12/1/2020 11:37 AM: No Show Count (past year) 2              Current as of: 12/1/2020 11:37 AM              Clinical Concerns:  Current Medical Concerns:    Patient Active Problem List   Diagnosis     Hypertension, goal below 140/90     Hyperlipidemia, unspecified     Advanced directives, counseling/discussion     CAD (coronary artery disease)     Obesity     CKD (chronic kidney disease) stage 3, GFR 30-59 ml/min     Overactive bladder     CHACHO (obstructive sleep apnea)     Type 2 diabetes mellitus with stage 3 chronic kidney disease, without long-term current use of insulin (H)     Mild neurocognitive disorder     Current Behavioral Concerns: , sister in law, and brother all state that patient's cognition is declining rapidly. They report several nights of sundowning behavior. They stated that she wanders around the house. Did not report any aggressive behaviors, just confusion and wandering.  Education Provided to patient: Introduced self and role to , sister in law and brother. Discussed private duty home care, Memory Care [facility] on secure unit and LTC Memory Care on a secure unit.    Pain  Pain (GOAL):: No  Health Maintenance Reviewed: Due/Overdue   Health Maintenance Due   Topic Date Due     ZOSTER IMMUNIZATION (1 of 2) 11/18/1989     EYE EXAM  05/20/2020     DIABETIC FOOT EXAM  12/27/2020       Clinical  "Pathway: None    Medication Management:  Patient's  assists with medication management.      Functional Status:  Dependent ADLs:: Independent  Dependent IADLs:: Cleaning, Cooking, Laundry, Shopping, Meal Preparation, Medication Management, Money Management, Transportation  Bed or wheelchair confined:: No  Mobility Status: Independent  Fallen 2 or more times in the past year?: Yes  Any fall with injury in the past year?: No    Living Situation:  Current living arrangement:: I live in a private home with spouse  Type of residence:: Private home - stairs    Lifestyle & Psychosocial Needs: [facility] contacted patient's spouse, Randell, as scheduled. Introduced self and role. Brother, Jomar and sister in law, Sher, were present on the phone as well, per Randell's request. Randell stated that things with patient's cognition are getting significantly worse and she is sundowning pretty bad. He reports that patient started wandering at 4am today and was in the bathroom for two hours. He wasn't sure what she was doing in there, but she came out of the bathroom with no clothes on. She then wandered in to the furnace room and wasn't redirectable at that time. Randell contacted Sher to see if Sher could help redirect her, but patient kept saying \"leave me alone\". Randell then stated she went back to the bathroom and tried to get in the tub, but got stuck with one foot in the tub and she was wedged on the ground. He reports her shouting in pain, so he helped her up. No significant injuries occurred. They described the events that led to her ED visit on 11/25 as well. Several medical questions were asked by Sher Hadley and Randell that were out of [facility]'s scope of practice. [facility] encouraged them to ask their specific medical questions at patient's Neurology visit on 12/15 (will be help via zoom, per Randell). Randell reports that being patient's caregiver is getting increasingly more difficult for him, as patient \"fiddles\" with stuff all hours of the night. " "He said that she is getting up and out of bed several times throughout the night and messes around with blankets. She turns on lights. She isn't able to get back in to bed properly, so he has to assist in positioning her correctly. She also wears a CPAP that she will \"fiddle\" with as well. Once it is off, she can't get it back on correctly, so Randell has to help with that too. Patient has yet to wander out of the house, but it is a concern for the future, as she has made comments about \"going home\".  said that her nutrition is becoming a concern as well. He stated that she will sometime go throughout the day and not eat. He started making her smoothies with extra vitamins in it to help meet some of her nutritional needs. He said that if she does eat a meal, it's usually half or less. Sher asked for infomraiton on Meals on Wheels. [facility] also discussed Mom's Meals as another option. Overall, family and spouse feel that patient's cognition is declining rapidly and they want to know their options to provide her with more care, and support for Randell. Private duty home care, [facility] with secured memory care unit and LTC with secured memory care unit were all discussed in depth. [facility] explained that insurance does not cover any of these types of care for what patient is needing. [employer] figures of cost were provided, but [facility] stated several times that cost is dependent on facility, agency and amount of care needed, so the best way to get these details would be calling options directly. Sher asked \"Who tells us what kind of care we should go with?\". [facility] explained that MD's can recommend things like the need for 24hr care, but no one can tell them how to obtain that recommended amount of care. [facility] stated that it will come down to finances and the overall preference of Randell. Sher requested a home safety evaluation from home care. [facility] will update PCP with this request. [facility] will provide the following resources for Jomar Mejias " and Sher to review: Senior Linkage Line, Medicare website with LTC options near [place] (per Randell's request), [facility] for home care options and Memory Care ALFs in requested areas, Meals on Wheels and Mom's Meals. Randell, Sher and Jomar provided verbal permission for these resources to be emailed to them so they can start looking at them.     Randell: [email or web address]  Sher: [email or web address]  Jomar: [email or web address]    [facility] stressed to Randell that if patient's safety or his safety is at risk to please bring her to the ED d/t safety concerns. Discussed with Randell that placement can happen from the hospital if he does not feel comfortable taking her home. Randell requested a follow up call in one week, as he feels her cognitive situation is declining that rapidly where he needs more frequent follow up from CC.      Social Needs     Financial resource strain: Not hard at all     Food insecurity     Worry: Never true     Inability: Never true     Transportation needs     Medical: No     Non-medical: No     Diet:: Regular  Inadequate nutrition (GOAL):: Yes( states patient skips meals and doesn't eat the best)  Tube Feeding: No  Inadequate activity/exercise (GOAL):: No  Significant changes in sleep pattern (GOAL): No  Transportation means:: Regular car, Family     Moravian or spiritual beliefs that impact treatment:: No  Mental health DX:: No  Mental health management concern (GOAL):: No  Chemical Dependency Status: No Current Concerns  Informal Support system:: Family, Spouse   Socioeconomic History     Marital status:      Spouse name: Not on file     Number of children: 1     Years of education: Not on file     Highest education level: Not on file   Occupational History     Occupation: Retired     Tobacco Use     Smoking status: Never Smoker     Smokeless tobacco: Never Used     Tobacco comment: no smokers in household   Substance and Sexual Activity     Alcohol use: Not  Currently     Comment: none      Drug use: No     Sexual activity: Yes     Partners: Male     Birth control/protection: Surgical     Comment: tubal ligation        Resources and Interventions:  Current Resources:      Community Resources: None  Supplies used at home:: None  Equipment Currently Used at Home: walker, rolling  Employment Status: retired)   )    Advance Care Plan/Directive  Advanced Care Plans/Directives on file:: No    Referrals Placed: Senior Linkage Line, Meals on Wheels, Home Care, Community Resources, SNF     Goals:   Goals        General    Functional (pt-stated)     Notes - Note created  12/1/2020  2:31 PM by Mynor Orlando BSW    Goal Statement: Glendy needs more care at home or placement in a facility  Date Goal set: 12/1/2020  Barriers: Family/Spouse unsure if they would like to pay for home care services vs secured assisted vs secured LTC unit  Strengths: Patient's spouse and family are great advocates; Patient is enrolled in Care Coordination  Date to Achieve By: 2/1/2020  Patient expressed understanding of goal: Yes-   Action steps to achieve this goal:  1.  and family will review resources sent by [facility] on private duty home care and [facility]/LTC options that offer memory care  2.  and family will determine level of care appropriate for patient  3. I will either get overnight support from a home care agency, move in to secured assisted or secured LTC facility based on /family determination         Healthy Eating (pt-stated)     Notes - Note created  12/1/2020  2:27 PM by Mynor Orlando BSW    Goal Statement:  wants patient to have better nutrition  Date Goal set: 12/1/2020  Barriers: Patient doesn't always eat the meals that are prepared for her  Strengths: Patient's  is a great caregiver and advocate; Patient is enrolled in Care Coordination  Date to Achieve By: 1/1/2020  Patient expressed understanding of goal: Yes-   Action steps to achieve this goal:  1.   will continue to offer meals to patient  2.  will continue to make patient smoothies with added vitamins  3.  will contact and sign up for Meals on Wheels or Mom's Meals if he decides to              Patient/Caregiver understanding: Yes- - Randell; Sister in law- Sher; Brother- Jomar    Outreach Frequency: weekly  Future Appointments              In 1 week NL LAB [facility], [facility]          Plan: Patient has been enrolled in Care Coordination. Randell will review resources with family and make determinations on what kind of care they feel that patient should get. Randell will contact one of the meal services provided, if he so chooses. [facility] will send introduction letter and complex care plan via [employer]. [facility] will follow up with patient's spouse, Randell, in one week, per his request.     JAMEEL Mayen  Primary Care Clinic- Social Work Care Coordinator  [facility]  Ph: [phone]  12/1/2020 3:49 PM

## 2020-12-01 NOTE — TELEPHONE ENCOUNTER
Please call patients sister in law, Jan, [phone]. She said that there is a consent to communicate on file,  She would like to know if a referral was placed for Glendy to see a .

## 2020-12-01 NOTE — LETTER
[facility]  Complex Care Plan  About Me:    Patient Name:  Loni Calzada    YOB: 1940  Age:         80 year old   [place] MRN:    [id number] Telephone Information:  Home Phone [phone]   Mobile [phone]       Address:  [address] [address] Email address:  [email or web address]      Emergency Contact(s)    Name Relationship Lgl Grd Work Phone Home Phone Mobile Phone   1. BELEM CALZADA Spouse   [phone]           Primary language:  English     needed? No   [place] [facility]:  [phone] op. 1  Other communication barriers: Glasses, Cognitive impairment  Preferred Method of Communication:  Mail  Current living arrangement: I live in a private home with spouse  Mobility Status/ Medical Equipment: Independent    Health Maintenance  Health Maintenance Reviewed: Due/Overdue   Health Maintenance Due   Topic Date Due     ZOSTER IMMUNIZATION (1 of 2) 11/18/1989     EYE EXAM  05/20/2020     DIABETIC FOOT EXAM  12/27/2020         My Access Plan  Medical Emergency 911   Primary Clinic Line [facility] - [phone]   24 Hour Appointment Line [phone] or  [phone]) (toll-free)   24 Hour Nurse Line [phone] (toll-free)   Preferred Urgent Care [facility], [phone]   Preferred Hospital [facility]  [phone]   Preferred Pharmacy [facility] - A MAIL ORDER [facility]     Behavioral Health Crisis Line The National Suicide Prevention Lifeline at 1-457.868.8578 or 911             My Care Team Members  Patient Care Team       Relationship Specialty Notifications Start End    Mackenzie Jerome MD PCP - General Family Practice  7/22/14     Phone: [phone] Fax: [phone]         [address] [place] [address] [address] [facility] [address]    Mackenzie Jerome MD Assigned PCP   5/29/16     Phone: [phone] Fax: [phone]          [address] [place] [address] [facility] [address]    Mynor Orlando BSW Lead Care Coordinator Primary Care - CC Admissions 12/1/20             My Care Plans  Self Management and Treatment Plan  Goals and (Comments)  Goals        General    Functional (pt-stated)     Notes - Note created  12/1/2020  2:31 PM by Mynor Orlando BSW    Goal Statement: Glendy needs more care at home or placement in a facility  Date Goal set: 12/1/2020  Barriers: Family/Spouse unsure if they would like to pay for home care services vs secured FPC vs secured LTC unit  Strengths: Patient's spouse and family are great advocates; Patient is enrolled in Care Coordination  Date to Achieve By: 2/1/2020  Patient expressed understanding of goal: Yes-   Action steps to achieve this goal:  1.  and family will review resources sent by [facility] on private duty home care and FPC/LTC options that offer memory care  2.  and family will determine level of care appropriate for patient  3. I will either get overnight support from a home care agency, move in to secured [facility] or secured LTC facility based on /family determination         Healthy Eating (pt-stated)     Notes - Note created  12/1/2020  2:27 PM by Mynor Orlando BSW    Goal Statement:  wants patient to have better nutrition  Date Goal set: 12/1/2020  Barriers: Patient doesn't always eat the meals that are prepared for her  Strengths: Patient's  is a great caregiver and advocate; Patient is enrolled in Care Coordination  Date to Achieve By: 1/1/2020  Patient expressed understanding of goal: Yes-   Action steps to achieve this goal:  1.  will continue to offer meals to patient  2.  will continue to make patient smoothies with added vitamins  3.  will contact and sign up for Meals on Wheels or Mom's Meals if he decides to               Action Plans on File:                       Advance Care Plans/Directives Type:        My Medical and  Care Information  Problem List   Patient Active Problem List   Diagnosis     Hypertension, goal below 140/90     Hyperlipidemia, unspecified     Advanced directives, counseling/discussion     CAD (coronary artery disease)     Obesity     CKD (chronic kidney disease) stage 3, GFR 30-59 ml/min     Overactive bladder     CHACHO (obstructive sleep apnea)     Type 2 diabetes mellitus with stage 3 chronic kidney disease, without long-term current use of insulin (H)     Mild neurocognitive disorder      Current Medications and Allergies:  See printed Medication Report.    Care Coordination Start Date: 12/1/2020   Frequency of Care Coordination: weekly   Form Last Updated: 12/01/2020

## 2020-12-01 NOTE — PROGRESS NOTES
"Clinic Care Coordination Contact  Community Health Worker Initial Outreach    CHW Additional Questions  If ED/Hospital discharge, follow-up appointment scheduled as recommended?: N/A  Medication changes made following ED/Hospital discharge?: N/A  MyChart active?: Yes and social factors questionnaire sent via [employer]    Patient accepts CC: Yes, patient's  Micah scheduled for phone assessment with CC SW on  at 1 pm. Patient's  said if he does not answer, to please try calling his landline number at [phone].    CHW received a voicemail from patient's , Randell Ybarra. In the message, patient's  said he is looking for Desirae HOLDER Community Health Worker for [facility]. Patient's  discussed patient is having some fairly serious cognitive issues, is looking for somebody they can work with to get some direction as to where they need to go at this stage. Patient's  asked for a return call.    CHW spoke with patient's  Micah today, consent to communicate on file. CHW told patient's  she received his message, received a referral to schedule with CC SHAJI to discuss resources. Patient's  was accepting of call and asked for soonest appt with CC SHAJI. CHW scheduled phone assessment with CC SW and patient's  Micah.     During the call, patient's  discussed lots of issues - patient has \"severe cognitive disruption.\" CHW asked if patient's  wanted to discuss with a nurse, or be sent to nurse triage. Patient's  said he thinks they are okay from that perspective. CHW told patient's  to call the clinic with non-emergent questions.    Reason for Referral: Patient with some likely stroke related dementia and memory issues. She lives with her  who is her sole caregiver as their child is . She has been increasingly more confused, decreased sleep, agitated. Patient's  is looking for help with future goals, care " plans, home health care versus memory care unit, etc.    Aishwarya Keyes, MPH  Community Health Worker   Office: [phone]  [facility], [place], [facility], and [facility]  [address], [place] [address]  [facility]  [address] [place] [address]  [facility]  [address], [place] [address]  [email or web address]

## 2020-12-02 NOTE — TELEPHONE ENCOUNTER
I think it is reasonable for home safety evaluation, I placed order, although I haven't had visit yet with patient and will need virtual visit as well--not sure if home safety eval can take place before my visit or not.

## 2020-12-02 NOTE — TELEPHONE ENCOUNTER
Would like virtual visit.  I have one person on my schedule for Friday (9:40) that isn't my patient--can that patient moved to someone else's schedule and this patient be put on for virtual instead and?  Otherwise, could do 10:40 that day or could offer Dr. Batista or MOON as they have both been involved in her care.

## 2020-12-02 NOTE — PROGRESS NOTES
"Loni Ybarra is a 80 year old female who is being evaluated via a billable telephone visit.      The patient has been notified of following:     \"This telephone visit will be conducted via a call between you and your physician/provider. We have found that certain health care needs can be provided without the need for a physical exam.  This service lets us provide the care you need with a short phone conversation.  If a prescription is necessary we can send it directly to your pharmacy.  If lab work is needed we can place an order for that and you can then stop by our lab to have the test done at a later time.    Telephone visits are billed at different rates depending on your insurance coverage. During this emergency period, for some insurers they may be billed the same as an in-person visit.  Please reach out to your insurance provider with any questions.    If during the course of the call the physician/provider feels a telephone visit is not appropriate, you will not be charged for this service.\"          Subjective     Loni Ybarra is a 80 year old female who presents via phone visit today for the following health issues:    HPI       Discussed with  as Glendy's dementia has put the family in crisis mode.  Glendy doesn't understand what is happening, Randell sat her down yesterday and discussed the memory problems and all the services that are recommended and Glendy agreed then, but now doesn't remember the conversation.  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  Her cognitive evaluation recommended that she have 24 hour supervision.  He states that she is sundowning and can say things that are hurtful.  He is home with her, home care is coming out to assess further needs,  is involved.  Family is trying to be supportive, has some sister-in-laws that will try to help shower her as patient doesn't realize that " she needs to bathe.    Her BPs had been averaging 150/80s.  But he has not taken it for several days.      The  thought this was more of a check-in phone call and not an actual visit.  I did not talk with Glendy at all.  He will send me blood pressure results next week and if blood pressure is not controlled may need to consider adding amlodipine.    Electronically signed by Mackenzie Jerome MD on 12/4/2020

## 2020-12-03 ENCOUNTER — TELEPHONE (OUTPATIENT)
Dept: FAMILY MEDICINE | Facility: OTHER | Age: 80
End: 2020-12-03

## 2020-12-03 NOTE — TELEPHONE ENCOUNTER
Reason for Call: Request for an order or referral:    Order or referral being requested: [facility] called in stating per the patient's 's request they would like to delay Loni's assessment andchange Loni's home care admission date to December 4th.     Date needed: as soon as possible    Has the patient been seen by the PCP for this problem? YES    Additional comments: Phone to reach [facility] [phone]    Phone number Patient can be reached at:  Home number on file [phone] (home)    Best Time:  any    Can we leave a detailed message on this number?  YES    Call taken on 12/3/2020 at 10:28 AM by Demian Jarvis

## 2020-12-03 NOTE — TELEPHONE ENCOUNTER
Patients sister in law requests a call back [phone].  She said a consent to communicate is on file.   She has further questions regarding the message from below.

## 2020-12-04 ENCOUNTER — VIRTUAL VISIT (OUTPATIENT)
Dept: FAMILY MEDICINE | Facility: OTHER | Age: 80
End: 2020-12-04
Payer: MEDICARE

## 2020-12-04 ENCOUNTER — TELEPHONE (OUTPATIENT)
Dept: FAMILY MEDICINE | Facility: OTHER | Age: 80
End: 2020-12-04

## 2020-12-04 DIAGNOSIS — F01.50 VASCULAR DEMENTIA WITHOUT BEHAVIORAL DISTURBANCE (H): ICD-10-CM

## 2020-12-04 PROCEDURE — 99207 PR NO CHARGE LOS: CPT | Mod: TEL | Performed by: FAMILY MEDICINE

## 2020-12-04 NOTE — TELEPHONE ENCOUNTER
Please call Iovnne from [facility] at [phone]  She would like verbal orders for Skilled nursing 2 times  a week for one week, 1 time a week for 3 weeks and 3 as needed  Home Health Aid for 2 times a week for 4 weeks  OT eval and treat  PT eval and treat  Social work as needed.

## 2020-12-04 NOTE — PROGRESS NOTES
"Subjective     Loni Ybarra is a 80 year old female who presents to clinic today for the following health issues:    HPI         Concern - Being seen for admission for Home Health  Onset: BP issues and cognitive, mobility issues, incontinence.    Patient had stroke about a month ago and since then has had rapid deterioration in her cognition.  It is felt that she has a vascular dementia.  She had been living on her own as she and her  live separately due to business obligations.  They would see each other on weekends.  However she became much more confused and was unable to care safely for herself.  She has since moved in with her  and home care has been started.  She has had had difficulty remembering her medications.  She is wearing adult diapers and does not remember to change these.  Her extended family is involved as well and they are concerned about safety at home.  Sometimes both the patient and her  forget to eat until the evening.  The sister-in-law stated that at one time she was found trying to brush her teeth with bathroom .  Apparently she gets much more confused in the evenings consistent with sundowner syndrome.  She does have an appointment with neurology next week.    Patient is pleasant.  She realizes that she does not remember things.  Patient has no complaints.  She denies having chest pain, headaches, shortness of breath, nausea, vomiting, diarrhea or constipation.  No significant weight loss since diagnosis.  Overall patient is cooperative with cares.      Objective    /64   Pulse 64   Temp 97.9  F (36.6  C) (Temporal)   Resp 20   Ht 1.644 m (5' 4.72\")   Wt 83.9 kg (185 lb)   LMP  (LMP Unknown)   SpO2 99%   BMI 31.05 kg/m    Body mass index is 31.05 kg/m .  Physical Exam   Gen: no apparent distress  NECK: no adenopathy, no asymmetry, no masses  Chest: clear to auscultation without wheeze, rale or rhonchi  Cor: regular rate and rhythm without " murmur  ABDOMEN: soft, nontender, no masses and bowel sounds normal  Ext: warm and dry without edema  Psych: Patient is alert.  Mood is stable.        Assessment & Plan     Vascular dementia without behavioral disturbance (H)  Seems to be worsening after a stroke.  She had been essentially living alone but now is unable to safely do this.  Home care has been involved and I agree that this is appropriate.  Occupational Therapy has done a cognitive assessment and is recommended the patient have 24-hour supervision.  In light of the fact that patient does not always remember to eat, does not appear to be able to change her adult diapers regularly and has been found to be trying to brush her teeth with bathroom  I did inform the sister-in-law that I would file a vulnerable adult with the county for further assessment.  I do agree with neurology consult.  I do agree with continuing home care as well as social work involvement to discuss possible alternative living situations.    Urinary incontinence, unspecified type  Sister-in-law discussed urinary incontinence.  Concern for possible bladder infection.  Attempted to obtain urinalysis but patient was unable to get any urine in the container.  Sister-in-law did show me the adult diaper which had a bad odor as well as blood.  Will empirically treat at this time with Keflex.    - UA reflex to Microscopic and Culture; Future  - cephALEXin (KEFLEX) 500 MG capsule; Take 1 capsule (500 mg) by mouth 3 times daily for 7 days  - *UA reflex to Microscopic and Culture ([place] and [facility] (except [place] and [place]); Future        Mackenzie Jerome MD  [facility]

## 2020-12-04 NOTE — TELEPHONE ENCOUNTER
Not seeing a consent on file for her, only the  is listed.  She has a 9:40 appointment now for today.  Will await auth to talk with her.

## 2020-12-08 ENCOUNTER — OFFICE VISIT (OUTPATIENT)
Dept: FAMILY MEDICINE | Facility: OTHER | Age: 80
End: 2020-12-08
Payer: MEDICARE

## 2020-12-08 ENCOUNTER — PATIENT OUTREACH (OUTPATIENT)
Dept: NURSING | Facility: CLINIC | Age: 80
End: 2020-12-08
Payer: MEDICARE

## 2020-12-08 VITALS
HEART RATE: 64 BPM | DIASTOLIC BLOOD PRESSURE: 64 MMHG | BODY MASS INDEX: 30.82 KG/M2 | RESPIRATION RATE: 20 BRPM | WEIGHT: 185 LBS | SYSTOLIC BLOOD PRESSURE: 132 MMHG | TEMPERATURE: 97.9 F | HEIGHT: 65 IN | OXYGEN SATURATION: 99 %

## 2020-12-08 DIAGNOSIS — F01.50 VASCULAR DEMENTIA WITHOUT BEHAVIORAL DISTURBANCE (H): Primary | ICD-10-CM

## 2020-12-08 DIAGNOSIS — R32 URINARY INCONTINENCE, UNSPECIFIED TYPE: ICD-10-CM

## 2020-12-08 PROBLEM — G31.84 MILD NEUROCOGNITIVE DISORDER: Status: RESOLVED | Noted: 2019-09-10 | Resolved: 2020-12-08

## 2020-12-08 PROCEDURE — 99214 OFFICE O/P EST MOD 30 MIN: CPT | Performed by: FAMILY MEDICINE

## 2020-12-08 RX ORDER — CEPHALEXIN 500 MG/1
500 CAPSULE ORAL 3 TIMES DAILY
Qty: 21 CAPSULE | Refills: 0 | Status: SHIPPED | OUTPATIENT
Start: 2020-12-08 | End: 2020-12-15

## 2020-12-08 ASSESSMENT — MIFFLIN-ST. JEOR: SCORE: 1300.64

## 2020-12-08 NOTE — PROGRESS NOTES
Clinic Care Coordination Contact    Follow Up Progress Note      Assessment: [facility] contacted patient's , Randell. He stated that he is very confused who is all involved in patient's care now. [facility] explained her role to Randell again. Reminded him of the conversation that took place last week. Also reminded him of the resources that were emailed to him, Jomar and Sher, per their request. He stated that patient has [facility] coming in to visit him and a  was sent as well. [facility] explained difference between home care SW and [facility].  stated that he will be finding private duty PCA soon with the help of home care SW.  also asked that [facility] resend the email with resources. [facility] fulfilled this request.     Goals addressed this encounter:   Goals Addressed                 This Visit's Progress       Patient Stated      Functional (pt-stated)   30%     Goal Statement: Glendy needs more care at home or placement in a facility  Date Goal set: 12/1/2020  Barriers: Family/Spouse unsure if they would like to pay for home care services vs secured retirement vs secured LTC unit  Strengths: Patient's spouse and family are great advocates; Patient is enrolled in Care Coordination  Date to Achieve By: 2/1/2020  Patient expressed understanding of goal: Yes-   Action steps to achieve this goal:  1.  and family will review resources sent by [facility] on private duty home care and retirement/LTC options that offer memory care  2.  and family will determine level of care appropriate for patient  3. I will either get overnight support from a home care agency, move in to secured [facility] or secured LTC facility based on /family determination           Healthy Eating (pt-stated)   10%     Goal Statement:  wants patient to have better nutrition  Date Goal set: 12/1/2020  Barriers: Patient doesn't always eat the meals that are prepared for her  Strengths: Patient's  is a great caregiver and  advocate; Patient is enrolled in Care Coordination  Date to Achieve By: 1/1/2020  Patient expressed understanding of goal: Yes-   Action steps to achieve this goal:  1.  will continue to offer meals to patient  2.  will continue to make patient smoothies with added vitamins  3.  will contact and sign up for Meals on Wheels or Mom's Meals if he decides to               Intervention/Education provided during outreach: Open ended questions     Outreach Frequency: weekly    Plan:   Care Coordinator will follow up in 1 month.     JAMEEL Mayen  Primary Care Clinic- Social Work Care Coordinator  [facility] and [place]  Ph: [phone]  12/8/2020 10:41 AM

## 2020-12-09 ENCOUNTER — TELEPHONE (OUTPATIENT)
Dept: FAMILY MEDICINE | Facility: OTHER | Age: 80
End: 2020-12-09

## 2020-12-09 ENCOUNTER — TRANSFERRED RECORDS (OUTPATIENT)
Dept: HEALTH INFORMATION MANAGEMENT | Facility: CLINIC | Age: 80
End: 2020-12-09

## 2020-12-09 NOTE — TELEPHONE ENCOUNTER
Reason for Call:  Other     Detailed comments: Effie calling regarding adult protection report they received. Questions in regarding services she is receiving    Phone Number Patient can be reached at: [facility] [phone]    Best Time: any    Can we leave a detailed message on this number? YES    Call taken on 12/9/2020 at 11:51 AM by Stella Caba

## 2020-12-09 NOTE — TELEPHONE ENCOUNTER
Reason for Call:  Form, our goal is to have forms completed with 72 hours, however, some forms may require a visit or additional information.    Type of letter, form or note:  home care orders    Who is the form from?: Home care    Where did the form come from: form was faxed in    What clinic location was the form placed at?: [facility] - [phone]    Where the form was placed: [place] Box/Folder    What number is listed as a contact on the form?:  [phone]       Additional comments: fax [phone]    Call taken on 12/9/2020 at 8:36 AM by Megan Treviño

## 2020-12-11 DIAGNOSIS — R82.90 NONSPECIFIC FINDING ON EXAMINATION OF URINE: ICD-10-CM

## 2020-12-11 DIAGNOSIS — R32 UNSPECIFIED URINARY INCONTINENCE: Primary | ICD-10-CM

## 2020-12-11 DIAGNOSIS — E83.52 HYPERCALCEMIA: ICD-10-CM

## 2020-12-11 DIAGNOSIS — R32 URINARY INCONTINENCE, UNSPECIFIED TYPE: Primary | ICD-10-CM

## 2020-12-11 DIAGNOSIS — N18.32 STAGE 3B CHRONIC KIDNEY DISEASE (H): ICD-10-CM

## 2020-12-11 DIAGNOSIS — R32 URINARY INCONTINENCE, UNSPECIFIED TYPE: ICD-10-CM

## 2020-12-11 DIAGNOSIS — I10 HYPERTENSION, GOAL BELOW 140/90: ICD-10-CM

## 2020-12-11 LAB
ALBUMIN UR-MCNC: 100 MG/DL
ANION GAP SERPL CALCULATED.3IONS-SCNC: 4 MMOL/L (ref 3–14)
APPEARANCE UR: CLEAR
BACTERIA #/AREA URNS HPF: ABNORMAL /HPF
BILIRUB UR QL STRIP: NEGATIVE
BUN SERPL-MCNC: 18 MG/DL (ref 7–30)
CALCIUM SERPL-MCNC: 10 MG/DL (ref 8.5–10.1)
CAOX CRY #/AREA URNS HPF: ABNORMAL /HPF
CHLORIDE SERPL-SCNC: 110 MMOL/L (ref 94–109)
CO2 SERPL-SCNC: 30 MMOL/L (ref 20–32)
COLOR UR AUTO: YELLOW
CREAT SERPL-MCNC: 0.99 MG/DL (ref 0.52–1.04)
GFR SERPL CREATININE-BSD FRML MDRD: 53 ML/MIN/{1.73_M2}
GLUCOSE SERPL-MCNC: 193 MG/DL (ref 70–99)
GLUCOSE UR STRIP-MCNC: NEGATIVE MG/DL
HGB UR QL STRIP: ABNORMAL
KETONES UR STRIP-MCNC: NEGATIVE MG/DL
LEUKOCYTE ESTERASE UR QL STRIP: ABNORMAL
NITRATE UR QL: NEGATIVE
NON-SQ EPI CELLS #/AREA URNS LPF: ABNORMAL /LPF
PH UR STRIP: 5 PH (ref 5–7)
POTASSIUM SERPL-SCNC: 3.4 MMOL/L (ref 3.4–5.3)
PTH-INTACT SERPL-MCNC: 105 PG/ML (ref 18–80)
RBC #/AREA URNS AUTO: ABNORMAL /HPF
SODIUM SERPL-SCNC: 144 MMOL/L (ref 133–144)
SOURCE: ABNORMAL
SP GR UR STRIP: >1.03 (ref 1–1.03)
UROBILINOGEN UR STRIP-ACNC: 0.2 EU/DL (ref 0.2–1)
WBC #/AREA URNS AUTO: ABNORMAL /HPF

## 2020-12-11 PROCEDURE — 81001 URINALYSIS AUTO W/SCOPE: CPT | Performed by: FAMILY MEDICINE

## 2020-12-11 PROCEDURE — 80048 BASIC METABOLIC PNL TOTAL CA: CPT | Performed by: FAMILY MEDICINE

## 2020-12-11 PROCEDURE — 87086 URINE CULTURE/COLONY COUNT: CPT | Performed by: FAMILY MEDICINE

## 2020-12-11 PROCEDURE — 36415 COLL VENOUS BLD VENIPUNCTURE: CPT | Performed by: FAMILY MEDICINE

## 2020-12-11 PROCEDURE — 83970 ASSAY OF PARATHORMONE: CPT | Performed by: FAMILY MEDICINE

## 2020-12-12 LAB
BACTERIA SPEC CULT: NORMAL
SPECIMEN SOURCE: NORMAL

## 2020-12-14 ENCOUNTER — TELEPHONE (OUTPATIENT)
Dept: FAMILY MEDICINE | Facility: OTHER | Age: 80
End: 2020-12-14

## 2020-12-14 NOTE — TELEPHONE ENCOUNTER
Reason for Call:  Form, our goal is to have forms completed with 72 hours, however, some forms may require a visit or additional information.    Type of letter, form or note:  order request for Home Care    Who is the form from?: Home care    Where did the form come from: form was faxed in form [facility]    What clinic location was the form placed at?: [facility] - [phone]    Where the form was placed: Team B forms box Box/Folder    What number is listed as a contact on the form?: [phone]       Additional comments: Please complete and fax    Call taken on 12/14/2020 at 10:31 AM by Liya Castelan

## 2020-12-14 NOTE — TELEPHONE ENCOUNTER
Reason for Call:  Form, our goal is to have forms completed with 72 hours, however, some forms may require a visit or additional information.    Type of letter, form or note:  medical    Who is the form from?: Home care    Where did the form come from: form was faxed in    What clinic location was the form placed at?: [facility] - [phone]    Where the form was placed: [place] Box/Folder    What number is listed as a contact on the form?: [phone]       Additional comments: Please fill out and fax back to [phone]    Call taken on 12/14/2020 at 11:45 AM by Liya Castelan

## 2020-12-15 ENCOUNTER — PATIENT OUTREACH (OUTPATIENT)
Dept: NURSING | Facility: CLINIC | Age: 80
End: 2020-12-15
Payer: MEDICARE

## 2020-12-15 NOTE — PROGRESS NOTES
"Clinic Care Coordination Contact    Follow Up Progress Note      Assessment: VM received from sister in law, Sher. C2C on file. [facility] contacted Sher back on this date. Sher stated that during last call with everyone, Jomar and Sher were not able to give all the information that was needed for SW to fully understand the situation. Sher reports that patient and patient's  had been living apart for quite some time d/t business obligations. She mentioned that she isn't sure that Randell knows what she is needing. He only recently moved back in with Glendy to assist with her care. Sher explained that Randell is not willing to have patient move in to an group home or LTC facility. He feels it is too expensive. Jomar and Sher have looked in to private duty home care agencies and have hit dead ends and she is feeling like they are at a loss. She stated that she has several concerns with patient's  and his lack of caregiving/inability to provide proper care to patient. She stated that she shared many concerns with patient's PCP at most recent office visit on 12/8. PCP made an adult protection report and an Adult Protection  with [facility] came out on 12/14 for an initial visit. She stated , Randell, was very offended that they seemed to imply that he was being neglectful towards pt. Sher was unable to share any of her concerns with APS SW at that time because Randell was there, but patient apparently had made some comments to the SW that Randell overheard and didn't like. Randell approached patient after SW had left and \"reemed her\" by telling her how upset he was that patient shared some things about him and how she made it seem like she didn't like him. Sher stated that she believes patient hasn't been wearing her CPAP and if she does wear it, it isn't on right. Sher has witnessed her struggling to put it on and noted that she needs hands on assistance with it. Randell does not appear to help her fix it. Sher and Jomar " "installed some grab bars because Randell had not done this. Sher also provided patient with a shower chair so she could safely shower with help. Sher reports trying to assist patient with showering recently, but \"failed miserably\". She reports that the smell coming from patient indicated that she likely had not showered in at least a month, which is concerning since patient is incontinent. When asked, Randell reported that patient is in the bathroom for 2hrs every morning. She stated that he just assumes she is bathing and doesn't actually check. She stated that another time that she was over there, Randell hadn't fed patient all day and it was 4pm. When her and another sister in law picked patient up to take her to an appt, Sher reported that pt had expressed being hungry. Randell admitted that he hadn't fed her because he was taking an online test that he had failed 13 years ago and \"he wanted redemption\". Although Randell makes patient smoothies daily, he reportedly does not make them for her on the weekends. It was also reported that another sister in law was trying to help patient get ready for the day one day. She took patient to the bathroom to brush her teeth and patient had grabbed for bathroom  to use as toothpaste. Unclear if this has happened while Randell has not been looking. Sher is very concerned for patient's well being and safety with Randell being the caregiver for her.     [facility] contacted [facility] , Effie, to share concerns. No answer. [facility] left a VM.    Goals addressed this encounter:   Goals Addressed                 This Visit's Progress       Patient Stated      Functional (pt-stated)   30%     Goal Statement: Glendy needs more care at home or placement in a facility  Date Goal set: 12/1/2020  Barriers: Family/Spouse unsure if they would like to pay for home care services vs secured correction vs secured LTC unit  Strengths: Patient's spouse and family are great advocates; Patient is enrolled " in Care Coordination  Date to Achieve By: 2/1/2020  Patient expressed understanding of goal: Yes-   Action steps to achieve this goal:  1.  and family will review resources sent by [facility] on private duty home care and [facility]/LTC options that offer memory care  2.  and family will determine level of care appropriate for patient  3. I will either get overnight support from a home care agency, move in to secured [facility] or secured LTC facility based on /family determination           Healthy Eating (pt-stated)   10%     Goal Statement:  wants patient to have better nutrition  Date Goal set: 12/1/2020  Barriers: Patient doesn't always eat the meals that are prepared for her  Strengths: Patient's  is a great caregiver and advocate; Patient is enrolled in Care Coordination  Date to Achieve By: 1/1/2020  Patient expressed understanding of goal: Yes-   Action steps to achieve this goal:  1.  will continue to offer meals to patient  2.  will continue to make patient smoothies with added vitamins  3.  will contact and sign up for Meals on Wheels or Mom's Meals if he decides to               Intervention/Education provided during outreach: Open ended questions; Validated feelings     Outreach Frequency: weekly    Plan:   Care Coordinator will follow up with  and Sher in 2 weeks. [facility] will await call back from LEESA GIRARD, Effie Orlando, [facility]  Primary Care Clinic- Social Work Care Coordinator  [facility] and [place]  Ph: [phone]  12/15/2020 4:10 PM

## 2020-12-16 ENCOUNTER — PATIENT OUTREACH (OUTPATIENT)
Dept: CARE COORDINATION | Facility: CLINIC | Age: 80
End: 2020-12-16

## 2020-12-16 NOTE — PROGRESS NOTES
Clinic Care Coordination Contact    [facility] received call back from Effie Hall, Adult Protection  with [facility]. Update provided on conversation from yesterday.     JAMEEL Mayen  Primary Care Clinic- Social Work Care Coordinator  [facility] and [facility]  Ph: [phone]  12/16/2020 10:35 AM

## 2020-12-17 ENCOUNTER — TELEPHONE (OUTPATIENT)
Dept: FAMILY MEDICINE | Facility: OTHER | Age: 80
End: 2020-12-17

## 2020-12-17 ENCOUNTER — PATIENT OUTREACH (OUTPATIENT)
Dept: CARE COORDINATION | Facility: CLINIC | Age: 80
End: 2020-12-17

## 2020-12-17 DIAGNOSIS — Z53.9 DIAGNOSIS NOT YET DEFINED: Primary | ICD-10-CM

## 2020-12-17 PROCEDURE — G0180 MD CERTIFICATION HHA PATIENT: HCPCS | Performed by: FAMILY MEDICINE

## 2020-12-17 NOTE — TELEPHONE ENCOUNTER
Reason for call:  Form  Reason for Call:  Form, our goal is to have forms completed with 72 hours, however, some forms may require a visit or additional information.    Type of letter, form or note:  medical     Who is the form from?: Home care    Where did the form come from: form was faxed in    What clinic location was the form placed at?: [facility] - [phone]    Where the form was placed: team b- [place] Box/Folder    What number is listed as a contact on the form?: [phone]       Additional comments: please sign,date and return    Call taken on 12/17/2020 at 8:50 AM by Joan Grady

## 2020-12-17 NOTE — PROGRESS NOTES
Clinic Care Coordination Contact    [facility] received a voicemail from patient's sister in law, Sher, asking if hospice would be appropriate for patient. [facility] called Sher back and explained that she would not be the to make that determination. [facility] encouraged Sher to speak with Randell about that and then notify MD if that is something that they are interested in pursuing. [facility] was able to explain hospice services and philosophy a little bit. Sher stated that overall, patient just needs to get in to a facility that can provide her with the care that she needs. [facility] updated Sher that she spoke with Effie Hall, Adult Protection  on 12/16. Sher has not heard from her yet. [facility] encouraged Sher to notify Effie of any concerns or situations that occur while Randell is providing care to Glendy.     Mynor Orlando, [facility]  Primary Care Clinic- Social Work Care Coordinator  [facility] and [facility]  Ph: [phone]  12/17/2020 3:55 PM

## 2020-12-21 ENCOUNTER — MYC MEDICAL ADVICE (OUTPATIENT)
Dept: FAMILY MEDICINE | Facility: OTHER | Age: 80
End: 2020-12-21

## 2020-12-21 ENCOUNTER — NURSE TRIAGE (OUTPATIENT)
Dept: NURSING | Facility: CLINIC | Age: 80
End: 2020-12-21

## 2020-12-21 NOTE — TELEPHONE ENCOUNTER
OT Gauri calling.  [phone]    Her BP was   193/98 today.  On cephalexin.,   For UTI , prescribed on the 8th  of Dec, and to be finished on the 15th . But there are 8 pills left.    OT states ,   Reporting she refuses to take the cephalexin, and there   Are 8 pills left. She has apparantly only been takling 1  Per day.    Patient is refusing her medication.     OT asking what to do ?    Please call her .     she reports she will also recheck her BP  Today before she leaves.    Peace Sultana RN on 12/21/2020 at 10:40 AM      Additional Information    Caller has urgent medication question about med that PCP prescribed and triager unable to answer question    Negative: Pharmacy calling with prescription questions and triager unable to answer question    Negative: Prescription not at pharmacy and was prescribed today by PCP    Negative: Request for URGENT new prescription or refill of 'essential' medication (i.e., likelihood of harm to patient if not taken) and triager unable to fill per department policy    Protocols used: MEDICATION QUESTION CALL-A-[place]

## 2020-12-21 NOTE — TELEPHONE ENCOUNTER
Replied via mychart based off of answer from OT questions from same day regarding same question.    Jessica Hughes CMA ([facility])

## 2020-12-22 ENCOUNTER — MYC MEDICAL ADVICE (OUTPATIENT)
Dept: FAMILY MEDICINE | Facility: OTHER | Age: 80
End: 2020-12-22

## 2020-12-22 DIAGNOSIS — I67.9 CEREBRAL VASCULAR DISEASE: ICD-10-CM

## 2020-12-22 NOTE — TELEPHONE ENCOUNTER
Message has been read with no reply. Will close.    Jessica Hughes CMA ([facility])

## 2020-12-23 RX ORDER — ATORVASTATIN CALCIUM 40 MG/1
40 TABLET, FILM COATED ORAL DAILY
Qty: 90 TABLET | Refills: 1 | Status: SHIPPED | OUTPATIENT
Start: 2020-12-23 | End: 2020-12-24

## 2020-12-23 NOTE — TELEPHONE ENCOUNTER
OT therapist called to inquire on what to advise about Glendy's antibiotics.  Glendy had only been getting the med once a day instead of three times a day.  Chart review done. Covering provider had advised to take until gone.  Therapist will discuss with  today. She expects he will still only do one pill a day.    Marilu Lee RN

## 2020-12-24 ENCOUNTER — MYC MEDICAL ADVICE (OUTPATIENT)
Dept: FAMILY MEDICINE | Facility: OTHER | Age: 80
End: 2020-12-24

## 2020-12-24 DIAGNOSIS — I67.9 CEREBRAL VASCULAR DISEASE: ICD-10-CM

## 2020-12-24 RX ORDER — ATORVASTATIN CALCIUM 40 MG/1
40 TABLET, FILM COATED ORAL DAILY
Qty: 90 TABLET | Refills: 1 | Status: ON HOLD | OUTPATIENT
Start: 2020-12-24 | End: 2020-12-29

## 2020-12-24 NOTE — TELEPHONE ENCOUNTER
Prescription approved per [facility] Refill [facility].  Hannah Mancia RN  December 24, 2020

## 2020-12-27 ENCOUNTER — HOSPITAL ENCOUNTER (OUTPATIENT)
Facility: CLINIC | Age: 80
Setting detail: OBSERVATION
Discharge: NURSING, REHAB OR LONG-TERM CARE | End: 2020-12-29
Attending: EMERGENCY MEDICINE | Admitting: EMERGENCY MEDICINE
Payer: MEDICARE

## 2020-12-27 DIAGNOSIS — I82.451 ACUTE DEEP VEIN THROMBOSIS (DVT) OF RIGHT PERONEAL VEIN (H): ICD-10-CM

## 2020-12-27 DIAGNOSIS — I67.9 CEREBRAL VASCULAR DISEASE: ICD-10-CM

## 2020-12-27 DIAGNOSIS — I25.10 CORONARY ARTERY DISEASE DUE TO LIPID RICH PLAQUE: ICD-10-CM

## 2020-12-27 DIAGNOSIS — Z20.828 EXPOSURE TO SARS-ASSOCIATED CORONAVIRUS: ICD-10-CM

## 2020-12-27 DIAGNOSIS — R52 GENERALIZED PAIN: ICD-10-CM

## 2020-12-27 DIAGNOSIS — I25.83 CORONARY ARTERY DISEASE DUE TO LIPID RICH PLAQUE: ICD-10-CM

## 2020-12-27 DIAGNOSIS — R19.7 DIARRHEA, UNSPECIFIED TYPE: ICD-10-CM

## 2020-12-27 DIAGNOSIS — M62.81 GENERALIZED MUSCLE WEAKNESS: ICD-10-CM

## 2020-12-27 DIAGNOSIS — I25.10 CORONARY ARTERY DISEASE, ANGINA PRESENCE UNSPECIFIED, UNSPECIFIED VESSEL OR LESION TYPE, UNSPECIFIED WHETHER NATIVE OR TRANSPLANTED HEART: ICD-10-CM

## 2020-12-27 DIAGNOSIS — I10 HYPERTENSION, GOAL BELOW 140/90: ICD-10-CM

## 2020-12-27 DIAGNOSIS — K05.10 GINGIVITIS: ICD-10-CM

## 2020-12-27 DIAGNOSIS — E56.9 VITAMIN DEFICIENCY: Primary | ICD-10-CM

## 2020-12-27 PROBLEM — Z91.89 AT RISK FOR MALNUTRITION: Status: ACTIVE | Noted: 2020-12-27

## 2020-12-27 PROBLEM — R60.0 LOWER EXTREMITY EDEMA: Status: ACTIVE | Noted: 2020-12-27

## 2020-12-27 LAB
ALBUMIN SERPL-MCNC: 3.1 G/DL (ref 3.4–5)
ALBUMIN UR-MCNC: 30 MG/DL
ALP SERPL-CCNC: 100 U/L (ref 40–150)
ALT SERPL W P-5'-P-CCNC: 18 U/L (ref 0–50)
ANION GAP SERPL CALCULATED.3IONS-SCNC: 5 MMOL/L (ref 3–14)
APPEARANCE UR: CLEAR
AST SERPL W P-5'-P-CCNC: 18 U/L (ref 0–45)
BASOPHILS # BLD AUTO: 0 10E9/L (ref 0–0.2)
BASOPHILS NFR BLD AUTO: 0.1 %
BILIRUB SERPL-MCNC: 0.7 MG/DL (ref 0.2–1.3)
BILIRUB UR QL STRIP: NEGATIVE
BUN SERPL-MCNC: 16 MG/DL (ref 7–30)
C DIFF TOX B STL QL: NEGATIVE
CALCIUM SERPL-MCNC: 10.3 MG/DL (ref 8.5–10.1)
CHLORIDE SERPL-SCNC: 109 MMOL/L (ref 94–109)
CO2 SERPL-SCNC: 29 MMOL/L (ref 20–32)
COLOR UR AUTO: ABNORMAL
CREAT SERPL-MCNC: 1 MG/DL (ref 0.52–1.04)
DIFFERENTIAL METHOD BLD: NORMAL
EOSINOPHIL NFR BLD AUTO: 1.5 %
ERYTHROCYTE [DISTWIDTH] IN BLOOD BY AUTOMATED COUNT: 13.3 % (ref 10–15)
GFR SERPL CREATININE-BSD FRML MDRD: 53 ML/MIN/{1.73_M2}
GLUCOSE BLDC GLUCOMTR-MCNC: 126 MG/DL (ref 70–99)
GLUCOSE SERPL-MCNC: 123 MG/DL (ref 70–99)
GLUCOSE UR STRIP-MCNC: NEGATIVE MG/DL
HBA1C MFR BLD: 6.1 % (ref 0–5.6)
HCT VFR BLD AUTO: 39.8 % (ref 35–47)
HGB BLD-MCNC: 13.2 G/DL (ref 11.7–15.7)
HGB UR QL STRIP: ABNORMAL
IMM GRANULOCYTES # BLD: 0 10E9/L (ref 0–0.4)
IMM GRANULOCYTES NFR BLD: 0.1 %
KETONES UR STRIP-MCNC: 5 MG/DL
LABORATORY COMMENT REPORT: NORMAL
LEUKOCYTE ESTERASE UR QL STRIP: NEGATIVE
LYMPHOCYTES # BLD AUTO: 1.5 10E9/L (ref 0.8–5.3)
LYMPHOCYTES NFR BLD AUTO: 18.8 %
MCH RBC QN AUTO: 30.2 PG (ref 26.5–33)
MCHC RBC AUTO-ENTMCNC: 33.2 G/DL (ref 31.5–36.5)
MCV RBC AUTO: 91 FL (ref 78–100)
MONOCYTES # BLD AUTO: 0.6 10E9/L (ref 0–1.3)
MONOCYTES NFR BLD AUTO: 7.4 %
MUCOUS THREADS #/AREA URNS LPF: PRESENT /LPF
NEUTROPHILS # BLD AUTO: 5.8 10E9/L (ref 1.6–8.3)
NEUTROPHILS NFR BLD AUTO: 72.1 %
NITRATE UR QL: NEGATIVE
NRBC # BLD AUTO: 0 10*3/UL
NRBC BLD AUTO-RTO: 0 /100
NT-PROBNP SERPL-MCNC: 1385 PG/ML (ref 0–1800)
PH UR STRIP: 6 PH (ref 5–7)
PLATELET # BLD AUTO: 183 10E9/L (ref 150–450)
POTASSIUM SERPL-SCNC: 3.8 MMOL/L (ref 3.4–5.3)
PROT SERPL-MCNC: 6.7 G/DL (ref 6.8–8.8)
RBC # BLD AUTO: 4.37 10E12/L (ref 3.8–5.2)
RBC #/AREA URNS AUTO: 2 /HPF (ref 0–2)
SARS-COV-2 RNA SPEC QL NAA+PROBE: NEGATIVE
SODIUM SERPL-SCNC: 143 MMOL/L (ref 133–144)
SOURCE: ABNORMAL
SP GR UR STRIP: 1.01 (ref 1–1.03)
SPECIMEN SOURCE: NORMAL
SPECIMEN SOURCE: NORMAL
TSH SERPL DL<=0.005 MIU/L-ACNC: 1.25 MU/L (ref 0.4–4)
UROBILINOGEN UR STRIP-MCNC: 0 MG/DL (ref 0–2)
WBC # BLD AUTO: 8 10E9/L (ref 4–11)
WBC #/AREA URNS AUTO: 1 /HPF (ref 0–5)

## 2020-12-27 PROCEDURE — 250N000013 HC RX MED GY IP 250 OP 250 PS 637: Mod: GY | Performed by: INTERNAL MEDICINE

## 2020-12-27 PROCEDURE — 87493 C DIFF AMPLIFIED PROBE: CPT | Performed by: EMERGENCY MEDICINE

## 2020-12-27 PROCEDURE — 87635 SARS-COV-2 COVID-19 AMP PRB: CPT | Performed by: EMERGENCY MEDICINE

## 2020-12-27 PROCEDURE — 96374 THER/PROPH/DIAG INJ IV PUSH: CPT | Performed by: EMERGENCY MEDICINE

## 2020-12-27 PROCEDURE — 250N000011 HC RX IP 250 OP 636: Performed by: INTERNAL MEDICINE

## 2020-12-27 PROCEDURE — C9803 HOPD COVID-19 SPEC COLLECT: HCPCS | Performed by: EMERGENCY MEDICINE

## 2020-12-27 PROCEDURE — G0378 HOSPITAL OBSERVATION PER HR: HCPCS

## 2020-12-27 PROCEDURE — 99285 EMERGENCY DEPT VISIT HI MDM: CPT | Performed by: EMERGENCY MEDICINE

## 2020-12-27 PROCEDURE — 99219 PR INITIAL OBSERVATION CARE,LEVEL II: CPT | Performed by: INTERNAL MEDICINE

## 2020-12-27 PROCEDURE — 83036 HEMOGLOBIN GLYCOSYLATED A1C: CPT | Performed by: INTERNAL MEDICINE

## 2020-12-27 PROCEDURE — 999N001017 HC STATISTIC GLUCOSE BY METER IP

## 2020-12-27 PROCEDURE — 84443 ASSAY THYROID STIM HORMONE: CPT | Performed by: INTERNAL MEDICINE

## 2020-12-27 PROCEDURE — 258N000003 HC RX IP 258 OP 636: Performed by: INTERNAL MEDICINE

## 2020-12-27 PROCEDURE — 80053 COMPREHEN METABOLIC PANEL: CPT | Performed by: EMERGENCY MEDICINE

## 2020-12-27 PROCEDURE — 81001 URINALYSIS AUTO W/SCOPE: CPT | Performed by: EMERGENCY MEDICINE

## 2020-12-27 PROCEDURE — 85025 COMPLETE CBC W/AUTO DIFF WBC: CPT | Performed by: EMERGENCY MEDICINE

## 2020-12-27 PROCEDURE — 96375 TX/PRO/DX INJ NEW DRUG ADDON: CPT

## 2020-12-27 PROCEDURE — 83880 ASSAY OF NATRIURETIC PEPTIDE: CPT | Mod: GZ | Performed by: INTERNAL MEDICINE

## 2020-12-27 PROCEDURE — 99285 EMERGENCY DEPT VISIT HI MDM: CPT | Mod: 25 | Performed by: EMERGENCY MEDICINE

## 2020-12-27 RX ORDER — DEXTROSE MONOHYDRATE 25 G/50ML
25-50 INJECTION, SOLUTION INTRAVENOUS
Status: DISCONTINUED | OUTPATIENT
Start: 2020-12-27 | End: 2020-12-29 | Stop reason: HOSPADM

## 2020-12-27 RX ORDER — ACETAMINOPHEN 325 MG/1
650 TABLET ORAL EVERY 4 HOURS PRN
Status: DISCONTINUED | OUTPATIENT
Start: 2020-12-27 | End: 2020-12-29 | Stop reason: HOSPADM

## 2020-12-27 RX ORDER — SODIUM CHLORIDE 9 MG/ML
INJECTION, SOLUTION INTRAVENOUS CONTINUOUS
Status: DISCONTINUED | OUTPATIENT
Start: 2020-12-27 | End: 2020-12-28

## 2020-12-27 RX ORDER — ASPIRIN 325 MG
325 TABLET ORAL DAILY
Status: DISCONTINUED | OUTPATIENT
Start: 2020-12-27 | End: 2020-12-29 | Stop reason: HOSPADM

## 2020-12-27 RX ORDER — LABETALOL HYDROCHLORIDE 5 MG/ML
20 INJECTION, SOLUTION INTRAVENOUS ONCE
Status: COMPLETED | OUTPATIENT
Start: 2020-12-27 | End: 2020-12-27

## 2020-12-27 RX ORDER — ACETAMINOPHEN 650 MG/1
650 SUPPOSITORY RECTAL EVERY 4 HOURS PRN
Status: DISCONTINUED | OUTPATIENT
Start: 2020-12-27 | End: 2020-12-29 | Stop reason: HOSPADM

## 2020-12-27 RX ORDER — HYDRALAZINE HYDROCHLORIDE 20 MG/ML
5 INJECTION INTRAMUSCULAR; INTRAVENOUS EVERY 6 HOURS PRN
Status: DISCONTINUED | OUTPATIENT
Start: 2020-12-27 | End: 2020-12-29

## 2020-12-27 RX ORDER — METOPROLOL SUCCINATE 100 MG/1
100 TABLET, EXTENDED RELEASE ORAL DAILY
Status: DISCONTINUED | OUTPATIENT
Start: 2020-12-28 | End: 2020-12-29

## 2020-12-27 RX ORDER — LOSARTAN POTASSIUM 50 MG/1
100 TABLET ORAL DAILY
Status: DISCONTINUED | OUTPATIENT
Start: 2020-12-28 | End: 2020-12-29 | Stop reason: HOSPADM

## 2020-12-27 RX ORDER — ONDANSETRON 2 MG/ML
4 INJECTION INTRAMUSCULAR; INTRAVENOUS EVERY 6 HOURS PRN
Status: DISCONTINUED | OUTPATIENT
Start: 2020-12-27 | End: 2020-12-29 | Stop reason: HOSPADM

## 2020-12-27 RX ORDER — ONDANSETRON 4 MG/1
4 TABLET, ORALLY DISINTEGRATING ORAL EVERY 6 HOURS PRN
Status: DISCONTINUED | OUTPATIENT
Start: 2020-12-27 | End: 2020-12-29 | Stop reason: HOSPADM

## 2020-12-27 RX ORDER — NICOTINE POLACRILEX 4 MG
15-30 LOZENGE BUCCAL
Status: DISCONTINUED | OUTPATIENT
Start: 2020-12-27 | End: 2020-12-29 | Stop reason: HOSPADM

## 2020-12-27 RX ORDER — ATORVASTATIN CALCIUM 40 MG/1
40 TABLET, FILM COATED ORAL EVERY EVENING
Status: DISCONTINUED | OUTPATIENT
Start: 2020-12-27 | End: 2020-12-29 | Stop reason: HOSPADM

## 2020-12-27 RX ADMIN — HYDRALAZINE HYDROCHLORIDE 5 MG: 20 INJECTION INTRAMUSCULAR; INTRAVENOUS at 19:38

## 2020-12-27 RX ADMIN — ASPIRIN 325 MG ORAL TABLET 325 MG: 325 PILL ORAL at 21:47

## 2020-12-27 RX ADMIN — ATORVASTATIN CALCIUM 40 MG: 40 TABLET, FILM COATED ORAL at 21:47

## 2020-12-27 RX ADMIN — LABETALOL HYDROCHLORIDE 20 MG: 5 INJECTION, SOLUTION INTRAVENOUS at 22:25

## 2020-12-27 RX ADMIN — SODIUM CHLORIDE: 9 INJECTION, SOLUTION INTRAVENOUS at 21:47
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
## 2020-12-28 ENCOUNTER — APPOINTMENT (OUTPATIENT)
Dept: PHYSICAL THERAPY | Facility: CLINIC | Age: 80
End: 2020-12-28
Attending: INTERNAL MEDICINE
Payer: MEDICARE

## 2020-12-28 ENCOUNTER — PATIENT OUTREACH (OUTPATIENT)
Dept: CARE COORDINATION | Facility: CLINIC | Age: 80
End: 2020-12-28

## 2020-12-28 ENCOUNTER — APPOINTMENT (OUTPATIENT)
Dept: CARDIOLOGY | Facility: CLINIC | Age: 80
End: 2020-12-28
Attending: INTERNAL MEDICINE
Payer: MEDICARE

## 2020-12-28 ENCOUNTER — APPOINTMENT (OUTPATIENT)
Dept: OCCUPATIONAL THERAPY | Facility: CLINIC | Age: 80
End: 2020-12-28
Attending: INTERNAL MEDICINE
Payer: MEDICARE

## 2020-12-28 ENCOUNTER — MYC MEDICAL ADVICE (OUTPATIENT)
Dept: FAMILY MEDICINE | Facility: OTHER | Age: 80
End: 2020-12-28

## 2020-12-28 ENCOUNTER — APPOINTMENT (OUTPATIENT)
Dept: ULTRASOUND IMAGING | Facility: CLINIC | Age: 80
End: 2020-12-28
Attending: INTERNAL MEDICINE
Payer: MEDICARE

## 2020-12-28 PROBLEM — I82.409 DVT (DEEP VENOUS THROMBOSIS) (H): Status: ACTIVE | Noted: 2020-12-28

## 2020-12-28 LAB
ANION GAP SERPL CALCULATED.3IONS-SCNC: 7 MMOL/L (ref 3–14)
BASOPHILS # BLD AUTO: 0 10E9/L (ref 0–0.2)
BASOPHILS NFR BLD AUTO: 0.1 %
BUN SERPL-MCNC: 14 MG/DL (ref 7–30)
CALCIUM SERPL-MCNC: 9.9 MG/DL (ref 8.5–10.1)
CHLORIDE SERPL-SCNC: 109 MMOL/L (ref 94–109)
CO2 SERPL-SCNC: 28 MMOL/L (ref 20–32)
CREAT SERPL-MCNC: 0.92 MG/DL (ref 0.52–1.04)
DIFFERENTIAL METHOD BLD: NORMAL
EOSINOPHIL NFR BLD AUTO: 1.8 %
ERYTHROCYTE [DISTWIDTH] IN BLOOD BY AUTOMATED COUNT: 13.3 % (ref 10–15)
GFR SERPL CREATININE-BSD FRML MDRD: 58 ML/MIN/{1.73_M2}
GLUCOSE BLDC GLUCOMTR-MCNC: 125 MG/DL (ref 70–99)
GLUCOSE BLDC GLUCOMTR-MCNC: 131 MG/DL (ref 70–99)
GLUCOSE BLDC GLUCOMTR-MCNC: 163 MG/DL (ref 70–99)
GLUCOSE BLDC GLUCOMTR-MCNC: 186 MG/DL (ref 70–99)
GLUCOSE BLDC GLUCOMTR-MCNC: 191 MG/DL (ref 70–99)
GLUCOSE SERPL-MCNC: 155 MG/DL (ref 70–99)
HCT VFR BLD AUTO: 40.3 % (ref 35–47)
HGB BLD-MCNC: 13.2 G/DL (ref 11.7–15.7)
IMM GRANULOCYTES # BLD: 0 10E9/L (ref 0–0.4)
IMM GRANULOCYTES NFR BLD: 0.1 %
LYMPHOCYTES # BLD AUTO: 1.6 10E9/L (ref 0.8–5.3)
LYMPHOCYTES NFR BLD AUTO: 18.6 %
MAGNESIUM SERPL-MCNC: 1.9 MG/DL (ref 1.6–2.3)
MCH RBC QN AUTO: 30 PG (ref 26.5–33)
MCHC RBC AUTO-ENTMCNC: 32.8 G/DL (ref 31.5–36.5)
MCV RBC AUTO: 92 FL (ref 78–100)
MONOCYTES # BLD AUTO: 0.7 10E9/L (ref 0–1.3)
MONOCYTES NFR BLD AUTO: 8.3 %
NEUTROPHILS # BLD AUTO: 6.3 10E9/L (ref 1.6–8.3)
NEUTROPHILS NFR BLD AUTO: 71.1 %
NRBC # BLD AUTO: 0 10*3/UL
NRBC BLD AUTO-RTO: 0 /100
PHOSPHATE SERPL-MCNC: 2.7 MG/DL (ref 2.5–4.5)
PLATELET # BLD AUTO: 191 10E9/L (ref 150–450)
POTASSIUM SERPL-SCNC: 3.4 MMOL/L (ref 3.4–5.3)
RADIOLOGIST FLAGS: ABNORMAL
RBC # BLD AUTO: 4.4 10E12/L (ref 3.8–5.2)
SODIUM SERPL-SCNC: 144 MMOL/L (ref 133–144)
WBC # BLD AUTO: 8.8 10E9/L (ref 4–11)

## 2020-12-28 PROCEDURE — 80048 BASIC METABOLIC PNL TOTAL CA: CPT | Performed by: INTERNAL MEDICINE

## 2020-12-28 PROCEDURE — 97167 OT EVAL HIGH COMPLEX 60 MIN: CPT | Mod: GO

## 2020-12-28 PROCEDURE — 99207 PR CDG-CODE CATEGORY CHANGED: CPT | Performed by: NURSE PRACTITIONER

## 2020-12-28 PROCEDURE — 99225 PR SUBSEQUENT OBSERVATION CARE,LEVEL II: CPT | Performed by: NURSE PRACTITIONER

## 2020-12-28 PROCEDURE — 999N000111 HC STATISTIC OT IP EVAL DEFER

## 2020-12-28 PROCEDURE — 93306 TTE W/DOPPLER COMPLETE: CPT | Mod: 26 | Performed by: INTERNAL MEDICINE

## 2020-12-28 PROCEDURE — 96376 TX/PRO/DX INJ SAME DRUG ADON: CPT | Mod: 59

## 2020-12-28 PROCEDURE — 85025 COMPLETE CBC W/AUTO DIFF WBC: CPT | Performed by: INTERNAL MEDICINE

## 2020-12-28 PROCEDURE — 97530 THERAPEUTIC ACTIVITIES: CPT | Mod: GP | Performed by: PHYSICAL THERAPIST

## 2020-12-28 PROCEDURE — 97161 PT EVAL LOW COMPLEX 20 MIN: CPT | Mod: GP | Performed by: PHYSICAL THERAPIST

## 2020-12-28 PROCEDURE — 93970 EXTREMITY STUDY: CPT

## 2020-12-28 PROCEDURE — 255N000002 HC RX 255 OP 636: Performed by: INTERNAL MEDICINE

## 2020-12-28 PROCEDURE — 999N001017 HC STATISTIC GLUCOSE BY METER IP

## 2020-12-28 PROCEDURE — G0378 HOSPITAL OBSERVATION PER HR: HCPCS

## 2020-12-28 PROCEDURE — 999N000208 ECHOCARDIOGRAM COMPLETE

## 2020-12-28 PROCEDURE — 96372 THER/PROPH/DIAG INJ SC/IM: CPT | Performed by: INTERNAL MEDICINE

## 2020-12-28 PROCEDURE — 83735 ASSAY OF MAGNESIUM: CPT | Performed by: INTERNAL MEDICINE

## 2020-12-28 PROCEDURE — 84100 ASSAY OF PHOSPHORUS: CPT | Performed by: INTERNAL MEDICINE

## 2020-12-28 PROCEDURE — 36415 COLL VENOUS BLD VENIPUNCTURE: CPT | Performed by: INTERNAL MEDICINE

## 2020-12-28 PROCEDURE — 250N000013 HC RX MED GY IP 250 OP 250 PS 637: Mod: GY | Performed by: INTERNAL MEDICINE

## 2020-12-28 PROCEDURE — 250N000011 HC RX IP 250 OP 636: Performed by: INTERNAL MEDICINE

## 2020-12-28 PROCEDURE — 250N000012 HC RX MED GY IP 250 OP 636 PS 637: Performed by: INTERNAL MEDICINE

## 2020-12-28 PROCEDURE — 258N000003 HC RX IP 258 OP 636: Performed by: INTERNAL MEDICINE

## 2020-12-28 RX ORDER — HYDRALAZINE HYDROCHLORIDE 20 MG/ML
10 INJECTION INTRAMUSCULAR; INTRAVENOUS EVERY 6 HOURS PRN
Status: DISCONTINUED | OUTPATIENT
Start: 2020-12-28 | End: 2020-12-29

## 2020-12-28 RX ADMIN — HYDRALAZINE HYDROCHLORIDE 10 MG: 20 INJECTION INTRAMUSCULAR; INTRAVENOUS at 17:02

## 2020-12-28 RX ADMIN — INSULIN ASPART 1 UNITS: 100 INJECTION, SOLUTION INTRAVENOUS; SUBCUTANEOUS at 16:53

## 2020-12-28 RX ADMIN — LOSARTAN POTASSIUM 100 MG: 50 TABLET, FILM COATED ORAL at 09:26

## 2020-12-28 RX ADMIN — SODIUM CHLORIDE: 9 INJECTION, SOLUTION INTRAVENOUS at 09:40

## 2020-12-28 RX ADMIN — ATORVASTATIN CALCIUM 40 MG: 40 TABLET, FILM COATED ORAL at 19:38

## 2020-12-28 RX ADMIN — METOPROLOL SUCCINATE 100 MG: 100 TABLET, EXTENDED RELEASE ORAL at 09:25

## 2020-12-28 RX ADMIN — ASPIRIN 325 MG ORAL TABLET 325 MG: 325 PILL ORAL at 09:25

## 2020-12-28 RX ADMIN — HYDRALAZINE HYDROCHLORIDE 10 MG: 20 INJECTION INTRAMUSCULAR; INTRAVENOUS at 02:53

## 2020-12-28 RX ADMIN — HUMAN ALBUMIN MICROSPHERES AND PERFLUTREN 4 ML: 10; .22 INJECTION, SOLUTION INTRAVENOUS at 08:47

## 2020-12-28 NOTE — PLAN OF CARE
S-(situation): End of shift note    B-(background): diarrhea, weakness    A-(assessment): BP (!) 188/69 (BP Location: Left arm)   Pulse 61   Temp 97.4  F (36.3  C) (Axillary)   Resp 20   Wt 79.5 kg (175 lb 4.3 oz)   LMP  (LMP Unknown)   SpO2 97%   BMI 29.41 kg/m  . Pt hypertensive.  Given 5 mg hydralazine  before arrival to med surg at 1938. 20 mg Labetalol given at 2225 and 10 mg hydralazine at 0253.  Afebrile. Lung sounds CTA B/L. On RA. Pleasantly confused. A&O to self only. Assist x2 with pivot to BSC.  SCDs in use. No BM through night, C-diff negative.  Skin is intact with scattered scabbing around flank area. IV site asymptomatic.     R-(recommendations): Continue to monitor per care plan.
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
## 2020-12-28 NOTE — ED NOTES
ED Nursing criteria listed below was addressed during verbal handoff:     Abnormal vitals: Yes - BP  Abnormal results: No  Med Reconciliation completed: Yes  Meds given in ED: No  Any Overdue Meds: Yes  Core Measures: No  Isolation: Yes  Special needs: Yes  Skin assessment: No    Observation Patient  Education provided: Yes
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
## 2020-12-28 NOTE — PROGRESS NOTES
Clinic Care Coordination Contact  Ambulatory Care Coordination to Inpatient Care Management   Hand-In Communication    Date:  December 28, 2020  Name: Loni Ybarra is enrolled in Ambulatory Care Coordination program and I am the Lead Care Coordinator.  CC Contact Information: Epic InBasket + phone  Payor Source: Payor: MEDICARE / Plan: MEDICARE / Product Type: Medicare /   Current services in place:     Please see the CC Snaphot and Care Management Flowsheets for specific  details of this Loni Ybarra care plan.   Additional details/specific concerns r/t this admission:    Cognitive Impairment Dx of Vascular dementia. Sister-in-law, Sher, notes a pretty significant decline in cognitive abilities recently.  and Caregiver Concerns Sher has indicated extreme concern over 's ability to properly care for patient at home. Please see my note from 12/15 to review several examples given by Sher. Adult protection is involved.     I will follow this admission in Epic. Please feel free to contact me with questions or for further collaboration in discharge planning.    JAMEEL Mayen  Primary Care Clinic- Social Work Care Coordinator  [facility]  Ph: [phone]  12/28/2020 12:49 PM

## 2020-12-28 NOTE — CONSULTS
"CLINICAL NUTRITION SERVICES - ASSESSMENT NOTE     Nutrition Prescription    RECOMMENDATIONS FOR MDs/PROVIDERS TO ORDER:  None at this time     Malnutrition Status:    Unable to determine     Recommendations already ordered by Registered Dietitian (RD):  Boost Plus BID     Future/Additional Recommendations:  Encourage good po intakes      REASON FOR ASSESSMENT  Loni Ybarra is a/an 80 year old female assessed by the dietitian for Provider Order - At risk for Malnutrition     -Patient is a 80 year old female with PMH CAD, HTN, NIDDM, vascular dementia who presents with family for diarrhea, general weakness and unsafe living conditions. Of note, the patient was recently on Keflex for a UTI.  -Per notes, pt is pleasantly confused and oriented to self   -Spoke with pt's RN this morning, pt not appropriate for phone call d/t mentation.   -Working on Placement   -Diarrhea resolved     NUTRITION HISTORY  Unable to assess po intake PTA d/t pt's dementia. Likely pt was not eating well d/t significant wt loss.   -RN reports that pt did not know how to feed herself, so RN fed her breakfast and ate well.     CURRENT NUTRITION ORDERS  Diet: Regular  Intake/Tolerance: No intakes documented, RN reports pt eating well with assistance      LABS  Recent Labs   Lab Test 12/28/20  0555 12/27/20  0956    143   POTASSIUM 3.4 3.8   CHLORIDE 109 109   CO2 28 29   ANIONGAP 7 5   * 123*   BUN 14 16   CR 0.92 1.00   NANCY 9.9 10.3*   Mg/Phos WNL, C diff negative       MEDICATIONS  Insulin, NS at 75mL/hr (1800 mL fluids), others reviewed      ANTHROPOMETRICS  Height: 164.4 cm (5' 4.72\")  as of 12/8/2020  Most Recent Weight: 79.5 kg (175 lb 4.3 oz)    IBW: 55 kg  BMI: Overweight BMI 25-29.9  Weight History:   Wt Readings from Last 10 Encounters:   12/28/20 79.5 kg (175 lb 4.3 oz)   12/08/20 83.9 kg (185 lb)   11/25/20 84.8 kg (187 lb)   11/19/20 84.6 kg (186 lb 8 oz)   11/12/20 84.8 kg (187 lb)   12/27/19 86.9 kg (191 lb 8 oz) "   09/10/19 88 kg (194 lb)   08/13/19 88.9 kg (196 lb)   06/04/19 90.3 kg (199 lb)   05/25/18 89.8 kg (198 lb)   *Pt with a 12 lbs (6%) wt loss over the past month, this is considered severe wt loss    Dosing Weight: 65 kg (adjsted BW)    ASSESSED NUTRITION NEEDS  Estimated Energy Needs: 5371-1547 kcals/day (25 - 30 kcals/kg)  Justification: Maintenance and Repletion  Estimated Protein Needs: 52-65 grams protein/day (0.8 - 1 grams of pro/kg)  Justification: Maintenance  Estimated Fluid Needs: 8235-2181 mL/day (1 mL/kcal)   Justification: Maintenance    PHYSICAL FINDINGS  Unable to determine due to RD off-site and unable to conduct NFPA      MALNUTRITION  % Intake: Unable to assess  % Weight Loss: > 5% in 1 month (severe)  Subcutaneous Fat Loss: Unable to assess  Muscle Loss: Unable to assess  Fluid Accumulation/Edema: Unable to assess  Malnutrition Diagnosis: Unable to determine due to limited information    NUTRITION DIAGNOSIS  Predicted inadequate nutrient intake related to dementia as evidenced by 6% wt loss over the past month       INTERVENTIONS  Continue diet as ordered and continue assistance with meals   Medical food supplement therapy: Will send Boost BID   Continue to monitor and encourage oral intake     Goals  Patient to consume % of nutritionally adequate meal trays TID, or the equivalent with supplements/snacks.     Monitoring/Evaluation  Progress toward goals will be monitored and evaluated per protocol.    Shaina Desai RD, LD   Clinical Dietitian   [facility]: [phone]

## 2020-12-28 NOTE — PROGRESS NOTES
S-(situation): Patient registered to Observation. Patient arrived to room 266 via cart from.    B-(background): Diarrhea, weakness    A-(assessment): BP (!) 220/70 (BP Location: Left arm)   Pulse 62   Temp 98.4  F (36.9  C) (Axillary)   Resp 20   Wt 79.7 kg (175 lb 11.3 oz)   LMP  (LMP Unknown)   SpO2 95%   BMI 29.49 kg/m  .  pleasantly confused.  [place] to self only. Dry rash around top of brief area. Pivot transfer to [facility].     R-(recommendations): Orders and observation goals reviewed with     Nursing Observation criteria listed below was met:    Skin issues/needs documented:Yes  Isolation needs addressed and Signage up: Yes  Fall Prevention: Education given and documented: Yes  Education Assessment documented:Yes  Education Documented: Yes  OBS video/handout Reviewed & Documented: Yes  Allergies Reviewed: Yes  Medication Reconciliation Complete: No, pt unable to   New medication patient education completed and documented (Possible Side Effects of Common Medications handout): Yes  Home medications if not able to send immediately home with family stored here: NA  Reminder note placed in discharge instructions: No  Patient has discharge needs (If yes, please explain): Yes, memory care

## 2020-12-28 NOTE — PROGRESS NOTES
Notified AURORA Beck at 415 PM regarding radiology result.      Spoke with: Ebony    Orders were not obtained.    Comments: Short segment DVT in the right peroneal veins.

## 2020-12-28 NOTE — CONSULTS
Care Management Initial Consult    General Information  Assessment completed with: Sister in michelle Bowers        Primary Care Provider verified and updated as needed:     Readmission within the last 30 days:        Reason for Consult: discharge planning  Advance Care Planning:    no scanned acp documents       Communication Assessment  Patient's communication style: spoken language (English or Bilingual)    Hearing Difficulty or Deaf: no   Wear Glasses or Blind: no    Cognitive  Cognitive/Neuro/Behavioral: .WDL except  Level of Consciousness: confused  Arousal Level: opens eyes spontaneously  Orientation: disoriented to  Mood/Behavior: cooperative  Best Language: 0 - No aphasia  Speech: clear    Living Environment:   People in home: spouseCris Obrien   Current living Arrangements: house      Able to return to prior arrangements:  To be determined       Family/Social Support:  Care provided by: homecare agency  Provides care for: no one, unable/limited ability to care for self  Marital Status:   , Sibling(s)  Micah        Description of Support System: Supportive, Involved    Support Assessment: Adequate family and caregiver support, Adequate social supports    Current Resources:   Skilled Home Care Services: Skilled Nursing, aide, PT and OT through [facility]   Community Resources:    Equipment currently used at home: walker, rolling, grab bar, tub/shower, shower chair  Supplies currently used at home:      Employment/Financial:  Employment Status:  retired        Financial Concerns: No concerns identified           Lifestyle & Psychosocial Needs:     Social Needs     Financial resource strain: Not hard at all     Food insecurity     Worry: Never true     Inability: Never true     Transportation needs     Medical: No     Non-medical: No     Socioeconomic History     Marital status:      Spouse name: Not on file     Number of children: 1     Years of education: Not on file      "Highest education level: Not on file   Occupational History     Occupation: Retired     Tobacco Use     Smoking status: Never Smoker     Smokeless tobacco: Never Used     Tobacco comment: no smokers in household   Substance and Sexual Activity     Alcohol use: Not Currently     Comment: none      Drug use: No     Sexual activity: Yes     Partners: Male     Birth control/protection: Surgical     Comment: tubal ligation       Functional Status:  Prior to admission patient needed assistance: medication management, showers.  Sister in Sher jennings, states patient  should be getting help with dressing \"she will come out of her room with 3 different shirts on\".               Mental Health Status:  Mental Health Status: No Current Concerns       Chemical Dependency Status:  Chemical Dependency Status: No Current Concerns             Values/Beliefs:  Spiritual, Cultural Beliefs, Bahai Practices, Values that affect care:    Unknown              Additional Information:  Per, sister in lawSher- Patient was  from her , Micah for about 15 years and about 2 months ago, moved back in with him.  Patient has vascular dementia and Sher stated that she has been declining quickly.  Patient's brother- Jomar and Sister in Law- Sher are very concerned that patient's needs are not being met at home and that the  cannot care for her needs.  Sher stated that  has told them the he can take care of her.  They have several stories of patient not being dressed appropriately for the cold, not taking her medications,  not being able to get patient out of her chair for 24 hours this past Friday or Saturday and her sitting in her waste.  Sher stated that a vulnerable adult report has been filed.  Family feels that patient is needing long term care placement.       Patient is a current client of [facility] for RN, PT, OT, and aide services.  A [facility]  was at the home one time.  Home " Care is also concerned and feels that patient should go to long term care.      PT and OT have done assessments.  Provider is calling the  and family, as they are all together at patient's home right now.  Medical and assessment updates will be given and recommendation for long term care will be discussed.      1630- Writer had phone conference with patient's , Randell, Brother Jomar, Daughter in law Carmelita, and Daughter in law, Sher Bowers.  They had spoken with the provider.  They all agree that patient is needing Memory Care placement.  They are most interested in [facility] and tried calling [facility].  Discussed that writer will call Gale in Admissions at [facility] tomorrow morning (as family stated she left work for the day).  Discussed that patient may need to go to a nursing home first and then admit to assisted living, depending on how long it will take the assisted living to admit a patient.  Family then most interested in placement at [facility] (Admissions: [phone] Main Phone: [phone] Fax: [phone]) and [facility] (Phone: [phone] Fax: [phone]).  Referrals to be sent.      Effie Rafael from [facility] [place] has called writer.  There is an [facility] case for this patient.  She is now aware that  is agreeing to placement for patient, as he was not in agreement when she spoke with him last week.       6453- Referrals have been sent to the above SNF facilities for long term care memory care placement.     JAMEEL Chery  [facility]   [phone]

## 2020-12-28 NOTE — ED NOTES
Patient noted to have a wet pad - patient changed prior to transport up to medical / surgical unit.  Soaked with urine, no stool noted.

## 2020-12-28 NOTE — PROGRESS NOTES
12/28/20 1110   Quick Adds   Type of Visit Initial PT Evaluation       Present no   Living Environment   People in home spouse   Current Living Arrangements house   Living Environment Comments per chart review living errangements are not safe, spouse cannot care for patient. Unable to determine stair set up in the home   Self-Care   Usual Activity Tolerance fair   Current Activity Tolerance poor   Regular Exercise No   Equipment Currently Used at Home walker, rolling;grab bar, tub/shower;shower chair   Activity/Exercise/Self-Care Comment unable to determine what patient uses regularly. HHRN for bathing   Disability/Function   Hearing Difficulty or Deaf no   Wear Glasses or Blind no   Concentrating, Remembering or Making Decisions Difficulty yes   Difficulty Communicating no   Difficulty Eating/Swallowing no   Walking or Climbing Stairs Difficulty yes   Walking or Climbing Stairs ambulation difficulty, requires equipment   Dressing/Bathing Difficulty yes   Dressing/Bathing Management HHRN twice weekly for cares   Doing Errands Independently Difficulty (such as shopping) no   Fall history within last six months yes   Change in Functional Status Since Onset of Current Illness/Injury yes  (unable to determine)   General Information   Onset of Illness/Injury or Date of Surgery 12/27/20   Referring Physician Dr. Sanchez   Patient/Family Therapy Goals Statement (PT) Long term care placement   Pertinent History of Current Problem (include personal factors and/or comorbidities that impact the POC) Patient is a 80 year old female, registered OBSERVATION status from the ED, found to have generalized weakness and diarrhea. Patient with a previous medical history of vascular dementia, CVA, DM type 2, CHACHO, CAD, HTN and hyperlipidemia.    Existing Precautions/Restrictions fall   Weight-Bearing Status - LUE full weight-bearing   Weight-Bearing Status - RUE full weight-bearing   Weight-Bearing Status - LLE full  weight-bearing   Weight-Bearing Status - RLE full weight-bearing   General Observations PT orders: eval and treat for discharge recommendations. Activity orders: ambulate with assistance.    Cognition   Orientation Status (Cognition) oriented to;person   Affect/Mental Status (Cognition) anxious;confused   Follows Commands (Cognition) follows one-step commands;50-74% accuracy;initiation impaired   Memory Deficit (Cognition)   (see OT assessment)   Cognitive Status Comments observed impaired problem solving, pathfinding, tangential speech, conflicting story telling and poor recall of situation with  (conflicting with other accounts).    Pain Assessment   Patient Currently in Pain No   Integumentary/Edema   Integumentary/Edema Comments Bilat LEs grade 2+   Posture    Posture Forward head position   Range of Motion (ROM)   ROM Comment limited end ranges bilat UE and LE   Strength   Strength Comments 4/5 bilat UE and LE with poor endurance   Bed Mobility   Bed Mobility scooting/bridging;supine-sit;sit-supine   Scooting/Bridging Tripp (Bed Mobility) supervision;verbal cues   Supine-Sit Tripp (Bed Mobility) supervision;verbal cues   Sit-Supine Tripp (Bed Mobility) supervision;verbal cues   Bed Mobility Limitations cognitive deficits   Impairments Contributing to Impaired Bed Mobility impaired coordination;decreased strength   Assistive Device (Bed Mobility) bed rails   Transfers   Transfers bed-chair transfer;sit-stand transfer   Transfer Safety Concerns Noted decreased step length;decreased proprioception;losing balance backward   Impairments Contributing to Impaired Transfers decreased strength;impaired balance   Bed-Chair Transfer   Bed-Chair Tripp (Transfers) supervision;verbal cues   Assistive Device (Bed-Chair Transfers) walker, front-wheeled   Sit-Stand Transfer   Sit-Stand Tripp (Transfers) supervision;contact guard   Assistive Device (Sit-Stand Transfers) walker,  front-wheeled   Sit/Stand Transfer Comments requires momentum and multiple attempts to safely sequence task   Gait/Stairs (Locomotion)   [place] Level (Gait) supervision;contact guard;verbal cues   Assistive Device (Gait) walker, front-wheeled   Distance in Feet (Required for LE Total Joints) 120'   Pattern (Gait) swing-through   Deviations/Abnormal Patterns (Gait) stride length decreased   Comment (Gait/Stairs) poor environment navigation, poor management with turning contacting the wall x 2. walker at arms length   Balance   Balance Comments no LOB with moblity however moves cautiously and requires a walker for safety, transfers demonstrate increased instability and demonstrates increased anterior and posterior weight shift to rise   Sensory Examination   Sensory Perception WFL   Coordination   Coordination no deficits were identified   Muscle Tone   Muscle Tone no deficits were identified   Clinical Impression   Criteria for Skilled Therapeutic Intervention yes, treatment indicated   PT Diagnosis (PT) deconditioned, impaired balance   Influenced by the following impairments acute illness, decreased activity   Functional limitations due to impairments increased risk of falling, use of walker for safe mobility, fatigue   Clinical Presentation Stable/Uncomplicated   Clinical Presentation Rationale no change in status with activity, medical status   Clinical Decision Making (Complexity) low complexity   Therapy Frequency (PT) 4x/week   Predicted Duration of Therapy Intervention (days/wks) 2-3 days   Planned Therapy Interventions (PT) balance training;strengthening;stair training   Anticipated Equipment Needs at Discharge (PT)   (defer to future assessment once equipment is known)   Risk & Benefits of therapy have been explained care plan/treatment goals reviewed;patient;current/potential barriers reviewed   PT Discharge Planning    PT Discharge Recommendation (DC Rec) Long term care facility;Per plan established  by the PT   PT Rationale for DC Rec Patient able to complete functional mobility with supervision to CGA for safety. Patient with fair strength throughout, able to complete 5 sit to/from stands with significant UE support and trunk momentum. Patient is at an increased risks of falls and decline with return home due to poor safety insight, poor cognition with regards to her care. Patient's home set up is known and level of assistance available is unknown. Given report of lack of assistance, presentation upon arrival and poor safety insight LTC placement is adviced with HHPT to address balance and endurance. patient able to mobilize safely and should a safe plan be established in her home than discharge home is safe wit 24/7 assistance and HHC services.    Total Evaluation Time   Total Evaluation Time (Minutes) 25     Thank you for your referral.  Chelsea Granados, PT, DPT, ATC    [facility]  O: [phone]  E: [email or web address]

## 2020-12-28 NOTE — PROGRESS NOTES
"   12/28/20 1500   Quick Adds   Type of Visit Initial Occupational Therapy Evaluation   Living Environment   Living Environment Comments Poor historian. Pt was unable to answer questions of current living situation or level of need.    General Information   Onset of Illness/Injury or Date of Surgery 12/27/20   Referring Physician Abdelrahman Sanchez MD   Patient/Family Therapy Goal Statement (OT) unable to answer   Additional Occupational Profile Info/Pertinent History of Current Problem Per chart review, \"Patient is a 80 year old female with PMH CAD, HTN, NIDDM, vascular dementia who presents with family for diarrhea, general weakness and unsafe living conditions. Of note, the patient was recently on Keflex for a UTI. Per ED, the patient and her  were living apart for over 15 years. She developed vascular dementia and moved back in with him. However, he does not take good care of her, puts all her meds into a bowl and tells her to take some, she was found sitting in her own filth. The family has been working diligently to get her more care including home RN, PT, OT, HHA and looking for nursing home placement. There are multiple social workers involved in her case as well.\"   Cognitive Status Examination   Orientation Status not oriented to person, place or time   Affect/Mental Status (Cognitive) confused;low arousal/lethargic;flat/blunted affect   Follows Commands unable/difficult to assess   Safety Deficit unable/difficult to assess   Memory Deficit unable/difficult to assess   Attention Deficit severe deficit;focused/sustained attention;perseverates on recent conversation;requires cues/redirection to task   Executive Function Deficit severe deficit   Cognitive Status Comments Attempted SLUMs on this date with inability to answer questions for self orientation, place orientation, or day of the week orientation.  Continuously hypervigilant on her .  She repetitively stated \"he's been having problems... he " "gave up.... he doesn't help much with anything\".  Unable to redirect to task.    Clinical Impression   Criteria for Skilled Therapeutic Interventions Met (OT) yes;meets criteria;skilled treatment is necessary   OT Diagnosis cognitive deficits impacting participation and level of assistance needed for BADLs/IADLs   OT Problem List-Impairments impacting ADL cognition   Assessment of Occupational Performance 5 or more Performance Deficits   Identified Performance Deficits medical decision making, safety, medication management, financial management, dressing, bathing, grooming/hygiene, eating/feeding,social participation.    Planned Therapy Interventions (OT) cognition;ADL retraining   Clinical Decision Making Complexity (OT) high complexity   Therapy Frequency (OT) Daily   Predicted Duration of Therapy 1-2x   Risks and Benefits of Treatment have been explained. Yes   Patient, Family & other staff in agreement with plan of care Yes   Comment-Clinical Impression pt would benefit for additional attempt at assessing functional cognitive baseline.    OT Discharge Planning    OT Discharge Recommendation (DC Rec) Transitional Care Facility   OT Rationale for DC Rec Pt demonstrated significant cognitive deficits (unable to complete SLUMs due to inability to attend even with redirections).  Unable to assess functional abilities due to highly tangential. TCU is recommended for safety and level of assist needed for BADLs.    Total Evaluation Time (Minutes)   Total Evaluation Time (Minutes) 17     Thank you for your referral.     CHRISTIAN Perez  [facility]  Occupational Therapy     Phone: [phone]  Email: [email or web address]    "

## 2020-12-28 NOTE — PROVIDER NOTIFICATION
Patient arrived to floor.  Vitals taken.  /70.  Patient hypertensive in ED.  Given PRN 5mg hydralazine (q6) before arriving to floor.  Per med rec, patient takes Toprol-XL 100mg and losartan 100mg daily.  Pt states she took them this am, and they are ordered for tomorrow.  MD notified.  Order received for 20mg of labetalol once.  Will continue to monitor.

## 2020-12-28 NOTE — PLAN OF CARE
S-(situation): shift note    B-(background): Dementia/increased weakness    A-(assessment): Pt is Alert but confused.  VSS. Afebrile.  Family states dementia has increased.  Unable to feed self, starts to eat but does not finish.  Nurse fed her and was able to eat most of her meal.  Walked to BR from bed with 1 assist.  Denies discomfort.  Had pure wick in during the night but using BR during the day.      R-(recommendations): Will cont to monitor the above.

## 2020-12-29 ENCOUNTER — PATIENT OUTREACH (OUTPATIENT)
Dept: CARE COORDINATION | Facility: CLINIC | Age: 80
End: 2020-12-29

## 2020-12-29 ENCOUNTER — APPOINTMENT (OUTPATIENT)
Dept: PHYSICAL THERAPY | Facility: CLINIC | Age: 80
End: 2020-12-29
Payer: MEDICARE

## 2020-12-29 ENCOUNTER — APPOINTMENT (OUTPATIENT)
Dept: OCCUPATIONAL THERAPY | Facility: CLINIC | Age: 80
End: 2020-12-29
Payer: MEDICARE

## 2020-12-29 VITALS
HEART RATE: 88 BPM | DIASTOLIC BLOOD PRESSURE: 61 MMHG | BODY MASS INDEX: 29.41 KG/M2 | SYSTOLIC BLOOD PRESSURE: 148 MMHG | OXYGEN SATURATION: 97 % | WEIGHT: 175.27 LBS | RESPIRATION RATE: 18 BRPM | TEMPERATURE: 96.3 F

## 2020-12-29 LAB
ANION GAP SERPL CALCULATED.3IONS-SCNC: 5 MMOL/L (ref 3–14)
BUN SERPL-MCNC: 12 MG/DL (ref 7–30)
CALCIUM SERPL-MCNC: 10.5 MG/DL (ref 8.5–10.1)
CHLORIDE SERPL-SCNC: 110 MMOL/L (ref 94–109)
CO2 SERPL-SCNC: 27 MMOL/L (ref 20–32)
CREAT SERPL-MCNC: 0.88 MG/DL (ref 0.52–1.04)
ERYTHROCYTE [DISTWIDTH] IN BLOOD BY AUTOMATED COUNT: 13.6 % (ref 10–15)
GFR SERPL CREATININE-BSD FRML MDRD: 62 ML/MIN/{1.73_M2}
GLUCOSE BLDC GLUCOMTR-MCNC: 116 MG/DL (ref 70–99)
GLUCOSE BLDC GLUCOMTR-MCNC: 119 MG/DL (ref 70–99)
GLUCOSE BLDC GLUCOMTR-MCNC: 212 MG/DL (ref 70–99)
GLUCOSE SERPL-MCNC: 137 MG/DL (ref 70–99)
HCT VFR BLD AUTO: 38.3 % (ref 35–47)
HGB BLD-MCNC: 12.7 G/DL (ref 11.7–15.7)
MCH RBC QN AUTO: 30.4 PG (ref 26.5–33)
MCHC RBC AUTO-ENTMCNC: 33.2 G/DL (ref 31.5–36.5)
MCV RBC AUTO: 92 FL (ref 78–100)
PLATELET # BLD AUTO: 183 10E9/L (ref 150–450)
POTASSIUM SERPL-SCNC: 3.6 MMOL/L (ref 3.4–5.3)
RBC # BLD AUTO: 4.18 10E12/L (ref 3.8–5.2)
SODIUM SERPL-SCNC: 142 MMOL/L (ref 133–144)
WBC # BLD AUTO: 9 10E9/L (ref 4–11)

## 2020-12-29 PROCEDURE — 97530 THERAPEUTIC ACTIVITIES: CPT | Mod: GP | Performed by: PHYSICAL THERAPIST

## 2020-12-29 PROCEDURE — 97535 SELF CARE MNGMENT TRAINING: CPT | Mod: GO,59

## 2020-12-29 PROCEDURE — 999N001017 HC STATISTIC GLUCOSE BY METER IP

## 2020-12-29 PROCEDURE — 250N000011 HC RX IP 250 OP 636: Performed by: INTERNAL MEDICINE

## 2020-12-29 PROCEDURE — 96372 THER/PROPH/DIAG INJ SC/IM: CPT

## 2020-12-29 PROCEDURE — 99217 PR OBSERVATION CARE DISCHARGE: CPT | Performed by: NURSE PRACTITIONER

## 2020-12-29 PROCEDURE — 99207 PR CDG-CODE CATEGORY CHANGED: CPT | Performed by: NURSE PRACTITIONER

## 2020-12-29 PROCEDURE — 250N000013 HC RX MED GY IP 250 OP 250 PS 637: Performed by: INTERNAL MEDICINE

## 2020-12-29 PROCEDURE — 80048 BASIC METABOLIC PNL TOTAL CA: CPT | Performed by: NURSE PRACTITIONER

## 2020-12-29 PROCEDURE — 36415 COLL VENOUS BLD VENIPUNCTURE: CPT | Performed by: NURSE PRACTITIONER

## 2020-12-29 PROCEDURE — G0378 HOSPITAL OBSERVATION PER HR: HCPCS

## 2020-12-29 PROCEDURE — 250N000013 HC RX MED GY IP 250 OP 250 PS 637: Mod: GY | Performed by: PEDIATRICS

## 2020-12-29 PROCEDURE — 85027 COMPLETE CBC AUTOMATED: CPT | Performed by: NURSE PRACTITIONER

## 2020-12-29 PROCEDURE — 96376 TX/PRO/DX INJ SAME DRUG ADON: CPT

## 2020-12-29 PROCEDURE — 97110 THERAPEUTIC EXERCISES: CPT | Mod: GP | Performed by: PHYSICAL THERAPIST

## 2020-12-29 RX ORDER — METOPROLOL SUCCINATE 100 MG/1
200 TABLET, EXTENDED RELEASE ORAL DAILY
Qty: 30 TABLET | Refills: 0 | Status: SHIPPED | OUTPATIENT
Start: 2020-12-29 | End: 2021-01-13

## 2020-12-29 RX ORDER — ASPIRIN 325 MG
325 TABLET ORAL DAILY
Qty: 30 TABLET | Refills: 0 | Status: SHIPPED | OUTPATIENT
Start: 2020-12-29 | End: 2022-01-01

## 2020-12-29 RX ORDER — AMLODIPINE BESYLATE 5 MG/1
5 TABLET ORAL DAILY
Qty: 30 TABLET | Refills: 0 | Status: SHIPPED | OUTPATIENT
Start: 2020-12-30 | End: 2021-01-01

## 2020-12-29 RX ORDER — CHLORHEXIDINE GLUCONATE ORAL RINSE 1.2 MG/ML
15 SOLUTION DENTAL 2 TIMES DAILY
Qty: 118 ML | Refills: 0 | Status: SHIPPED | OUTPATIENT
Start: 2020-12-29 | End: 2021-01-01

## 2020-12-29 RX ORDER — IBUPROFEN 200 MG
400 TABLET ORAL EVERY 4 HOURS PRN
Qty: 30 TABLET | Refills: 0 | Status: SHIPPED | OUTPATIENT
Start: 2020-12-29 | End: 2021-01-07

## 2020-12-29 RX ORDER — ATORVASTATIN CALCIUM 40 MG/1
40 TABLET, FILM COATED ORAL DAILY
Qty: 30 TABLET | Refills: 0 | Status: SHIPPED | OUTPATIENT
Start: 2020-12-29 | End: 2022-01-01

## 2020-12-29 RX ORDER — HYDRALAZINE HYDROCHLORIDE 20 MG/ML
10 INJECTION INTRAMUSCULAR; INTRAVENOUS EVERY 6 HOURS PRN
Status: DISCONTINUED | OUTPATIENT
Start: 2020-12-29 | End: 2020-12-29 | Stop reason: HOSPADM

## 2020-12-29 RX ORDER — LOSARTAN POTASSIUM 100 MG/1
100 TABLET ORAL DAILY
Qty: 30 TABLET | Refills: 0 | Status: SHIPPED | OUTPATIENT
Start: 2020-12-29 | End: 2022-01-01

## 2020-12-29 RX ORDER — METOPROLOL SUCCINATE 100 MG/1
200 TABLET, EXTENDED RELEASE ORAL DAILY
Status: DISCONTINUED | OUTPATIENT
Start: 2020-12-29 | End: 2020-12-29 | Stop reason: HOSPADM

## 2020-12-29 RX ORDER — NITROGLYCERIN 0.4 MG/1
0.4 TABLET SUBLINGUAL EVERY 5 MIN PRN
Qty: 10 TABLET | Refills: 0 | Status: SHIPPED | OUTPATIENT
Start: 2020-12-29 | End: 2022-01-01

## 2020-12-29 RX ORDER — ASCORBATE CALCIUM 500 MG
100 TABLET ORAL DAILY
Qty: 30 TABLET | Refills: 0 | Status: SHIPPED | OUTPATIENT
Start: 2020-12-29 | End: 2022-01-01

## 2020-12-29 RX ORDER — AMLODIPINE BESYLATE 5 MG/1
5 TABLET ORAL DAILY
Status: DISCONTINUED | OUTPATIENT
Start: 2020-12-29 | End: 2020-12-29 | Stop reason: HOSPADM

## 2020-12-29 RX ADMIN — AMLODIPINE BESYLATE 5 MG: 5 TABLET ORAL at 09:17

## 2020-12-29 RX ADMIN — INSULIN ASPART 1 UNITS: 100 INJECTION, SOLUTION INTRAVENOUS; SUBCUTANEOUS at 12:08

## 2020-12-29 RX ADMIN — METOPROLOL SUCCINATE 200 MG: 100 TABLET, EXTENDED RELEASE ORAL at 09:17

## 2020-12-29 RX ADMIN — HYDRALAZINE HYDROCHLORIDE 10 MG: 20 INJECTION INTRAMUSCULAR; INTRAVENOUS at 08:31

## 2020-12-29 RX ADMIN — ASPIRIN 325 MG ORAL TABLET 325 MG: 325 PILL ORAL at 08:31

## 2020-12-29 RX ADMIN — HYDRALAZINE HYDROCHLORIDE 10 MG: 20 INJECTION INTRAMUSCULAR; INTRAVENOUS at 02:24

## 2020-12-29 RX ADMIN — LOSARTAN POTASSIUM 100 MG: 50 TABLET, FILM COATED ORAL at 09:17

## 2020-12-29 NOTE — PROGRESS NOTES
Care Management Follow Up    Length of Stay (days): 0    Expected Discharge Date: 12/29/20     Concerns to be Addressed: home safety, discharge planning       Patient plan of care discussed at interdisciplinary rounds: Yes    Anticipated Discharge Disposition: Long Term Care  Disposition Comments: looking for long term care memory care placement    Anticipated Discharge Services:  Long term care with PT services     Education Provided on the Discharge Plan:  Yes     Patient/Family in Agreement with the Plan: yes    Referrals Placed by CM/SW: Internal Clinic Care Coordination    Private pay costs discussed: Potential long term care costs and potential assisted living costs    Additional Information:   has spoken with Pennie in admissions at [facility].  They do not have a long term care memory care bed available.      Jhonathan at [facility] is currently assessing patient.       has spoken with Gale in admissions at [facility]. They have a memory care opening.  The RN there has to do an assessment to determine if they can meet the patient's needs in their unit.  Gale stated that the [facility] patient may be able to admit if they can accept her would be on Thursday.  She also stated that family would have to move in a bed, etc.  Assessment information is being faxed to [facility] at [facility], fax #[phone], per family request.        Marianne Mcmanus, [facility]   [phone]

## 2020-12-29 NOTE — PROGRESS NOTES
"Clinic Care Coordination Contact    Situation: Patient chart reviewed by care coordinator.    Background: Patient was hospitalized on 12/27/2020 at [facility] for Diarrhea. Also had Generalized weakness     Assessment: SWCC was alerted via CC consult that patient would be discharging to a LTC facility.     Plan/Recommendations: SWCC removed name from care team, resolved episode and changed patient's CC status to \"not a candidate\", per CC workflow. Will do no further follow ups d/t patient being admitted to LTC.    JAMEEL Mayen  Primary Care Clinic- Social Work Care Coordinator  [facility] [place]  Ph: [phone]  12/29/2020 2:38 PM      "

## 2020-12-29 NOTE — PROGRESS NOTES
Pt remains alert and disoriented to place, time, and situation. Pt has been pleasantly confused, very cooperative and generally very happy. Bed alarm remain in place as Pt is forgetful and frequently attempts to get up stating that she needs to use the bathroom, Purwik in place and working well, frequent reorientation provided, New IV placed this shift, PRN hydralazine given for elevated blood pressures, Vitals otherwise stable.   BP (!) 197/76   Pulse 65   Temp 96.2  F (35.7  C) (Oral)   Resp 18   Wt 79.5 kg (175 lb 4.3 oz)   LMP  (LMP Unknown)   SpO2 97%   BMI 29.41 kg/m    Lungs clear, bowel sounds active, Skin is CDI No complaints of pain and or any other complaints.   Will continue to follow plan of care.

## 2020-12-29 NOTE — PROGRESS NOTES
"Pt AO to self, very tearful at start of shift. Pulled out IV and stated \" I never do anything right\".  Purewick put in place overnight. Bed alarm on and ipad to monitor patient's safety.  Blood sugar 163, no sliding scale given.  "

## 2020-12-29 NOTE — PLAN OF CARE
S-(situation): Physical Therapy plan of care    B-(background): Patient is a 80 year old female, registered OBSERVATION status from the ED, found to have generalized weakness and diarrhea. Patient with a previous medical history of vascular dementia, CVA, DM type 2, CHACHO, CAD, HTN and hyperlipidemia.     A-(assessment): Patient discharged to long term care facility.    R-(recommendations): Discharge inpatient PT plan of care and resume skilled PT intervention at placement to improve safety through strengthening and balance training.     Physical Therapy Discharge Summary    Reason for therapy discharge:    Discharged to long term care facility.    Progress towards therapy goal(s). See goals on Care Plan in [facility] electronic health record for goal details.  Goals partially met.  Barriers to achieving goals:   limited tolerance for therapy and discharge from facility.    Therapy recommendation(s):    Continued therapy is recommended.  Rationale/Recommendations:  see above.       Thank you for your referral.  Chelsea Granados, PT, DPT, ATC    [facility]  O: [phone]  E: [email or web address]

## 2020-12-29 NOTE — PLAN OF CARE
Patient alert and pleasantly confused. Patient is on RA. Blood pressure was 190's systolic this morning. Given 10mg Hydralazine. Blood pressure medications also adjusted per Dr. Mo. Otherwise vitally stable. Patient up in chair, needs assistance with eating. Report given to Mary MELARA At 1100.     Danii Burkett RN on 12/29/2020 at 10:57 AM

## 2020-12-29 NOTE — PROGRESS NOTES
Care Management Discharge Note    Discharge Date: 12/29/20       Discharge Disposition: Long Term Care at [facility]  Home    Discharge Services: PT services in long term care       Discharge Transportation: , Randell, and sister in law, Sher, requested van transport.  Randell aware of paying the [employer]  the $50.00 fee by cash or check when  arrives at [facility] around 1:15pm today.     Private pay costs discussed: transportation costs    PAS Confirmation Code:        Education Provided on the Discharge Plan:  Yes     Persons Notified of Discharge Plans: Patient,  Randell, and sister in law, Sher.     Patient/Family in Agreement with the Plan: yes    Handoff Referral Completed: Yes    Additional Information:  Writer spoke with patient's , Randell, over the phone.  Discussed that patient has been accepted for long term care placement in the memory care unit at [facility] for today in a private room a no extra charge.  Discussed Medicare coverage as long as patient is participating and making progress with therapy there.  Discussed that patient is medically stable for discharge today.  Randell in agreement with this discharge plan.  Writer answered his questions.  Also had a conference with Randell and Sher together.  Reviewed the discharge plan and discharge time and they were in agreement.  Sher driving Randell to [place] to sign paperwork at 1pm today.  [place] aware.          JAMEEL Chery  [facility]   [phone]

## 2020-12-29 NOTE — PLAN OF CARE
S-(situation): Patient discharged to [facility] via wheelchair with handy van.    B-(background): weakness    A-(assessment): Patient alert but disoriented x 4. Transfers with 2 assist. Poor diet, needs encouragement to eat.  Last bowel movement: 12/27/2020     R-(recommendations):Message left for Halima at [facility] regarding report. Listed belongings gathered and sent with patient.     Discharge Nursing Criteria:     Care Plan and Patient education resolved: Yes    Vaccines  Influenza status verified at discharge:  Yes    MISC  Home and hospital aquired medications returned to patient: Yes  Medication Bin checked and emptied on discharge Yes  All paperwork sent with patient/Copy of AVS given to patient or family Yes.

## 2020-12-29 NOTE — PROGRESS NOTES
PAS-RR    Per DHS regulation, CTS team completed and submitted PAS-RR to [place] [employer] Direct Connect via the [employer]. CTS team advised SNF and they are aware a PAS-RR has been submitted.     CTS team reviewed with pt or health care agent that they may be contacted for a follow up appointment within 10 days of hospital discharge if SNF stay is <30 days. Contact information for [employer] was also provided.     Pt or health care agent verbalized understanding.     PAS-RR # [id number]    JAMEEL Coffey  [facility] [phone]   [facility]  [phone]

## 2020-12-29 NOTE — DISCHARGE SUMMARY
[facility]  Hospitalist Discharge Summary      Date of Admission:  12/27/2020  Date of Discharge:  12/29/2020  Discharging Provider: Everette Ruelas NP      Discharge Diagnoses   Principal Problem:    Diarrhea of presumed infectious origin  Active Problems:    Hypertension, goal below 140/90    CAD (coronary artery disease)    Type 2 diabetes mellitus with stage 3 chronic kidney disease, without long-term current use of insulin (H)    Generalized muscle weakness    At risk for malnutrition    Lower extremity edema    DVT (deep venous thrombosis) (H)      Follow-ups Needed After Discharge   Follow-up Appointments     Follow Up and recommended labs and tests      Follow up with [facility] physician.  The following labs/tests are   recommended: Ongoing monitoring of blood pressure and titration of   anti-hypertensive medications. Repeat ultrasound of RLE in 5-6 days to   evaluate DVT.             Unresulted Labs Ordered in the Past 30 Days of this Admission     No orders found from 11/27/2020 to 12/28/2020.      These results will be followed up by N/A    Discharge Disposition   Discharged to long-term care facility  Condition at discharge: Stable    Hospital Course   Loni Ybarra is a 80 year old female with PMH CAD, HTN, NIDDM, vascular dementia who presented to the ED 12/27/2020 with family for diarrhea, general weakness and unsafe living conditions. Of note, the patient was recently on Keflex for a UTI. Per ED, the patient and her  were living apart for over 15 years. She developed vascular dementia and moved back in with him. There have been some concerns with patient's care since she returned to live with her . There were questions about medication administration.  The family has been working diligently to get her more care including home RN, PT, OT, HHA and looking for nursing home placement. There are multiple social workers involved in her case as well. Family  reported patient with frequent diarrhea prior to admission. C diff testing was completed and negative. Patient did not have any loose stools while hospitalized. She was admitted to observation status and seen by PT who recommended patient go to LTC facility at discharge. Patient noted to have severe confusion and was unable to complete SLUMS scale secondary to this confusion. Patient was also noted by family to have slight increase in lower extremity edema prior to admission. Ultrasound of bilateral lower extremities done 12/28 showed DVT in the right proximal peroneal veins. Patient denies any pain or discomfort in legs. Given location of clot and risk of bleeding, anticoagulation was not initiated in hospital setting. Recommended follow up ultrasound of RLE within 7 days of hospital discharge to re-evaluate clot. Echocardiogram completed 12/28 showed normal LV systolic function with EF of 60-65% as well as normal right ventricular size and function. BNP and TSH normal. She was seen by RD during admission who could not formally diagnose patient with malnutrition due to lack of information but certainly believed her to be at risk for malnutrition and recommended Boost supplements twice daily. On the day of discharge, patient denies pain. Denies shortness of breath and patient is maintaining oxygen saturations above 90% on room air.  Denies nausea and is tolerating oral intake. Patient is afebrile and hemodynamically stable. Blood pressure remained elevated but is improving with increased dose of metoprolol XL and initiation of 5 mg Norvasc daily. Patient medically stable for discharge to LTC with physical therapy. Prescription written for increased dose of Toprol  mg daily and 5 mg Norvasc daily. Recommended follow up with NH provider for further titration of antihypertensive regimen. Recommended continuing Boost supplements twice daily. Order placed for follow up ultrasound for RLE DVT evaluation within one  week of hospital discharge. Suspect patient weakness largely due to underlying dementia but could be acutely worsened due to malnutrition or medication non-compliance. Patient's other chronic medical issues remained stable and prior to admission medications were continued after discharge.     Consultations This Hospital Stay   CARE MANAGEMENT / SOCIAL WORK IP CONSULT  PHYSICAL THERAPY ADULT IP CONSULT  OCCUPATIONAL THERAPY ADULT IP CONSULT  NUTRITION SERVICES ADULT IP CONSULT  PHYSICAL THERAPY ADULT IP CONSULT    Code Status   Full Code    Time Spent on this Encounter   IEverette NP, personally saw the patient today and spent greater than 30 minutes discharging this patient.       Everette Ruelas NP  [address] MEDICAL SURGICAL  [address] [facility]   [place] [address]  Phone: [phone]  ______________________________________________________________________    Physical Exam   Vital Signs: Temp: 96.3  F (35.7  C) Temp src: Axillary BP: (!) 148/61 Pulse: 88   Resp: 18 SpO2: 97 % O2 Device: None (Room air)    Weight: 175 lbs 4.25 oz  Constitutional: awake, alert, cooperative, no apparent distress, and appears stated age  Respiratory: No increased work of breathing, good air exchange, clear to auscultation bilaterally, no crackles or wheezing  Cardiovascular: regular rate and rhythm and normal S1 and S2  GI: normal bowel sounds, non-distended and non-tender  Skin: normal skin color, texture, turgor  Musculoskeletal: 1+ lower extremity edema noted bilaterally  Neurologic: Awake, alert, oriented consistently only to self; no focal neurologic deficits       Primary Care Physician   Mackenzie Jerome    Discharge Orders      US Lower Extremity Venous Duplex Bilateral     General info for SNF    Length of Stay Estimate: Short Term Care: Estimated # of Days <30  Condition at Discharge: Stable  Level of care:skilled   Rehabilitation Potential: Good  Admission H&P remains valid and up-to-date:  Yes  Recent Chemotherapy: N/A  Use Nursing Home Standing Orders: Yes     Mantoux instructions    Give two-step Mantoux (PPD) Per Facility Policy Yes     Follow Up and recommended labs and tests    Follow up with longterm physician.  The following labs/tests are recommended: Ongoing monitoring of blood pressure and titration of anti-hypertensive medications. Repeat ultrasound of RLE in 5-6 days to evaluate DVT.     Activity - Up with nursing assistance     Reason for your hospital stay    You were in the hospital for generalized weakness and will be transferring to a care facility for ongoing care and physical therapy     Glucose monitor nursing POCT    Before meals and at bedtime     Physical Therapy Adult Consult    Evaluate and treat as clinically indicated.    Reason:  Generalized weakness     Advance Diet as Tolerated    Follow this diet upon discharge: Orders Placed This Encounter      Snacks/Supplements Adult: Boost Plus; Between Meals      Moderate Consistent CHO Diet       Significant Results and Procedures   Most Recent 3 CBC's:  Recent Labs   Lab Test 12/29/20  0552 12/28/20  0555 12/27/20  0956   WBC 9.0 8.8 8.0   HGB 12.7 13.2 13.2   MCV 92 92 91    191 183     Most Recent 3 BMP's:  Recent Labs   Lab Test 12/29/20  0552 12/28/20  0555 12/27/20  0956    144 143   POTASSIUM 3.6 3.4 3.8   CHLORIDE 110* 109 109   CO2 27 28 29   BUN 12 14 16   CR 0.88 0.92 1.00   ANIONGAP 5 7 5   NANCY 10.5* 9.9 10.3*   * 155* 123*   ,   Results for orders placed or performed during the hospital encounter of 12/27/20   US Lower Extremity Venous Duplex Bilateral     Value    Radiologist flags Short segment DVT in the right peroneal veins. (Urgent)    Narrative    VENOUS ULTRASOUND BILATERAL LEG(S)  12/28/2020 10:24 AM     HISTORY: Bilateral lower extremity swelling.    COMPARISON: None.    FINDINGS: Examination of the deep veins with graded compression and  color flow Doppler with spectral wave form  analysis was performed.     Right: Images show nonocclusive deep vein thrombosis in the proximal  right peroneal veins. No deep vein thrombosis in the right femoral  vein, femoral vein, popliteal vein or posterior tibial veins.    Left: Exam is limited secondary to patient requested discontinuation  of compression and augmentation due to pain intolerance. No DVT where  visualized including the common femoral, proximal femoral, profunda  femoral, mid femoral, and posterior tibial veins.      Impression    IMPRESSION:  1. Deep vein thrombosis in the right proximal peroneal veins.  2. No deep vein thrombosis in the left lower extremity; however, exam  is somewhat limited secondary to patient asking to discontinue  compression and augmentation.  3. Lower extremity edema bilaterally.    [Access Center: Short segment DVT in the right peroneal veins.    This report will be copied to the [facility] to ensure a  provider acknowledges the finding. [facility] is available Monday  through Friday 8am-3:30 pm.     ELLYN QUEZADA DO   Echocardiogram Complete    Narrative    [id number]  [id number]  [id number]  [id number]^[place]^HUBER^DOMINIC           [facility]  Echocardiography Laboratory  [address] [place] [address]        Name: PAU CALZADA  MRN: [id number]  : 1940  Study Date: 2020 08:22 AM  Age: 80 yrs  Gender: Female  Patient Location: [facility]  Reason For Study: CHF  History: HTN,CAD,Diabetes,Hyperlipidemia,CKD,Obstructive Sleep Apnea  Ordering Physician: HUBER ZARAGOZA  Referring Physician: Mackenzie Jerome MD  Performed By: Genna García     BSA: 1.8 m2  Height: 64 in  Weight: 175 lb  HR: 70  BP: 188/69 mmHg  _____________________________________________________________________________  __        Procedure  Complete Portable Echo Adult. Optison (NDC #0161-1648) given intravenously.  _____________________________________________________________________________  __         Interpretation Summary     The left ventricle is normal in size.  There is moderate concentric left ventricular hypertrophy.  Left ventricular systolic function is normal.  The visual ejection fraction is estimated at 60-65%.  Mild to moderate valvular aortic stenosis.  There is mild (1+) aortic regurgitation.  Diastolic Doppler findings (E/E' ratio and/or other parameters) suggest left  ventricular filling pressures are increased.  The right ventricle is normal in structure, function and size.  There is no comparison study available.  _____________________________________________________________________________  __        Left Ventricle  The left ventricle is normal in size. There is moderate concentric left  ventricular hypertrophy. Left ventricular systolic function is normal. The  visual ejection fraction is estimated at 60-65%. Grade I or early diastolic  dysfunction. Diastolic Doppler findings (E/E' ratio and/or other parameters)  suggest left ventricular filling pressures are increased. Normal left  ventricular wall motion.     Right Ventricle  The right ventricle is normal in structure, function and size.     Atria  The left atrium is mildly dilated. Right atrial size is normal. There is no  atrial shunt seen.     Mitral Valve  There is mild mitral annular calcification. There is trace mitral  regurgitation.        Tricuspid Valve  The tricuspid valve is normal in structure and function. There is trace  tricuspid regurgitation. Right ventricular systolic pressure could not be  approximated due to inadequate tricuspid regurgitation.     Aortic Valve  There is mild (1+) aortic regurgitation. The calculated aortic valve are is  1.5 cm^2. The peak AoV pressure gradient is 31.0 mmHg. The mean AoV pressure  gradient is 17.9 mmHg. Mild to moderate valvular aortic stenosis.     Pulmonic Valve  The pulmonic valve is not well seen, but is grossly normal. There is trace  pulmonic valvular regurgitation.      Vessels  Normal size aorta. IVC diameter <2.1 cm collapsing >50% with sniff suggests a  normal RA pressure of 3 mmHg.     Pericardium  There is no pericardial effusion.        Rhythm  Sinus rhythm was noted.  _____________________________________________________________________________  __  MMode/2D Measurements & Calculations  IVSd: 1.4 cm     LVIDd: 4.0 cm  LVIDs: 2.7 cm  LVPWd: 1.5 cm  FS: 31.0 %  LV mass(C)d: 210.4 grams  LV mass(C)dI: 113.8 grams/m2  Ao root diam: 3.4 cm  LA dimension: 3.2 cm  asc Aorta Diam: 3.2 cm  LA/Ao: 0.95  LVOT diam: 1.9 cm  LVOT area: 2.9 cm2  LA Volume (BP): 66.3 ml  LA Volume Index (BP): 35.8 ml/m2  RWT: 0.74           Doppler Measurements & Calculations  MV E max jono: 83.9 cm/sec  MV A max jono: 104.3 cm/sec  MV E/A: 0.80  MV dec slope: 340.5 cm/sec2  MV dec time: 0.25 sec  Ao V2 max: 280.7 cm/sec  Ao max P.0 mmHg  Ao V2 mean: 197.4 cm/sec  Ao mean P.9 mmHg  Ao V2 VTI: 61.9 cm  SANTOS(I,D): 1.5 cm2  SANTOS(V,D): 1.4 cm2  LV V1 max P.1 mmHg  LV V1 max: 132.8 cm/sec  LV V1 VTI: 31.5 cm  SV(LVOT): 90.4 ml  SI(LVOT): 48.9 ml/m2  AV Jono Ratio (DI): 0.47  SANTOS Index (cm2/m2): 0.79  E/E' av.4  Lateral E/e': 17.4  Medial E/e': 19.4              _____________________________________________________________________________  __        Report approved by: Duane Winkler 2020 09:34 AM            Discharge Medications   Current Discharge Medication List      START taking these medications    Details   amLODIPine (NORVASC) 5 MG tablet Take 1 tablet (5 mg) by mouth daily  Qty: 30 tablet, Refills: 0    Associated Diagnoses: Hypertension, goal below 140/90         CONTINUE these medications which have CHANGED    Details   aspirin (ASA) 325 MG tablet Take 1 tablet (325 mg) by mouth daily  Qty: 30 tablet, Refills: 0    Associated Diagnoses: Coronary artery disease, angina presence unspecified, unspecified vessel or lesion type, unspecified whether native or transplanted heart       atorvastatin (LIPITOR) 40 MG tablet Take 1 tablet (40 mg) by mouth daily  Qty: 30 tablet, Refills: 0    Associated Diagnoses: Cerebral vascular disease      Bioflavonoid Products (VITAMIN C) CHEW Take 1 chew tab by mouth daily  Qty: 30 tablet, Refills: 0    Associated Diagnoses: Vitamin deficiency      chlorhexidine (PERIDEX) 0.12 % solution Swish and spit 15 mLs in mouth 2 times daily  Qty: 118 mL, Refills: 0    Associated Diagnoses: Gingivitis      ibuprofen (ADVIL/MOTRIN) 200 MG tablet Take 2 tablets (400 mg) by mouth every 4 hours as needed for mild pain  Qty: 30 tablet, Refills: 0    Associated Diagnoses: Generalized pain      losartan (COZAAR) 100 MG tablet Take 1 tablet (100 mg) by mouth daily  Qty: 30 tablet, Refills: 0    Associated Diagnoses: Hypertension, goal below 140/90      metoprolol succinate ER (TOPROL-XL) 100 MG 24 hr tablet Take 2 tablets (200 mg) by mouth daily  Qty: 30 tablet, Refills: 0    Associated Diagnoses: Hypertension, goal below 140/90      nitroGLYcerin (NITROSTAT) 0.4 MG sublingual tablet Place 1 tablet (0.4 mg) under the tongue every 5 minutes as needed for chest pain  Qty: 10 tablet, Refills: 0    Associated Diagnoses: Coronary artery disease due to lipid rich plaque      Vitamin E 100 units TABS Take 100 Units by mouth daily  Qty: 30 tablet, Refills: 0    Associated Diagnoses: Vitamin deficiency         CONTINUE these medications which have NOT CHANGED    Details   Omega-3 Fatty Acids (FISH OIL ADULT GUMMIES PO)            Allergies   Allergies   Allergen Reactions     No Known Drug Allergies

## 2020-12-29 NOTE — PROGRESS NOTES
Loni Ybarra  Gender: female  : 1940  [address] [facility] [address] [facility] [id number] [phone] (home)     Medical Record: [id number]  Pharmacy:    [facility] - [place], [place] - [address] - [place], [place] - [address] [address] [facility] [place] [facility]-FAX ONLY - [facility] - [place], [place] - [address] AT PORTAL TO REGISTERED [facility] SITES  [facility]  - [place], [place] - [address] [facility]  [facility] - [place], [place] - [address]  Primary Care Provider: Mackenzie Jerome    Parent's names are: Data Unavailable (mother) and Data Unavailable (father).      [facility]  2020     Discharge Phone Call:  Discharge to [facility]

## 2020-12-29 NOTE — PROVIDER NOTIFICATION
Notified MD at 0050 AM regarding changes in vital signs.      Spoke with: Dr Pedersen    Orders were obtained.    Comments: patient had a blood pressure elevated to 197/76, Md made aware and orders received with updated hydralazine orders.

## 2020-12-30 ENCOUNTER — NURSING HOME VISIT (OUTPATIENT)
Dept: GERIATRICS | Facility: CLINIC | Age: 80
End: 2020-12-30
Payer: MEDICARE

## 2020-12-30 VITALS
TEMPERATURE: 97.8 F | OXYGEN SATURATION: 96 % | SYSTOLIC BLOOD PRESSURE: 168 MMHG | HEART RATE: 61 BPM | DIASTOLIC BLOOD PRESSURE: 90 MMHG | RESPIRATION RATE: 20 BRPM

## 2020-12-30 DIAGNOSIS — E78.2 MIXED HYPERLIPIDEMIA: ICD-10-CM

## 2020-12-30 DIAGNOSIS — I10 HYPERTENSION, GOAL BELOW 140/90: ICD-10-CM

## 2020-12-30 DIAGNOSIS — N18.32 STAGE 3B CHRONIC KIDNEY DISEASE (H): ICD-10-CM

## 2020-12-30 DIAGNOSIS — Z91.89 AT RISK FOR MALNUTRITION: ICD-10-CM

## 2020-12-30 DIAGNOSIS — E11.22 TYPE 2 DIABETES MELLITUS WITH STAGE 3B CHRONIC KIDNEY DISEASE, WITHOUT LONG-TERM CURRENT USE OF INSULIN (H): ICD-10-CM

## 2020-12-30 DIAGNOSIS — I25.10 CORONARY ARTERY DISEASE, ANGINA PRESENCE UNSPECIFIED, UNSPECIFIED VESSEL OR LESION TYPE, UNSPECIFIED WHETHER NATIVE OR TRANSPLANTED HEART: ICD-10-CM

## 2020-12-30 DIAGNOSIS — F01.50 VASCULAR DEMENTIA WITHOUT BEHAVIORAL DISTURBANCE (H): ICD-10-CM

## 2020-12-30 DIAGNOSIS — N18.32 TYPE 2 DIABETES MELLITUS WITH STAGE 3B CHRONIC KIDNEY DISEASE, WITHOUT LONG-TERM CURRENT USE OF INSULIN (H): ICD-10-CM

## 2020-12-30 DIAGNOSIS — I82.451 ACUTE DEEP VEIN THROMBOSIS (DVT) OF RIGHT PERONEAL VEIN (H): Primary | ICD-10-CM

## 2020-12-30 DIAGNOSIS — R60.0 LOWER EXTREMITY EDEMA: ICD-10-CM

## 2020-12-30 DIAGNOSIS — R19.7 DIARRHEA OF PRESUMED INFECTIOUS ORIGIN: ICD-10-CM

## 2020-12-30 PROCEDURE — 99310 SBSQ NF CARE HIGH MDM 45: CPT | Performed by: NURSE PRACTITIONER
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
## 2020-12-31 ENCOUNTER — TELEPHONE (OUTPATIENT)
Dept: FAMILY MEDICINE | Facility: OTHER | Age: 80
End: 2020-12-31

## 2020-12-31 NOTE — TELEPHONE ENCOUNTER
Reason for Call:  Form, our goal is to have forms completed with 72 hours, however, some forms may require a visit or additional information.    Type of letter, form or note:  orders    Who is the form from?:  [facility]    Where did the form come from: form was faxed in    What clinic location was the form placed at?: [facility] - [phone]    Where the form was placed: [place] Box/Folder    What number is listed as a contact on the form?: [phone]       Additional comments:  Fax  [phone]    Call taken on 12/31/2020 at 10:38 AM by Megan Treviño

## 2021-01-01 ENCOUNTER — HEALTH MAINTENANCE LETTER (OUTPATIENT)
Age: 81
End: 2021-01-01

## 2021-01-01 ENCOUNTER — LAB (OUTPATIENT)
Dept: LAB | Facility: CLINIC | Age: 81
End: 2021-01-01
Attending: OBSTETRICS & GYNECOLOGY
Payer: MEDICARE

## 2021-01-01 ENCOUNTER — LAB REQUISITION (OUTPATIENT)
Dept: LAB | Facility: CLINIC | Age: 81
End: 2021-01-01
Payer: MEDICARE

## 2021-01-01 ENCOUNTER — APPOINTMENT (OUTPATIENT)
Dept: ULTRASOUND IMAGING | Facility: CLINIC | Age: 81
End: 2021-01-01
Attending: NURSE PRACTITIONER
Payer: MEDICARE

## 2021-01-01 ENCOUNTER — ASSISTED LIVING VISIT (OUTPATIENT)
Dept: GERIATRICS | Facility: CLINIC | Age: 81
End: 2021-01-01
Payer: MEDICARE

## 2021-01-01 ENCOUNTER — OFFICE VISIT (OUTPATIENT)
Dept: FAMILY MEDICINE | Facility: OTHER | Age: 81
End: 2021-01-01
Payer: MEDICARE

## 2021-01-01 ENCOUNTER — TELEPHONE (OUTPATIENT)
Dept: GERIATRICS | Facility: CLINIC | Age: 81
End: 2021-01-01

## 2021-01-01 ENCOUNTER — MYC MEDICAL ADVICE (OUTPATIENT)
Dept: FAMILY MEDICINE | Facility: OTHER | Age: 81
End: 2021-01-01

## 2021-01-01 ENCOUNTER — ANESTHESIA (OUTPATIENT)
Dept: SURGERY | Facility: CLINIC | Age: 81
End: 2021-01-01

## 2021-01-01 ENCOUNTER — OFFICE VISIT (OUTPATIENT)
Dept: AUDIOLOGY | Facility: CLINIC | Age: 81
End: 2021-01-01

## 2021-01-01 ENCOUNTER — HOSPITAL LABORATORY (OUTPATIENT)
Facility: OTHER | Age: 81
End: 2021-01-01

## 2021-01-01 ENCOUNTER — ANESTHESIA EVENT (OUTPATIENT)
Dept: SURGERY | Facility: CLINIC | Age: 81
End: 2021-01-01

## 2021-01-01 ENCOUNTER — HOSPITAL ENCOUNTER (OUTPATIENT)
Facility: CLINIC | Age: 81
Discharge: HOME | End: 2021-11-29
Attending: OBSTETRICS & GYNECOLOGY | Admitting: OBSTETRICS & GYNECOLOGY
Payer: MEDICARE

## 2021-01-01 ENCOUNTER — TRANSFERRED RECORDS (OUTPATIENT)
Dept: HEALTH INFORMATION MANAGEMENT | Facility: CLINIC | Age: 81
End: 2021-01-01

## 2021-01-01 ENCOUNTER — ANESTHESIA EVENT (OUTPATIENT)
Dept: SURGERY | Facility: CLINIC | Age: 81
End: 2021-01-01
Payer: MEDICARE

## 2021-01-01 ENCOUNTER — HOSPITAL ENCOUNTER (EMERGENCY)
Facility: CLINIC | Age: 81
Discharge: HOME | End: 2021-10-18
Attending: NURSE PRACTITIONER | Admitting: NURSE PRACTITIONER
Payer: MEDICARE

## 2021-01-01 ENCOUNTER — HOSPITAL ENCOUNTER (OUTPATIENT)
Facility: CLINIC | Age: 81
End: 2021-01-01
Attending: OBSTETRICS & GYNECOLOGY | Admitting: OBSTETRICS & GYNECOLOGY
Payer: MEDICARE

## 2021-01-01 ENCOUNTER — PRE VISIT (OUTPATIENT)
Dept: ONCOLOGY | Facility: CLINIC | Age: 81
End: 2021-01-01
Payer: MEDICARE

## 2021-01-01 ENCOUNTER — ONCOLOGY VISIT (OUTPATIENT)
Dept: ONCOLOGY | Facility: CLINIC | Age: 81
End: 2021-01-01
Attending: OBSTETRICS & GYNECOLOGY
Payer: MEDICARE

## 2021-01-01 ENCOUNTER — OFFICE VISIT (OUTPATIENT)
Dept: AUDIOLOGY | Facility: CLINIC | Age: 81
End: 2021-01-01
Payer: MEDICARE

## 2021-01-01 ENCOUNTER — ANESTHESIA (OUTPATIENT)
Dept: SURGERY | Facility: CLINIC | Age: 81
End: 2021-01-01
Payer: MEDICARE

## 2021-01-01 ENCOUNTER — VIRTUAL VISIT (OUTPATIENT)
Dept: GERIATRICS | Facility: CLINIC | Age: 81
End: 2021-01-01
Payer: MEDICARE

## 2021-01-01 ENCOUNTER — TELEPHONE (OUTPATIENT)
Dept: FAMILY MEDICINE | Facility: CLINIC | Age: 81
End: 2021-01-01

## 2021-01-01 ENCOUNTER — TELEPHONE (OUTPATIENT)
Dept: FAMILY MEDICINE | Facility: CLINIC | Age: 81
End: 2021-01-01
Payer: MEDICARE

## 2021-01-01 VITALS
WEIGHT: 191 LBS | TEMPERATURE: 98.4 F | HEIGHT: 65 IN | OXYGEN SATURATION: 99 % | RESPIRATION RATE: 18 BRPM | BODY MASS INDEX: 31.82 KG/M2 | HEART RATE: 74 BPM | DIASTOLIC BLOOD PRESSURE: 76 MMHG | SYSTOLIC BLOOD PRESSURE: 134 MMHG

## 2021-01-01 VITALS
BODY MASS INDEX: 32.26 KG/M2 | RESPIRATION RATE: 18 BRPM | WEIGHT: 193.6 LBS | OXYGEN SATURATION: 98 % | TEMPERATURE: 97.9 F | DIASTOLIC BLOOD PRESSURE: 77 MMHG | HEIGHT: 65 IN | HEART RATE: 64 BPM | SYSTOLIC BLOOD PRESSURE: 155 MMHG

## 2021-01-01 VITALS
SYSTOLIC BLOOD PRESSURE: 150 MMHG | WEIGHT: 191.5 LBS | TEMPERATURE: 97.5 F | DIASTOLIC BLOOD PRESSURE: 69 MMHG | HEART RATE: 72 BPM | BODY MASS INDEX: 31.87 KG/M2

## 2021-01-01 VITALS
TEMPERATURE: 97.8 F | BODY MASS INDEX: 31.9 KG/M2 | RESPIRATION RATE: 16 BRPM | OXYGEN SATURATION: 96 % | SYSTOLIC BLOOD PRESSURE: 136 MMHG | DIASTOLIC BLOOD PRESSURE: 71 MMHG | HEART RATE: 72 BPM | WEIGHT: 191.5 LBS | HEIGHT: 65 IN

## 2021-01-01 VITALS
WEIGHT: 183.2 LBS | DIASTOLIC BLOOD PRESSURE: 69 MMHG | BODY MASS INDEX: 30.52 KG/M2 | RESPIRATION RATE: 18 BRPM | TEMPERATURE: 97.8 F | HEART RATE: 63 BPM | OXYGEN SATURATION: 95 % | HEIGHT: 65 IN | SYSTOLIC BLOOD PRESSURE: 118 MMHG

## 2021-01-01 VITALS
DIASTOLIC BLOOD PRESSURE: 52 MMHG | SYSTOLIC BLOOD PRESSURE: 144 MMHG | HEART RATE: 61 BPM | TEMPERATURE: 98 F | HEIGHT: 65 IN | OXYGEN SATURATION: 97 % | BODY MASS INDEX: 30.42 KG/M2 | RESPIRATION RATE: 18 BRPM | WEIGHT: 182.6 LBS

## 2021-01-01 VITALS
HEART RATE: 64 BPM | SYSTOLIC BLOOD PRESSURE: 155 MMHG | HEIGHT: 65 IN | DIASTOLIC BLOOD PRESSURE: 77 MMHG | TEMPERATURE: 97.6 F | WEIGHT: 193.6 LBS | BODY MASS INDEX: 32.26 KG/M2 | OXYGEN SATURATION: 98 % | RESPIRATION RATE: 18 BRPM

## 2021-01-01 VITALS
BODY MASS INDEX: 32.07 KG/M2 | SYSTOLIC BLOOD PRESSURE: 144 MMHG | DIASTOLIC BLOOD PRESSURE: 64 MMHG | HEART RATE: 59 BPM | TEMPERATURE: 98 F | OXYGEN SATURATION: 97 % | HEIGHT: 65 IN | WEIGHT: 192.5 LBS | RESPIRATION RATE: 15 BRPM

## 2021-01-01 VITALS
HEART RATE: 60 BPM | TEMPERATURE: 98 F | OXYGEN SATURATION: 97 % | WEIGHT: 190 LBS | RESPIRATION RATE: 18 BRPM | SYSTOLIC BLOOD PRESSURE: 119 MMHG | DIASTOLIC BLOOD PRESSURE: 79 MMHG | BODY MASS INDEX: 31.62 KG/M2

## 2021-01-01 VITALS
OXYGEN SATURATION: 93 % | SYSTOLIC BLOOD PRESSURE: 130 MMHG | TEMPERATURE: 97.3 F | DIASTOLIC BLOOD PRESSURE: 47 MMHG | RESPIRATION RATE: 16 BRPM | HEART RATE: 65 BPM

## 2021-01-01 VITALS
RESPIRATION RATE: 18 BRPM | HEART RATE: 63 BPM | SYSTOLIC BLOOD PRESSURE: 118 MMHG | BODY MASS INDEX: 30.52 KG/M2 | TEMPERATURE: 98.4 F | HEIGHT: 65 IN | DIASTOLIC BLOOD PRESSURE: 69 MMHG | WEIGHT: 183.2 LBS | OXYGEN SATURATION: 97 %

## 2021-01-01 VITALS
HEART RATE: 78 BPM | HEIGHT: 65 IN | SYSTOLIC BLOOD PRESSURE: 165 MMHG | DIASTOLIC BLOOD PRESSURE: 72 MMHG | WEIGHT: 187 LBS | BODY MASS INDEX: 31.16 KG/M2 | TEMPERATURE: 97.5 F | RESPIRATION RATE: 18 BRPM

## 2021-01-01 VITALS
HEIGHT: 65 IN | HEART RATE: 78 BPM | TEMPERATURE: 97.5 F | DIASTOLIC BLOOD PRESSURE: 72 MMHG | OXYGEN SATURATION: 92 % | RESPIRATION RATE: 18 BRPM | SYSTOLIC BLOOD PRESSURE: 165 MMHG | BODY MASS INDEX: 31.29 KG/M2 | WEIGHT: 187.8 LBS

## 2021-01-01 VITALS
RESPIRATION RATE: 13 BRPM | SYSTOLIC BLOOD PRESSURE: 154 MMHG | HEART RATE: 59 BPM | OXYGEN SATURATION: 97 % | TEMPERATURE: 97.9 F | DIASTOLIC BLOOD PRESSURE: 66 MMHG | HEIGHT: 65 IN | WEIGHT: 192.8 LBS | BODY MASS INDEX: 32.12 KG/M2

## 2021-01-01 VITALS
TEMPERATURE: 97.5 F | OXYGEN SATURATION: 92 % | BODY MASS INDEX: 31.29 KG/M2 | SYSTOLIC BLOOD PRESSURE: 165 MMHG | HEIGHT: 65 IN | HEART RATE: 78 BPM | RESPIRATION RATE: 18 BRPM | DIASTOLIC BLOOD PRESSURE: 72 MMHG | WEIGHT: 187.8 LBS

## 2021-01-01 DIAGNOSIS — Z01.818 PRE-OPERATIVE EXAMINATION: ICD-10-CM

## 2021-01-01 DIAGNOSIS — F32.9 REACTIVE DEPRESSION: ICD-10-CM

## 2021-01-01 DIAGNOSIS — R93.89 ENDOMETRIAL THICKENING ON ULTRASOUND: Primary | ICD-10-CM
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
## 2021-01-01 DIAGNOSIS — E11.22 TYPE 2 DIABETES MELLITUS WITH STAGE 3A CHRONIC KIDNEY DISEASE, WITHOUT LONG-TERM CURRENT USE OF INSULIN (H): ICD-10-CM

## 2021-01-01 DIAGNOSIS — R41.82 ALTERED MENTAL STATUS, UNSPECIFIED: ICD-10-CM
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
## 2021-01-01 DIAGNOSIS — S02.2XXD OPEN FRACTURE OF NASAL BONE WITH ROUTINE HEALING, SUBSEQUENT ENCOUNTER: ICD-10-CM

## 2021-01-01 DIAGNOSIS — M79.675 PAIN AND SWELLING OF TOE OF LEFT FOOT: Primary | ICD-10-CM

## 2021-01-01 DIAGNOSIS — F41.1 GENERALIZED ANXIETY DISORDER: Primary | ICD-10-CM

## 2021-01-01 DIAGNOSIS — F01.50 VASCULAR DEMENTIA WITHOUT BEHAVIORAL DISTURBANCE (H): ICD-10-CM

## 2021-01-01 DIAGNOSIS — I25.10 CORONARY ARTERY DISEASE, ANGINA PRESENCE UNSPECIFIED, UNSPECIFIED VESSEL OR LESION TYPE, UNSPECIFIED WHETHER NATIVE OR TRANSPLANTED HEART: ICD-10-CM

## 2021-01-01 DIAGNOSIS — E11.9 TYPE 2 DIABETES MELLITUS WITHOUT COMPLICATIONS (H): ICD-10-CM

## 2021-01-01 DIAGNOSIS — H91.13 PRESBYCUSIS OF BOTH EARS: ICD-10-CM

## 2021-01-01 DIAGNOSIS — E11.22 TYPE 2 DIABETES MELLITUS WITH STAGE 3B CHRONIC KIDNEY DISEASE, WITHOUT LONG-TERM CURRENT USE OF INSULIN (H): Primary | ICD-10-CM

## 2021-01-01 DIAGNOSIS — N18.31 TYPE 2 DIABETES MELLITUS WITH STAGE 3A CHRONIC KIDNEY DISEASE, WITHOUT LONG-TERM CURRENT USE OF INSULIN (H): ICD-10-CM

## 2021-01-01 DIAGNOSIS — C54.2 MALIGNANT NEOPLASM OF MYOMETRIUM (H): Primary | ICD-10-CM

## 2021-01-01 DIAGNOSIS — N95.0 POSTMENOPAUSAL BLEEDING: ICD-10-CM

## 2021-01-01 DIAGNOSIS — Z11.52 ENCOUNTER FOR SCREENING FOR COVID-19: ICD-10-CM

## 2021-01-01 DIAGNOSIS — N18.31 STAGE 3A CHRONIC KIDNEY DISEASE (H): ICD-10-CM

## 2021-01-01 DIAGNOSIS — I25.10 CORONARY ARTERY DISEASE INVOLVING NATIVE CORONARY ARTERY OF NATIVE HEART WITHOUT ANGINA PECTORIS: Primary | ICD-10-CM

## 2021-01-01 DIAGNOSIS — N93.9 VAGINAL BLEEDING: ICD-10-CM

## 2021-01-01 DIAGNOSIS — I10 HYPERTENSION, GOAL BELOW 140/90: Primary | ICD-10-CM

## 2021-01-01 DIAGNOSIS — H90.3 SENSORINEURAL HEARING LOSS, BILATERAL: Primary | ICD-10-CM

## 2021-01-01 DIAGNOSIS — N95.0 POSTMENOPAUSAL BLEEDING: Primary | ICD-10-CM

## 2021-01-01 DIAGNOSIS — K05.10 GINGIVITIS: ICD-10-CM

## 2021-01-01 DIAGNOSIS — I10 HYPERTENSION, GOAL BELOW 140/90: ICD-10-CM

## 2021-01-01 DIAGNOSIS — R30.0 DYSURIA: Primary | ICD-10-CM

## 2021-01-01 DIAGNOSIS — N18.31 TYPE 2 DIABETES MELLITUS WITH STAGE 3A CHRONIC KIDNEY DISEASE, WITHOUT LONG-TERM CURRENT USE OF INSULIN (H): Primary | ICD-10-CM

## 2021-01-01 DIAGNOSIS — H90.6 MIXED HEARING LOSS, BILATERAL: Primary | ICD-10-CM

## 2021-01-01 DIAGNOSIS — M79.89 PAIN AND SWELLING OF TOE OF LEFT FOOT: Primary | ICD-10-CM

## 2021-01-01 DIAGNOSIS — E11.22 TYPE 2 DIABETES MELLITUS WITH STAGE 3A CHRONIC KIDNEY DISEASE, WITHOUT LONG-TERM CURRENT USE OF INSULIN (H): Primary | ICD-10-CM

## 2021-01-01 DIAGNOSIS — S00.83XD TRAUMATIC HEMATOMA OF FOREHEAD, SUBSEQUENT ENCOUNTER: ICD-10-CM

## 2021-01-01 DIAGNOSIS — Z11.59 ENCOUNTER FOR SCREENING FOR OTHER VIRAL DISEASES: ICD-10-CM

## 2021-01-01 DIAGNOSIS — E11.22 TYPE 2 DIABETES MELLITUS WITH STAGE 3B CHRONIC KIDNEY DISEASE, WITHOUT LONG-TERM CURRENT USE OF INSULIN (H): ICD-10-CM

## 2021-01-01 DIAGNOSIS — T14.8XXA OTHER INJURY OF UNSPECIFIED BODY REGION, INITIAL ENCOUNTER: ICD-10-CM

## 2021-01-01 DIAGNOSIS — N32.81 OVERACTIVE BLADDER: ICD-10-CM

## 2021-01-01 DIAGNOSIS — S00.83XD TRAUMATIC HEMATOMA OF FOREHEAD, SUBSEQUENT ENCOUNTER: Primary | ICD-10-CM

## 2021-01-01 DIAGNOSIS — N18.32 TYPE 2 DIABETES MELLITUS WITH STAGE 3B CHRONIC KIDNEY DISEASE, WITHOUT LONG-TERM CURRENT USE OF INSULIN (H): ICD-10-CM

## 2021-01-01 DIAGNOSIS — C54.1 ENDOMETRIAL CANCER (H): Primary | ICD-10-CM

## 2021-01-01 DIAGNOSIS — F41.1 GENERALIZED ANXIETY DISORDER: ICD-10-CM

## 2021-01-01 DIAGNOSIS — N94.89 ENDOMETRIAL MASS: ICD-10-CM

## 2021-01-01 DIAGNOSIS — N18.32 TYPE 2 DIABETES MELLITUS WITH STAGE 3B CHRONIC KIDNEY DISEASE, WITHOUT LONG-TERM CURRENT USE OF INSULIN (H): Primary | ICD-10-CM

## 2021-01-01 DIAGNOSIS — H91.13 PRESBYCUSIS OF BOTH EARS: Primary | ICD-10-CM

## 2021-01-01 DIAGNOSIS — R60.0 LOWER EXTREMITY EDEMA: Primary | ICD-10-CM

## 2021-01-01 DIAGNOSIS — R31.9 HEMATURIA, UNSPECIFIED: ICD-10-CM

## 2021-01-01 LAB
ALBUMIN UR-MCNC: 100 MG/DL
ALBUMIN UR-MCNC: NEGATIVE MG/DL
ALBUMIN UR-MCNC: NEGATIVE MG/DL
ANION GAP SERPL CALCULATED.3IONS-SCNC: 2 MMOL/L (ref 3–14)
ANION GAP SERPL CALCULATED.3IONS-SCNC: 4 MMOL/L (ref 3–14)
ANION GAP SERPL CALCULATED.3IONS-SCNC: 4 MMOL/L (ref 3–14)
ANION GAP SERPL CALCULATED.3IONS-SCNC: 5 MMOL/L (ref 3–14)
ANION GAP SERPL CALCULATED.3IONS-SCNC: 6 MMOL/L (ref 3–14)
APPEARANCE UR: ABNORMAL
APPEARANCE UR: CLEAR
APPEARANCE UR: CLEAR
BACTERIA #/AREA URNS HPF: ABNORMAL /HPF
BACTERIA UR CULT: NORMAL
BASOPHILS # BLD AUTO: 0 10E3/UL (ref 0–0.2)
BASOPHILS NFR BLD AUTO: 0 %
BILIRUB UR QL STRIP: NEGATIVE
BUN SERPL-MCNC: 14 MG/DL (ref 7–30)
BUN SERPL-MCNC: 15 MG/DL (ref 7–30)
BUN SERPL-MCNC: 18 MG/DL (ref 7–30)
BUN SERPL-MCNC: 19 MG/DL (ref 7–30)
BUN SERPL-MCNC: 21 MG/DL (ref 7–30)
CALCIUM SERPL-MCNC: 10 MG/DL (ref 8.5–10.1)
CALCIUM SERPL-MCNC: 9.3 MG/DL (ref 8.5–10.1)
CALCIUM SERPL-MCNC: 9.4 MG/DL (ref 8.5–10.1)
CALCIUM SERPL-MCNC: 9.5 MG/DL (ref 8.5–10.1)
CALCIUM SERPL-MCNC: 9.9 MG/DL (ref 8.5–10.1)
CAOX CRY #/AREA URNS HPF: ABNORMAL /HPF
CHLORIDE BLD-SCNC: 103 MMOL/L (ref 94–109)
CHLORIDE BLD-SCNC: 107 MMOL/L (ref 94–109)
CHLORIDE BLD-SCNC: 107 MMOL/L (ref 94–109)
CHLORIDE BLD-SCNC: 108 MMOL/L (ref 94–109)
CHLORIDE BLD-SCNC: 109 MMOL/L (ref 94–109)
CO2 SERPL-SCNC: 26 MMOL/L (ref 20–32)
CO2 SERPL-SCNC: 28 MMOL/L (ref 20–32)
CO2 SERPL-SCNC: 28 MMOL/L (ref 20–32)
CO2 SERPL-SCNC: 29 MMOL/L (ref 20–32)
CO2 SERPL-SCNC: 32 MMOL/L (ref 20–32)
COLOR UR AUTO: YELLOW
CREAT SERPL-MCNC: 0.8 MG/DL (ref 0.52–1.04)
CREAT SERPL-MCNC: 0.85 MG/DL (ref 0.52–1.04)
CREAT SERPL-MCNC: 0.95 MG/DL (ref 0.52–1.04)
CREAT SERPL-MCNC: 0.96 MG/DL (ref 0.52–1.04)
CREAT SERPL-MCNC: 0.97 MG/DL (ref 0.52–1.04)
EOSINOPHIL # BLD AUTO: 0.3 10E3/UL (ref 0–0.7)
EOSINOPHIL NFR BLD AUTO: 3 %
ERYTHROCYTE [DISTWIDTH] IN BLOOD BY AUTOMATED COUNT: 12.4 % (ref 10–15)
ERYTHROCYTE [DISTWIDTH] IN BLOOD BY AUTOMATED COUNT: 12.6 % (ref 10–15)
GFR SERPL CREATININE-BSD FRML MDRD: 55 ML/MIN/1.73M2
GFR SERPL CREATININE-BSD FRML MDRD: 56 ML/MIN/1.73M2
GFR SERPL CREATININE-BSD FRML MDRD: 56 ML/MIN/1.73M2
GFR SERPL CREATININE-BSD FRML MDRD: 64 ML/MIN/1.73M2
GFR SERPL CREATININE-BSD FRML MDRD: 69 ML/MIN/1.73M2
GLUCOSE BLD-MCNC: 109 MG/DL (ref 70–99)
GLUCOSE BLD-MCNC: 149 MG/DL (ref 70–99)
GLUCOSE BLD-MCNC: 156 MG/DL (ref 70–99)
GLUCOSE BLD-MCNC: 182 MG/DL (ref 70–99)
GLUCOSE BLD-MCNC: 240 MG/DL (ref 70–99)
GLUCOSE BLDC GLUCOMTR-MCNC: 113 MG/DL (ref 70–99)
GLUCOSE UR STRIP-MCNC: >499 MG/DL
HBA1C MFR BLD: 8.1 % (ref 0–5.6)
HCT VFR BLD AUTO: 35.3 % (ref 35–47)
HCT VFR BLD AUTO: 39 % (ref 35–47)
HGB BLD-MCNC: 11.8 G/DL (ref 11.7–15.7)
HGB BLD-MCNC: 13 G/DL (ref 11.7–15.7)
HGB BLD-MCNC: 13 G/DL (ref 11.7–15.7)
HGB UR QL STRIP: ABNORMAL
HGB UR QL STRIP: ABNORMAL
HGB UR QL STRIP: NEGATIVE
IMM GRANULOCYTES # BLD: 0 10E3/UL
IMM GRANULOCYTES NFR BLD: 0 %
KETONES UR STRIP-MCNC: NEGATIVE MG/DL
LABORATORY COMMENT REPORT: NORMAL
LEUKOCYTE ESTERASE UR QL STRIP: ABNORMAL
LEUKOCYTE ESTERASE UR QL STRIP: NEGATIVE
LEUKOCYTE ESTERASE UR QL STRIP: NEGATIVE
LYMPHOCYTES # BLD AUTO: 1.8 10E3/UL (ref 0.8–5.3)
LYMPHOCYTES NFR BLD AUTO: 22 %
MCH RBC QN AUTO: 31.4 PG (ref 26.5–33)
MCH RBC QN AUTO: 31.7 PG (ref 26.5–33)
MCHC RBC AUTO-ENTMCNC: 33.3 G/DL (ref 31.5–36.5)
MCHC RBC AUTO-ENTMCNC: 33.4 G/DL (ref 31.5–36.5)
MCV RBC AUTO: 94 FL (ref 78–100)
MCV RBC AUTO: 95 FL (ref 78–100)
MONOCYTES # BLD AUTO: 0.7 10E3/UL (ref 0–1.3)
MONOCYTES NFR BLD AUTO: 9 %
MUCOUS THREADS #/AREA URNS LPF: PRESENT /LPF
MUCOUS THREADS #/AREA URNS LPF: PRESENT /LPF
NEUTROPHILS # BLD AUTO: 5.3 10E3/UL (ref 1.6–8.3)
NEUTROPHILS NFR BLD AUTO: 66 %
NITRATE UR QL: NEGATIVE
NRBC # BLD AUTO: 0 10E3/UL
NRBC BLD AUTO-RTO: 0 /100
PATH REPORT.COMMENTS IMP SPEC: ABNORMAL
PATH REPORT.COMMENTS IMP SPEC: YES
PATH REPORT.FINAL DX SPEC: ABNORMAL
PATH REPORT.GROSS SPEC: ABNORMAL
PATH REPORT.MICROSCOPIC SPEC OTHER STN: ABNORMAL
PATH REPORT.MICROSCOPIC SPEC OTHER STN: ABNORMAL
PATH REPORT.RELEVANT HX SPEC: ABNORMAL
PH UR STRIP: 5 [PH] (ref 5–7)
PHOTO IMAGE: ABNORMAL
PLATELET # BLD AUTO: 194 10E3/UL (ref 150–450)
PLATELET # BLD AUTO: 198 10E3/UL (ref 150–450)
POTASSIUM BLD-SCNC: 3.8 MMOL/L (ref 3.4–5.3)
POTASSIUM BLD-SCNC: 3.8 MMOL/L (ref 3.4–5.3)
POTASSIUM BLD-SCNC: 3.9 MMOL/L (ref 3.4–5.3)
POTASSIUM BLD-SCNC: 4.1 MMOL/L (ref 3.4–5.3)
POTASSIUM BLD-SCNC: 4.2 MMOL/L (ref 3.4–5.3)
RBC # BLD AUTO: 3.76 10E6/UL (ref 3.8–5.2)
RBC # BLD AUTO: 4.1 10E6/UL (ref 3.8–5.2)
RBC URINE: 2 /HPF
RBC URINE: 3 /HPF
SARS-COV-2 RNA RESP QL NAA+PROBE: NEGATIVE
SARS-COV-2 RNA RESP QL NAA+PROBE: NORMAL
SARS-COV-2 RNA RESP QL NAA+PROBE: NOT DETECTED
SARS-COV-2 RNA RESP QL NAA+PROBE: NOT DETECTED
SODIUM SERPL-SCNC: 137 MMOL/L (ref 133–144)
SODIUM SERPL-SCNC: 139 MMOL/L (ref 133–144)
SODIUM SERPL-SCNC: 140 MMOL/L (ref 133–144)
SODIUM SERPL-SCNC: 140 MMOL/L (ref 133–144)
SODIUM SERPL-SCNC: 142 MMOL/L (ref 133–144)
SP GR UR STRIP: 1.01 (ref 1–1.03)
SP GR UR STRIP: 1.02 (ref 1–1.03)
SP GR UR STRIP: 1.02 (ref 1–1.03)
SPECIMEN SOURCE: NORMAL
SQUAMOUS EPITHELIAL: 1 /HPF
UROBILINOGEN UR STRIP-MCNC: 2 MG/DL
UROBILINOGEN UR STRIP-MCNC: 2 MG/DL
UROBILINOGEN UR STRIP-MCNC: NORMAL MG/DL
WBC # BLD AUTO: 8.2 10E3/UL (ref 4–11)
WBC # BLD AUTO: 8.7 10E3/UL (ref 4–11)
WBC URINE: 2 /HPF
WBC URINE: 6 /HPF

## 2021-01-01 PROCEDURE — 36415 COLL VENOUS BLD VENIPUNCTURE: CPT | Mod: ORL | Performed by: NURSE PRACTITIONER

## 2021-01-01 PROCEDURE — 85018 HEMOGLOBIN: CPT | Mod: ORL | Performed by: NURSE PRACTITIONER

## 2021-01-01 PROCEDURE — 250N000009 HC RX 250: Performed by: NURSE ANESTHETIST, CERTIFIED REGISTERED

## 2021-01-01 PROCEDURE — 80048 BASIC METABOLIC PNL TOTAL CA: CPT | Mod: ORL | Performed by: NURSE PRACTITIONER

## 2021-01-01 PROCEDURE — 250N000011 HC RX IP 250 OP 636: Performed by: NURSE ANESTHETIST, CERTIFIED REGISTERED

## 2021-01-01 PROCEDURE — 81001 URINALYSIS AUTO W/SCOPE: CPT | Performed by: NURSE PRACTITIONER

## 2021-01-01 PROCEDURE — 76830 TRANSVAGINAL US NON-OB: CPT

## 2021-01-01 PROCEDURE — 36415 COLL VENOUS BLD VENIPUNCTURE: CPT | Performed by: NURSE PRACTITIONER

## 2021-01-01 PROCEDURE — 92550 TYMPANOMETRY & REFLEX THRESH: CPT | Performed by: AUDIOLOGIST

## 2021-01-01 PROCEDURE — 360N000076 HC SURGERY LEVEL 3, PER MIN: Performed by: OBSTETRICS & GYNECOLOGY

## 2021-01-01 PROCEDURE — 81001 URINALYSIS AUTO W/SCOPE: CPT | Mod: ORL | Performed by: NURSE PRACTITIONER

## 2021-01-01 PROCEDURE — 81003 URINALYSIS AUTO W/O SCOPE: CPT | Mod: ORL | Performed by: NURSE PRACTITIONER

## 2021-01-01 PROCEDURE — 92557 COMPREHENSIVE HEARING TEST: CPT | Performed by: AUDIOLOGIST

## 2021-01-01 PROCEDURE — 82962 GLUCOSE BLOOD TEST: CPT

## 2021-01-01 PROCEDURE — 272N000001 HC OR GENERAL SUPPLY STERILE: Performed by: OBSTETRICS & GYNECOLOGY

## 2021-01-01 PROCEDURE — 58558 HYSTEROSCOPY BIOPSY: CPT | Performed by: OBSTETRICS & GYNECOLOGY

## 2021-01-01 PROCEDURE — U0005 INFEC AGEN DETEC AMPLI PROBE: HCPCS

## 2021-01-01 PROCEDURE — 83036 HEMOGLOBIN GLYCOSYLATED A1C: CPT | Mod: ORL | Performed by: NURSE PRACTITIONER

## 2021-01-01 PROCEDURE — P9604 ONE-WAY ALLOW PRORATED TRIP: HCPCS | Mod: ORL | Performed by: NURSE PRACTITIONER

## 2021-01-01 PROCEDURE — 88305 TISSUE EXAM BY PATHOLOGIST: CPT | Mod: 26 | Performed by: PATHOLOGY

## 2021-01-01 PROCEDURE — 99207 PR NO CHARGE LOS: CPT | Performed by: AUDIOLOGIST

## 2021-01-01 PROCEDURE — 710N000012 HC RECOVERY PHASE 2, PER MINUTE: Performed by: OBSTETRICS & GYNECOLOGY

## 2021-01-01 PROCEDURE — 99205 OFFICE O/P NEW HI 60 MIN: CPT | Performed by: OBSTETRICS & GYNECOLOGY

## 2021-01-01 PROCEDURE — 80048 BASIC METABOLIC PNL TOTAL CA: CPT | Performed by: NURSE PRACTITIONER

## 2021-01-01 PROCEDURE — U0003 INFECTIOUS AGENT DETECTION BY NUCLEIC ACID (DNA OR RNA); SEVERE ACUTE RESPIRATORY SYNDROME CORONAVIRUS 2 (SARS-COV-2) (CORONAVIRUS DISEASE [COVID-19]), AMPLIFIED PROBE TECHNIQUE, MAKING USE OF HIGH THROUGHPUT TECHNOLOGIES AS DESCRIBED BY CMS-2020-01-R: HCPCS

## 2021-01-01 PROCEDURE — 88342 IMHCHEM/IMCYTCHM 1ST ANTB: CPT | Mod: TC | Performed by: OBSTETRICS & GYNECOLOGY

## 2021-01-01 PROCEDURE — 87635 SARS-COV-2 COVID-19 AMP PRB: CPT | Performed by: OBSTETRICS & GYNECOLOGY

## 2021-01-01 PROCEDURE — U0003 INFECTIOUS AGENT DETECTION BY NUCLEIC ACID (DNA OR RNA); SEVERE ACUTE RESPIRATORY SYNDROME CORONAVIRUS 2 (SARS-COV-2) (CORONAVIRUS DISEASE [COVID-19]), AMPLIFIED PROBE TECHNIQUE, MAKING USE OF HIGH THROUGHPUT TECHNOLOGIES AS DESCRIBED BY CMS-2020-01-R: HCPCS | Mod: ORL | Performed by: NURSE PRACTITIONER

## 2021-01-01 PROCEDURE — U0005 INFEC AGEN DETEC AMPLI PROBE: HCPCS | Mod: ORL | Performed by: NURSE PRACTITIONER

## 2021-01-01 PROCEDURE — 99284 EMERGENCY DEPT VISIT MOD MDM: CPT | Performed by: NURSE PRACTITIONER

## 2021-01-01 PROCEDURE — 87086 URINE CULTURE/COLONY COUNT: CPT | Mod: ORL | Performed by: NURSE PRACTITIONER

## 2021-01-01 PROCEDURE — V5014 HEARING AID REPAIR/MODIFYING: HCPCS | Performed by: AUDIOLOGIST

## 2021-01-01 PROCEDURE — 999N000141 HC STATISTIC PRE-PROCEDURE NURSING ASSESSMENT: Performed by: OBSTETRICS & GYNECOLOGY

## 2021-01-01 PROCEDURE — 88342 IMHCHEM/IMCYTCHM 1ST ANTB: CPT | Mod: 26 | Performed by: PATHOLOGY

## 2021-01-01 PROCEDURE — 99215 OFFICE O/P EST HI 40 MIN: CPT | Performed by: OBSTETRICS & GYNECOLOGY

## 2021-01-01 PROCEDURE — 99284 EMERGENCY DEPT VISIT MOD MDM: CPT | Mod: 25 | Performed by: NURSE PRACTITIONER

## 2021-01-01 PROCEDURE — 370N000017 HC ANESTHESIA TECHNICAL FEE, PER MIN: Performed by: OBSTETRICS & GYNECOLOGY

## 2021-01-01 PROCEDURE — 258N000003 HC RX IP 258 OP 636: Performed by: PAIN MEDICINE

## 2021-01-01 PROCEDURE — 250N000009 HC RX 250: Performed by: OBSTETRICS & GYNECOLOGY

## 2021-01-01 PROCEDURE — 88341 IMHCHEM/IMCYTCHM EA ADD ANTB: CPT | Mod: 26 | Performed by: PATHOLOGY

## 2021-01-01 PROCEDURE — G0463 HOSPITAL OUTPT CLINIC VISIT: HCPCS

## 2021-01-01 PROCEDURE — 85025 COMPLETE CBC W/AUTO DIFF WBC: CPT | Performed by: NURSE PRACTITIONER

## 2021-01-01 PROCEDURE — 85027 COMPLETE CBC AUTOMATED: CPT | Mod: ORL | Performed by: NURSE PRACTITIONER

## 2021-01-01 RX ORDER — MEPERIDINE HYDROCHLORIDE 25 MG/ML
12.5 INJECTION INTRAMUSCULAR; INTRAVENOUS; SUBCUTANEOUS
Status: DISCONTINUED | OUTPATIENT
Start: 2021-01-01 | End: 2021-01-01 | Stop reason: HOSPADM

## 2021-01-01 RX ORDER — SODIUM CHLORIDE, SODIUM LACTATE, POTASSIUM CHLORIDE, CALCIUM CHLORIDE 600; 310; 30; 20 MG/100ML; MG/100ML; MG/100ML; MG/100ML
INJECTION, SOLUTION INTRAVENOUS CONTINUOUS
Status: DISCONTINUED | OUTPATIENT
Start: 2021-01-01 | End: 2021-01-01 | Stop reason: HOSPADM

## 2021-01-01 RX ORDER — ACETAMINOPHEN 325 MG/1
975 TABLET ORAL ONCE
Status: DISCONTINUED | OUTPATIENT
Start: 2021-01-01 | End: 2021-01-01 | Stop reason: HOSPADM

## 2021-01-01 RX ORDER — PROPOFOL 10 MG/ML
INJECTION, EMULSION INTRAVENOUS CONTINUOUS PRN
Status: DISCONTINUED | OUTPATIENT
Start: 2021-01-01 | End: 2021-01-01

## 2021-01-01 RX ORDER — CHLORHEXIDINE GLUCONATE ORAL RINSE 1.2 MG/ML
SOLUTION DENTAL
Qty: 473 ML | Refills: 11 | Status: SHIPPED | OUTPATIENT
Start: 2021-01-01 | End: 2022-01-01

## 2021-01-01 RX ORDER — LIDOCAINE 40 MG/G
CREAM TOPICAL
Status: DISCONTINUED | OUTPATIENT
Start: 2021-01-01 | End: 2021-01-01 | Stop reason: HOSPADM

## 2021-01-01 RX ORDER — ALBUTEROL SULFATE 0.83 MG/ML
2.5 SOLUTION RESPIRATORY (INHALATION) EVERY 4 HOURS PRN
Status: DISCONTINUED | OUTPATIENT
Start: 2021-01-01 | End: 2021-01-01 | Stop reason: HOSPADM

## 2021-01-01 RX ORDER — LORAZEPAM 0.5 MG/1
0.5 TABLET ORAL EVERY 6 HOURS PRN
Qty: 10 TABLET | Refills: 0 | Status: SHIPPED | OUTPATIENT
Start: 2021-01-01 | End: 2021-01-01

## 2021-01-01 RX ORDER — ONDANSETRON 2 MG/ML
4 INJECTION INTRAMUSCULAR; INTRAVENOUS EVERY 30 MIN PRN
Status: DISCONTINUED | OUTPATIENT
Start: 2021-01-01 | End: 2021-01-01 | Stop reason: HOSPADM

## 2021-01-01 RX ORDER — LORAZEPAM 0.5 MG/1
TABLET ORAL
Qty: 10 TABLET | Refills: 5 | Status: SHIPPED | OUTPATIENT
Start: 2021-01-01 | End: 2022-01-01

## 2021-01-01 RX ORDER — MULTIVIT-MIN/IRON/FOLIC ACID/K 18-600-40
1 CAPSULE ORAL
COMMUNITY
End: 2022-01-01

## 2021-01-01 RX ORDER — FENTANYL CITRATE 50 UG/ML
INJECTION, SOLUTION INTRAMUSCULAR; INTRAVENOUS PRN
Status: DISCONTINUED | OUTPATIENT
Start: 2021-01-01 | End: 2021-01-01

## 2021-01-01 RX ORDER — ONDANSETRON 2 MG/ML
INJECTION INTRAMUSCULAR; INTRAVENOUS PRN
Status: DISCONTINUED | OUTPATIENT
Start: 2021-01-01 | End: 2021-01-01

## 2021-01-01 RX ORDER — AMLODIPINE BESYLATE 5 MG/1
10 TABLET ORAL DAILY
Qty: 30 TABLET | Refills: 0
Start: 2021-01-01 | End: 2021-01-01

## 2021-01-01 RX ORDER — CITALOPRAM HYDROBROMIDE 10 MG/1
10 TABLET ORAL DAILY
Start: 2021-01-01 | End: 2022-01-01

## 2021-01-01 RX ORDER — PROPOFOL 10 MG/ML
INJECTION, EMULSION INTRAVENOUS PRN
Status: DISCONTINUED | OUTPATIENT
Start: 2021-01-01 | End: 2021-01-01

## 2021-01-01 RX ORDER — MEDROXYPROGESTERONE ACETATE 10 MG
TABLET ORAL
Qty: 31 TABLET | Refills: 11 | Status: SHIPPED | OUTPATIENT
Start: 2021-01-01 | End: 2022-01-01

## 2021-01-01 RX ORDER — MEDROXYPROGESTERONE ACETATE 10 MG
10 TABLET ORAL DAILY
Qty: 60 TABLET | Refills: 0 | Status: SHIPPED | OUTPATIENT
Start: 2021-01-01 | End: 2021-01-01

## 2021-01-01 RX ORDER — LIDOCAINE HYDROCHLORIDE 20 MG/ML
INJECTION, SOLUTION INFILTRATION; PERINEURAL PRN
Status: DISCONTINUED | OUTPATIENT
Start: 2021-01-01 | End: 2021-01-01

## 2021-01-01 RX ORDER — ONDANSETRON 4 MG/1
4 TABLET, ORALLY DISINTEGRATING ORAL EVERY 30 MIN PRN
Status: DISCONTINUED | OUTPATIENT
Start: 2021-01-01 | End: 2021-01-01 | Stop reason: HOSPADM

## 2021-01-01 RX ORDER — AMLODIPINE BESYLATE 10 MG/1
10 TABLET ORAL DAILY
COMMUNITY
Start: 2021-01-01 | End: 2021-01-01

## 2021-01-01 RX ORDER — FENTANYL CITRATE 50 UG/ML
25 INJECTION, SOLUTION INTRAMUSCULAR; INTRAVENOUS
Status: DISCONTINUED | OUTPATIENT
Start: 2021-01-01 | End: 2021-01-01 | Stop reason: HOSPADM

## 2021-01-01 RX ADMIN — LIDOCAINE HYDROCHLORIDE 60 MG: 20 INJECTION, SOLUTION INFILTRATION; PERINEURAL at 07:51

## 2021-01-01 RX ADMIN — ONDANSETRON 4 MG: 2 INJECTION INTRAMUSCULAR; INTRAVENOUS at 08:02

## 2021-01-01 RX ADMIN — PROPOFOL 50 MG: 10 INJECTION, EMULSION INTRAVENOUS at 07:51

## 2021-01-01 RX ADMIN — FENTANYL CITRATE 50 MCG: 50 INJECTION, SOLUTION INTRAMUSCULAR; INTRAVENOUS at 07:54

## 2021-01-01 RX ADMIN — PROPOFOL 100 MCG/KG/MIN: 10 INJECTION, EMULSION INTRAVENOUS at 07:51

## 2021-01-01 RX ADMIN — FENTANYL CITRATE 50 MCG: 50 INJECTION, SOLUTION INTRAMUSCULAR; INTRAVENOUS at 07:47

## 2021-01-01 RX ADMIN — SODIUM CHLORIDE, POTASSIUM CHLORIDE, SODIUM LACTATE AND CALCIUM CHLORIDE: 600; 310; 30; 20 INJECTION, SOLUTION INTRAVENOUS at 06:38

## 2021-01-01 ASSESSMENT — PAIN SCALES - GENERAL
PAINLEVEL: NO PAIN (0)
PAINLEVEL: NO PAIN (0)

## 2021-01-01 ASSESSMENT — MIFFLIN-ST. JEOR
SCORE: 1332.25
SCORE: 1314.11
SCORE: 1291.87
SCORE: 1339.05
SCORE: 1344.04
SCORE: 1344.04
SCORE: 1317.74
SCORE: 1340.42
SCORE: 1294.15
SCORE: 1291.87
SCORE: 1317.74
SCORE: 1334.52

## 2021-01-01 ASSESSMENT — LIFESTYLE VARIABLES
TOBACCO_USE: 0
TOBACCO_USE: 0

## 2021-01-04 ENCOUNTER — DOCUMENTATION ONLY (OUTPATIENT)
Dept: OTHER | Facility: CLINIC | Age: 81
End: 2021-01-04

## 2021-01-05 ENCOUNTER — NURSING HOME VISIT (OUTPATIENT)
Dept: GERIATRICS | Facility: CLINIC | Age: 81
End: 2021-01-05
Payer: MEDICARE

## 2021-01-05 VITALS
BODY MASS INDEX: 30.56 KG/M2 | OXYGEN SATURATION: 94 % | WEIGHT: 179 LBS | HEIGHT: 64 IN | SYSTOLIC BLOOD PRESSURE: 170 MMHG | HEART RATE: 66 BPM | DIASTOLIC BLOOD PRESSURE: 71 MMHG | RESPIRATION RATE: 20 BRPM | TEMPERATURE: 97.7 F

## 2021-01-05 DIAGNOSIS — E78.2 MIXED HYPERLIPIDEMIA: Primary | ICD-10-CM

## 2021-01-05 DIAGNOSIS — I82.431 ACUTE EMBOLISM AND THROMBOSIS OF RIGHT POPLITEAL VEIN (H): ICD-10-CM

## 2021-01-05 DIAGNOSIS — I10 HYPERTENSION, GOAL BELOW 140/90: ICD-10-CM

## 2021-01-05 DIAGNOSIS — F01.50 VASCULAR DEMENTIA WITHOUT BEHAVIORAL DISTURBANCE (H): ICD-10-CM

## 2021-01-05 PROCEDURE — 99309 SBSQ NF CARE MODERATE MDM 30: CPT | Performed by: NURSE PRACTITIONER

## 2021-01-05 ASSESSMENT — MIFFLIN-ST. JEOR: SCORE: 1261.94

## 2021-01-05 NOTE — LETTER
1/5/2021        RE: Loni Ybarra  [address] [facility] [address]        [place] [facility]  [place] Medical Record Number:  [id number]  Place of Service where encounter took place:  [facility] (S) [[id number]]  Chief Complaint   Patient presents with     Nursing Home Acute       HPI:    Loni Ybarra  is a 80 year old (1940), who is being seen today for an episodic care visit.  HPI information obtained from: facility chart records, facility staff and patient report. Today's concern is:    1. Mixed hyperlipidemia    2. Hypertension, goal below 140/90    3. Acute embolism and thrombosis of right popliteal vein (H)    4. Vascular dementia without behavioral disturbance (H)      Nursing asked this NP to review medications today as the med passers are finding that Glendy is chewing medications that are long acting like the Metoprolol and then her fish oil caps that were sent over.    Glendy also had a repeat doppler done of the right lower leg to check status of blood clot seen prior to admission.    Past Medical and Surgical History reviewed in Epic today.    MEDICATIONS:    Current Outpatient Medications   Medication Sig Dispense Refill     metoprolol tartrate (LOPRESSOR) 100 MG tablet Take 1 tablet (100 mg) by mouth 2 times daily       Omega-3 Fatty Acids (FISH OIL ADULT GUMMIES) 113.5 MG CHEW Take 444 mg by mouth daily       amLODIPine (NORVASC) 5 MG tablet Take 1 tablet (5 mg) by mouth daily 30 tablet 0     aspirin (ASA) 325 MG tablet Take 1 tablet (325 mg) by mouth daily 30 tablet 0     atorvastatin (LIPITOR) 40 MG tablet Take 1 tablet (40 mg) by mouth daily 30 tablet 0     Bioflavonoid Products (VITAMIN C) CHEW Take 1 chew tab by mouth daily 30 tablet 0     chlorhexidine (PERIDEX) 0.12 % solution Swish and spit 15 mLs in mouth 2 times daily 118 mL 0     losartan (COZAAR) 100 MG tablet Take 1 tablet (100 mg) by mouth daily 30 tablet 0      "nitroGLYcerin (NITROSTAT) 0.4 MG sublingual tablet Place 1 tablet (0.4 mg) under the tongue every 5 minutes as needed for chest pain 10 tablet 0     Vitamin E 100 units TABS Take 100 Units by mouth daily 30 tablet 0         REVIEW OF SYSTEMS:  Limited secondary to cognitive impairment but today pt reports no pain, no shortness of breath that is acute, no abdominal tenderness    Objective:  BP (!) 170/71   Pulse 66   Temp 97.7  F (36.5  C)   Resp 20   Ht 1.626 m (5' 4\")   Wt 81.2 kg (179 lb)   LMP  (LMP Unknown)   SpO2 94%   BMI 30.73 kg/m    Exam:  Found Glendy sitting in her room in the wheelchair.  Not really doing anything or engaging in anything around her.  Smiles and seems happy.  Coloring is pink, warm and dry.  Heart rate is regular and strong.  Lungs are clear.  No use of oxygen.      Blood pressures vary  110-190's/50-90's.      Doppler findings of right lower leg - some thickening of walls lesser saphenous vein from popliteal fossa through distal calf as showing possible past clot.    Labs:   Component      Latest Ref Rng & Units 12/29/2020   Sodium      133 - 144 mmol/L 142   Potassium      3.4 - 5.3 mmol/L 3.6   Chloride      94 - 109 mmol/L 110 (H)   Carbon Dioxide      20 - 32 mmol/L 27   Anion Gap      3 - 14 mmol/L 5   Glucose      70 - 99 mg/dL 137 (H)   Urea Nitrogen      7 - 30 mg/dL 12   Creatinine      0.52 - 1.04 mg/dL 0.88   GFR Estimate      >60 mL/min/1.73:m2 62   GFR Estimate If Black      >60 mL/min/1.73:m2 71   Calcium      8.5 - 10.1 mg/dL 10.5 (H)   WBC      4.0 - 11.0 10e9/L 9.0   RBC Count      3.8 - 5.2 10e12/L 4.18   Hemoglobin      11.7 - 15.7 g/dL 12.7   Hematocrit      35.0 - 47.0 % 38.3   MCV      78 - 100 fl 92   MCH      26.5 - 33.0 pg 30.4   MCHC      31.5 - 36.5 g/dL 33.2   RDW      10.0 - 15.0 % 13.6   Platelet Count      150 - 450 10e9/L 183       ASSESSMENT/PLAN:  Mixed hyperlipidemia  Called pharmacy to see what they had as an alternative and they knew they had " gummies available.  The dose comes in 222mg and recommendation on bottle was for 2 gummies a day.    1.  Discontinue previous fish oil order  2.  Fish Oil gummies 222mg take 2 chewables po daily.    - Omega-3 Fatty Acids (FISH OIL ADULT GUMMIES) 113.5 MG CHEW; Take 444 mg by mouth daily    Hypertension, goal below 140/90  Her pressures vary but mainly elevated.  No signs of dizziness.    Will change out the metoprolol from long acting to short acting and then at least staff can crush those.      1.  Discontinue metoprolol succinate  2.  Start metoprolol tartrate 100mg twice a day.  - metoprolol tartrate (LOPRESSOR) 100 MG tablet; Take 1 tablet (100 mg) by mouth 2 times daily    Acute embolism and thrombosis of right popliteal vein (H)  Is on the ASA 325mg daily.  No changes.  No complaints of pain or right leg bigger than left.  Will not remove the lipitor or fish oil for hyperlipidemia as well.      Vascular dementia without behavioral disturbance (H)  Continue to monitor behaviors and anticipate needs.  She may be one that needs encouragement to come out of her room after her initial 14 day quarantine during the pandemic.  No concerns noted so far.      Orders written by provider at facility  1.  Discontinue Fish Oil  2.  Start Fish Oil gummies 222mg  2 gummies po daily for hypertricycerides  2.  Discontinue Metoprolol succinate  4.  Start Metoprolol tartrate 100mg twice a day in order to be able to crush    Total time spent with patient visit at the [facility] nursing facility was 15 min including patient visit, review of past records and discussion with pharmacy and nursing. Greater than 50% of total time spent with counseling and coordinating care due to anticipation of proper medication dilvery system .     Electronically signed by:  LEATHA Hagen CNP                 Sincerely,        LEATHA Hagen CNP

## 2021-01-05 NOTE — PROGRESS NOTES
[facility]  [place] Medical Record Number:  [id number]  Place of Service where encounter took place:  [facility] (S) [[id number]]  Chief Complaint   Patient presents with     Nursing Home Acute       HPI:    Loni Ybarra  is a 80 year old (1940), who is being seen today for an episodic care visit.  HPI information obtained from: facility chart records, facility staff and patient report. Today's concern is:    1. Mixed hyperlipidemia    2. Hypertension, goal below 140/90    3. Acute embolism and thrombosis of right popliteal vein (H)    4. Vascular dementia without behavioral disturbance (H)      Nursing asked this NP to review medications today as the med passers are finding that Glendy is chewing medications that are long acting like the Metoprolol and then her fish oil caps that were sent over.    Glendy also had a repeat doppler done of the right lower leg to check status of blood clot seen prior to admission.    Past Medical and Surgical History reviewed in Epic today.    MEDICATIONS:    Current Outpatient Medications   Medication Sig Dispense Refill     metoprolol tartrate (LOPRESSOR) 100 MG tablet Take 1 tablet (100 mg) by mouth 2 times daily       Omega-3 Fatty Acids (FISH OIL ADULT GUMMIES) 113.5 MG CHEW Take 444 mg by mouth daily       amLODIPine (NORVASC) 5 MG tablet Take 1 tablet (5 mg) by mouth daily 30 tablet 0     aspirin (ASA) 325 MG tablet Take 1 tablet (325 mg) by mouth daily 30 tablet 0     atorvastatin (LIPITOR) 40 MG tablet Take 1 tablet (40 mg) by mouth daily 30 tablet 0     Bioflavonoid Products (VITAMIN C) CHEW Take 1 chew tab by mouth daily 30 tablet 0     chlorhexidine (PERIDEX) 0.12 % solution Swish and spit 15 mLs in mouth 2 times daily 118 mL 0     losartan (COZAAR) 100 MG tablet Take 1 tablet (100 mg) by mouth daily 30 tablet 0     nitroGLYcerin (NITROSTAT) 0.4 MG sublingual tablet Place 1 tablet (0.4 mg) under the tongue every 5 minutes as  "needed for chest pain 10 tablet 0     Vitamin E 100 units TABS Take 100 Units by mouth daily 30 tablet 0         REVIEW OF SYSTEMS:  Limited secondary to cognitive impairment but today pt reports no pain, no shortness of breath that is acute, no abdominal tenderness    Objective:  BP (!) 170/71   Pulse 66   Temp 97.7  F (36.5  C)   Resp 20   Ht 1.626 m (5' 4\")   Wt 81.2 kg (179 lb)   LMP  (LMP Unknown)   SpO2 94%   BMI 30.73 kg/m    Exam:  Found Glendy sitting in her room in the wheelchair.  Not really doing anything or engaging in anything around her.  Smiles and seems happy.  Coloring is pink, warm and dry.  Heart rate is regular and strong.  Lungs are clear.  No use of oxygen.      Blood pressures vary  110-190's/50-90's.      Doppler findings of right lower leg - some thickening of walls lesser saphenous vein from popliteal fossa through distal calf as showing possible past clot.    Labs:   Component      Latest Ref Rng & Units 12/29/2020   Sodium      133 - 144 mmol/L 142   Potassium      3.4 - 5.3 mmol/L 3.6   Chloride      94 - 109 mmol/L 110 (H)   Carbon Dioxide      20 - 32 mmol/L 27   Anion Gap      3 - 14 mmol/L 5   Glucose      70 - 99 mg/dL 137 (H)   Urea Nitrogen      7 - 30 mg/dL 12   Creatinine      0.52 - 1.04 mg/dL 0.88   GFR Estimate      >60 mL/min/1.73:m2 62   GFR Estimate If Black      >60 mL/min/1.73:m2 71   Calcium      8.5 - 10.1 mg/dL 10.5 (H)   WBC      4.0 - 11.0 10e9/L 9.0   RBC Count      3.8 - 5.2 10e12/L 4.18   Hemoglobin      11.7 - 15.7 g/dL 12.7   Hematocrit      35.0 - 47.0 % 38.3   MCV      78 - 100 fl 92   MCH      26.5 - 33.0 pg 30.4   MCHC      31.5 - 36.5 g/dL 33.2   RDW      10.0 - 15.0 % 13.6   Platelet Count      150 - 450 10e9/L 183       ASSESSMENT/PLAN:  Mixed hyperlipidemia  Called pharmacy to see what they had as an alternative and they knew they had gummies available.  The dose comes in 222mg and recommendation on bottle was for 2 gummies a day.    1.  " Discontinue previous fish oil order  2.  Fish Oil gummies 222mg take 2 chewables po daily.    - Omega-3 Fatty Acids (FISH OIL ADULT GUMMIES) 113.5 MG CHEW; Take 444 mg by mouth daily    Hypertension, goal below 140/90  Her pressures vary but mainly elevated.  No signs of dizziness.    Will change out the metoprolol from long acting to short acting and then at least staff can crush those.      1.  Discontinue metoprolol succinate  2.  Start metoprolol tartrate 100mg twice a day.  - metoprolol tartrate (LOPRESSOR) 100 MG tablet; Take 1 tablet (100 mg) by mouth 2 times daily    Acute embolism and thrombosis of right popliteal vein (H)  Is on the ASA 325mg daily.  No changes.  No complaints of pain or right leg bigger than left.  Will not remove the lipitor or fish oil for hyperlipidemia as well.      Vascular dementia without behavioral disturbance (H)  Continue to monitor behaviors and anticipate needs.  She may be one that needs encouragement to come out of her room after her initial 14 day quarantine during the pandemic.  No concerns noted so far.      Orders written by provider at facility  1.  Discontinue Fish Oil  2.  Start Fish Oil gummies 222mg  2 gummies po daily for hypertricycerides  2.  Discontinue Metoprolol succinate  4.  Start Metoprolol tartrate 100mg twice a day in order to be able to crush    Total time spent with patient visit at the skilled nursing facility was 15 min including patient visit, review of past records and discussion with pharmacy and nursing. Greater than 50% of total time spent with counseling and coordinating care due to anticipation of proper medication dilvery system .     Electronically signed by:  LEATHA Hagen CNP

## 2021-01-08 ENCOUNTER — NURSING HOME VISIT (OUTPATIENT)
Dept: GERIATRICS | Facility: CLINIC | Age: 81
End: 2021-01-08
Payer: MEDICARE

## 2021-01-08 VITALS
DIASTOLIC BLOOD PRESSURE: 64 MMHG | TEMPERATURE: 97.4 F | SYSTOLIC BLOOD PRESSURE: 146 MMHG | OXYGEN SATURATION: 94 % | RESPIRATION RATE: 18 BRPM | BODY MASS INDEX: 29.82 KG/M2 | HEIGHT: 65 IN | HEART RATE: 64 BPM | WEIGHT: 179 LBS

## 2021-01-08 DIAGNOSIS — N93.9 VAGINAL BLEEDING: ICD-10-CM

## 2021-01-08 DIAGNOSIS — E78.2 MIXED HYPERLIPIDEMIA: ICD-10-CM

## 2021-01-08 DIAGNOSIS — F01.50 VASCULAR DEMENTIA WITHOUT BEHAVIORAL DISTURBANCE (H): ICD-10-CM

## 2021-01-08 DIAGNOSIS — E11.22 TYPE 2 DIABETES MELLITUS WITH STAGE 3B CHRONIC KIDNEY DISEASE, WITHOUT LONG-TERM CURRENT USE OF INSULIN (H): Primary | ICD-10-CM

## 2021-01-08 DIAGNOSIS — N18.32 TYPE 2 DIABETES MELLITUS WITH STAGE 3B CHRONIC KIDNEY DISEASE, WITHOUT LONG-TERM CURRENT USE OF INSULIN (H): Primary | ICD-10-CM

## 2021-01-08 PROCEDURE — 99309 SBSQ NF CARE MODERATE MDM 30: CPT | Performed by: NURSE PRACTITIONER

## 2021-01-08 ASSESSMENT — MIFFLIN-ST. JEOR: SCORE: 1277.82

## 2021-01-08 NOTE — LETTER
1/8/2021        RE: Loni Ybarra  [address] [facility] [address]        [place] [facility]  [place] Medical Record Number:  [id number]  Place of Service where encounter took place:  [facility] ([facility] [[id number]]  Chief Complaint   Patient presents with     Nursing Home Acute       HPI:    Loni Ybarra  is a 80 year old (1940), who is being seen today for an episodic care visit.  HPI information obtained from: facility chart records, facility staff, patient report and [facility] chart review. Today's concern is:    1. Type 2 diabetes mellitus with stage 3b chronic kidney disease, without long-term current use of insulin (H)    2. Mixed hyperlipidemia    3. Vaginal bleeding    4. Vascular dementia without behavioral disturbance (H)      Staff noted Glendy had some vaginal bleeding seen on 1/1/21 but not since it was seen once.  She told staff that she has had hemorrhoids in the past.  Today Glendy denied any bleeding seen when she has gone to the bathroom.    Glendy also has QID sugar checks but is not on any medication.  Came to see her sugars as well today.    Glendy asked when she was going to be able to return home.  Right now not sure what will happen as therapies working with her for strengthening.  Family is to looking into memory care at a AL facility.    Past Medical and Surgical History reviewed in Epic today.    MEDICATIONS:    Current Outpatient Medications   Medication Sig Dispense Refill     amLODIPine (NORVASC) 5 MG tablet Take 1 tablet (5 mg) by mouth daily 30 tablet 0     aspirin (ASA) 325 MG tablet Take 1 tablet (325 mg) by mouth daily 30 tablet 0     atorvastatin (LIPITOR) 40 MG tablet Take 1 tablet (40 mg) by mouth daily 30 tablet 0     Bioflavonoid Products (VITAMIN C) CHEW Take 1 chew tab by mouth daily 30 tablet 0     chlorhexidine (PERIDEX) 0.12 % solution Swish and spit 15 mLs in mouth 2 times daily 118 mL 0     losartan (COZAAR)  "100 MG tablet Take 1 tablet (100 mg) by mouth daily 30 tablet 0     metoprolol succinate ER (TOPROL-XL) 100 MG 24 hr tablet Take 2 tablets (200 mg) by mouth daily 30 tablet 0     nitroGLYcerin (NITROSTAT) 0.4 MG sublingual tablet Place 1 tablet (0.4 mg) under the tongue every 5 minutes as needed for chest pain 10 tablet 0     Omega-3 Fatty Acids (FISH OIL ADULT GUMMIES PO)        Vitamin E 100 units TABS Take 100 Units by mouth daily 30 tablet 0       REVIEW OF SYSTEMS:  4 point ROS including Respiratory, CV, GI and , other than that noted in the HPI,  is negative    Objective:  BP (!) 146/64   Pulse 64   Temp 97.4  F (36.3  C)   Resp 18   Ht 1.651 m (5' 5\")   Wt 81.2 kg (179 lb)   LMP  (LMP Unknown)   SpO2 94%   BMI 29.79 kg/m    Exam:  Sitting in her wheelchair in her room facing the wall.  Alert and jovial in spirit.  Can see her short term memory is minimal but comments she feels more of her mentation coming back.  Does not really remember what occurred for going to the hospital.  Heart rate regular and strong.  Lungs are clear.  No oxygen.  Good expansion.  Abdomen is round and soft.      Blood sugars QID  7am = 120-150's  12N = 190-230's  5pm = 150-240's  HS = 130-250's    Labs:   Lab Results   Component Value Date    A1C 6.1 12/27/2020     Lab Results   Component Value Date    CHOL 165 09/08/2020     Lab Results   Component Value Date    HDL 62 09/08/2020     Lab Results   Component Value Date    LDL 70 09/08/2020     Lab Results   Component Value Date    TRIG 164 09/08/2020     Lab Results   Component Value Date    CHOLHDLRATIO 2.7 06/04/2015         ASSESSMENT/PLAN:  Type 2 diabetes mellitus with stage 3b chronic kidney disease, without long-term current use of insulin (H)  No medications.  Diabetic diet provided.  No need to be checking sugars but monitor through checking the A1c.  Since recently done, will order for A1c to be done every 3 months.    Mixed hyperlipidemia  Is on Lipitor 40mg daily " and Fish Oil gummies.  Will put an order in that Lipid panel to be done every 6 months.  No changes with her cholesterol medications.    Vaginal bleeding  Continue to monitor to see if occurs anymore.  Communication was poor as not told or noted until a week later.  Appeared to be only a once time occurrence.      Vascular dementia without behavioral disturbance (H)  Continue with therapies and if able to rehab and qualify for memory care in a AL facility, then it would be great for her to have a less restrictive environment.  No behavior issues.        Orders written by provider at facility  1.  Discontinue blood sugars  2.  A1c to be done every 3 months - type 2 DM (due 3/29/21)  3.  Lipid panel every 6 months - due in March '21    Total time spent with patient visit at the skilled nursing facility was 15 min including patient visit and review of past records. Greater than 50% of total time spent with counseling and coordinating care due to discussion with nursing about certain treatments being done and if appropriate.     Electronically signed by:  LEATHA Hagen CNP                 Sincerely,        LEATHA Hagen CNP

## 2021-01-08 NOTE — PROGRESS NOTES
[place] [facility]  [place] Medical Record Number:  [id number]  Place of Service where encounter took place:  [facility] ([facility] [[id number]]  Chief Complaint   Patient presents with     Nursing Home Acute       HPI:    Loni Ybarra  is a 80 year old (1940), who is being seen today for an episodic care visit.  HPI information obtained from: facility chart records, facility staff, patient report and [facility] chart review. Today's concern is:    1. Type 2 diabetes mellitus with stage 3b chronic kidney disease, without long-term current use of insulin (H)    2. Mixed hyperlipidemia    3. Vaginal bleeding    4. Vascular dementia without behavioral disturbance (H)      Staff noted Glendy had some vaginal bleeding seen on 1/1/21 but not since it was seen once.  She told staff that she has had hemorrhoids in the past.  Today Glendy denied any bleeding seen when she has gone to the bathroom.    Glendy also has QID sugar checks but is not on any medication.  Came to see her sugars as well today.    Glendy asked when she was going to be able to return home.  Right now not sure what will happen as therapies working with her for strengthening.  Family is to looking into memory care at a AL facility.    Past Medical and Surgical History reviewed in Epic today.    MEDICATIONS:    Current Outpatient Medications   Medication Sig Dispense Refill     amLODIPine (NORVASC) 5 MG tablet Take 1 tablet (5 mg) by mouth daily 30 tablet 0     aspirin (ASA) 325 MG tablet Take 1 tablet (325 mg) by mouth daily 30 tablet 0     atorvastatin (LIPITOR) 40 MG tablet Take 1 tablet (40 mg) by mouth daily 30 tablet 0     Bioflavonoid Products (VITAMIN C) CHEW Take 1 chew tab by mouth daily 30 tablet 0     chlorhexidine (PERIDEX) 0.12 % solution Swish and spit 15 mLs in mouth 2 times daily 118 mL 0     losartan (COZAAR) 100 MG tablet Take 1 tablet (100 mg) by mouth daily 30 tablet 0     metoprolol succinate ER  "(TOPROL-XL) 100 MG 24 hr tablet Take 2 tablets (200 mg) by mouth daily 30 tablet 0     nitroGLYcerin (NITROSTAT) 0.4 MG sublingual tablet Place 1 tablet (0.4 mg) under the tongue every 5 minutes as needed for chest pain 10 tablet 0     Omega-3 Fatty Acids (FISH OIL ADULT GUMMIES PO)        Vitamin E 100 units TABS Take 100 Units by mouth daily 30 tablet 0       REVIEW OF SYSTEMS:  4 point ROS including Respiratory, CV, GI and , other than that noted in the HPI,  is negative    Objective:  BP (!) 146/64   Pulse 64   Temp 97.4  F (36.3  C)   Resp 18   Ht 1.651 m (5' 5\")   Wt 81.2 kg (179 lb)   LMP  (LMP Unknown)   SpO2 94%   BMI 29.79 kg/m    Exam:  Sitting in her wheelchair in her room facing the wall.  Alert and jovial in spirit.  Can see her short term memory is minimal but comments she feels more of her mentation coming back.  Does not really remember what occurred for going to the hospital.  Heart rate regular and strong.  Lungs are clear.  No oxygen.  Good expansion.  Abdomen is round and soft.      Blood sugars QID  7am = 120-150's  12N = 190-230's  5pm = 150-240's  HS = 130-250's    Labs:   Lab Results   Component Value Date    A1C 6.1 12/27/2020     Lab Results   Component Value Date    CHOL 165 09/08/2020     Lab Results   Component Value Date    HDL 62 09/08/2020     Lab Results   Component Value Date    LDL 70 09/08/2020     Lab Results   Component Value Date    TRIG 164 09/08/2020     Lab Results   Component Value Date    CHOLHDLRATIO 2.7 06/04/2015         ASSESSMENT/PLAN:  Type 2 diabetes mellitus with stage 3b chronic kidney disease, without long-term current use of insulin (H)  No medications.  Diabetic diet provided.  No need to be checking sugars but monitor through checking the A1c.  Since recently done, will order for A1c to be done every 3 months.    Mixed hyperlipidemia  Is on Lipitor 40mg daily and Fish Oil gummies.  Will put an order in that Lipid panel to be done every 6 months.  No " changes with her cholesterol medications.    Vaginal bleeding  Continue to monitor to see if occurs anymore.  Communication was poor as not told or noted until a week later.  Appeared to be only a once time occurrence.      Vascular dementia without behavioral disturbance (H)  Continue with therapies and if able to rehab and qualify for memory care in a AL facility, then it would be great for her to have a less restrictive environment.  No behavior issues.        Orders written by provider at facility  1.  Discontinue blood sugars  2.  A1c to be done every 3 months - type 2 DM (due 3/29/21)  3.  Lipid panel every 6 months - due in March '21    Total time spent with patient visit at the skilled nursing facility was 15 min including patient visit and review of past records. Greater than 50% of total time spent with counseling and coordinating care due to discussion with nursing about certain treatments being done and if appropriate.     Electronically signed by:  LEATHA Hagen CNP

## 2021-01-13 RX ORDER — ASCORBIC ACID 125 MG
444 TABLET,CHEWABLE ORAL DAILY
Start: 2021-01-13 | End: 2022-01-01

## 2021-01-13 RX ORDER — METOPROLOL TARTRATE 100 MG
100 TABLET ORAL 2 TIMES DAILY
Start: 2021-01-13 | End: 2022-01-01

## 2021-01-14 ENCOUNTER — VIRTUAL VISIT (OUTPATIENT)
Dept: GERIATRICS | Facility: CLINIC | Age: 81
End: 2021-01-14
Payer: MEDICARE

## 2021-01-14 VITALS
BODY MASS INDEX: 29.82 KG/M2 | TEMPERATURE: 97.2 F | OXYGEN SATURATION: 94 % | HEART RATE: 63 BPM | DIASTOLIC BLOOD PRESSURE: 80 MMHG | HEIGHT: 65 IN | SYSTOLIC BLOOD PRESSURE: 129 MMHG | RESPIRATION RATE: 20 BRPM | WEIGHT: 179 LBS

## 2021-01-14 DIAGNOSIS — M62.81 GENERALIZED MUSCLE WEAKNESS: ICD-10-CM

## 2021-01-14 DIAGNOSIS — R60.0 LOWER EXTREMITY EDEMA: ICD-10-CM

## 2021-01-14 DIAGNOSIS — E78.2 MIXED HYPERLIPIDEMIA: ICD-10-CM

## 2021-01-14 DIAGNOSIS — I25.10 CORONARY ARTERY DISEASE, ANGINA PRESENCE UNSPECIFIED, UNSPECIFIED VESSEL OR LESION TYPE, UNSPECIFIED WHETHER NATIVE OR TRANSPLANTED HEART: Primary | ICD-10-CM

## 2021-01-14 DIAGNOSIS — F02.80 LATE ONSET ALZHEIMER'S DISEASE WITHOUT BEHAVIORAL DISTURBANCE (H): ICD-10-CM

## 2021-01-14 DIAGNOSIS — D75.0 ESSENTIAL HYPERERYTHROPOIETINEMIA: ICD-10-CM

## 2021-01-14 DIAGNOSIS — N18.32 STAGE 3B CHRONIC KIDNEY DISEASE (H): ICD-10-CM

## 2021-01-14 DIAGNOSIS — G30.1 LATE ONSET ALZHEIMER'S DISEASE WITHOUT BEHAVIORAL DISTURBANCE (H): ICD-10-CM

## 2021-01-14 DIAGNOSIS — Z86.718 PERSONAL HISTORY OF DVT (DEEP VEIN THROMBOSIS): ICD-10-CM

## 2021-01-14 DIAGNOSIS — Z91.89 AT RISK FOR MALNUTRITION: ICD-10-CM

## 2021-01-14 PROCEDURE — 99305 1ST NF CARE MODERATE MDM 35: CPT | Mod: 95 | Performed by: FAMILY MEDICINE

## 2021-01-14 ASSESSMENT — MIFFLIN-ST. JEOR: SCORE: 1277.82
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
## 2021-01-17 ENCOUNTER — TELEPHONE (OUTPATIENT)
Dept: FAMILY MEDICINE | Facility: OTHER | Age: 81
End: 2021-01-17

## 2021-01-18 NOTE — TELEPHONE ENCOUNTER
Patient with dementia and newly in nursing home.  Will forward to GNP to know of patient's call and concerns.

## 2021-01-18 NOTE — TELEPHONE ENCOUNTER
Reason for call:  Other   Patient called regarding (reason for call):   call back    Additional comments:   Patient called asking why and how long she will be at her location.  Patient didn't seem too clear as to where she was. She did quote this name and then the address of [address] [place] [address] and [facility].  This phone call was a little puzzling and confusing.     Phone number to reach patient:  Cell number on file:    Telephone Information:   Mobile [phone]       Best Time:  any    Can we leave a detailed message on this number?  YES    Travel screening: Not Applicable

## 2021-01-26 ENCOUNTER — DISCHARGE SUMMARY NURSING HOME (OUTPATIENT)
Dept: GERIATRICS | Facility: CLINIC | Age: 81
End: 2021-01-26
Payer: MEDICARE

## 2021-01-26 VITALS
OXYGEN SATURATION: 98 % | HEIGHT: 65 IN | DIASTOLIC BLOOD PRESSURE: 62 MMHG | HEART RATE: 60 BPM | RESPIRATION RATE: 18 BRPM | SYSTOLIC BLOOD PRESSURE: 161 MMHG | TEMPERATURE: 97.6 F | BODY MASS INDEX: 29.62 KG/M2 | WEIGHT: 177.8 LBS

## 2021-01-26 DIAGNOSIS — F01.50 VASCULAR DEMENTIA WITHOUT BEHAVIORAL DISTURBANCE (H): ICD-10-CM

## 2021-01-26 DIAGNOSIS — E78.2 MIXED HYPERLIPIDEMIA: ICD-10-CM

## 2021-01-26 DIAGNOSIS — I25.10 CORONARY ARTERY DISEASE, ANGINA PRESENCE UNSPECIFIED, UNSPECIFIED VESSEL OR LESION TYPE, UNSPECIFIED WHETHER NATIVE OR TRANSPLANTED HEART: Primary | ICD-10-CM

## 2021-01-26 DIAGNOSIS — E11.22 TYPE 2 DIABETES MELLITUS WITH STAGE 3B CHRONIC KIDNEY DISEASE, WITHOUT LONG-TERM CURRENT USE OF INSULIN (H): ICD-10-CM

## 2021-01-26 DIAGNOSIS — I10 HYPERTENSION, GOAL BELOW 140/90: ICD-10-CM

## 2021-01-26 DIAGNOSIS — Z91.89 AT RISK FOR MALNUTRITION: ICD-10-CM

## 2021-01-26 DIAGNOSIS — N18.32 TYPE 2 DIABETES MELLITUS WITH STAGE 3B CHRONIC KIDNEY DISEASE, WITHOUT LONG-TERM CURRENT USE OF INSULIN (H): ICD-10-CM

## 2021-01-26 DIAGNOSIS — Z86.718 PERSONAL HISTORY OF DVT (DEEP VEIN THROMBOSIS): ICD-10-CM

## 2021-01-26 DIAGNOSIS — N18.32 STAGE 3B CHRONIC KIDNEY DISEASE (H): ICD-10-CM

## 2021-01-26 PROBLEM — I82.409 DVT (DEEP VENOUS THROMBOSIS) (H): Status: RESOLVED | Noted: 2020-12-28 | Resolved: 2021-01-26

## 2021-01-26 PROCEDURE — 99316 NF DSCHRG MGMT 30 MIN+: CPT | Performed by: NURSE PRACTITIONER

## 2021-01-26 ASSESSMENT — MIFFLIN-ST. JEOR: SCORE: 1272.38
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
## 2021-01-28 ENCOUNTER — TELEPHONE (OUTPATIENT)
Dept: GERIATRICS | Facility: CLINIC | Age: 81
End: 2021-01-28

## 2021-01-28 NOTE — TELEPHONE ENCOUNTER
Reason for follow up call: Loni Ybarra appeared on our list for being seen in and recenlty discharge from the Emergency Room/In Patient Hospital Admission.    Chief Complaint   Patient presents with     Hospital F/U     Coronary Artery Disease, Angina Presence Unspecified, Unspecified Vessel Or Lesion Type, Unspecified Whether Native       Encounter routed for Clinic Triage RN to call for follow up    ES PritchettN, RN, PHN  [facility]  January 28, 2021

## 2021-01-28 NOTE — TELEPHONE ENCOUNTER
ED / Discharge Outreach Protocol    Patient Contact    Attempt # 1    Was call answered?  No.  Unable to leave message.    Has upcoming appointment :   2/2/2021 8:00 AM Rosaura Dunbar APRN CNP

## 2021-01-29 NOTE — TELEPHONE ENCOUNTER
ED / Discharge Outreach Protocol    Patient Contact    Attempt # 2    Was call answered?  No.  Left message on voicemail with information to call me back.  Patient is living at [facility]. Writer left a message for the nurse to call back and provider the team with an update. After we get the update please route encounter to patient's PCP.     Hannah Mancia RN, BSN  [place] [facility]  January 29, 2021

## 2021-01-29 NOTE — TELEPHONE ENCOUNTER
Mary Batista called back with an update regarding the patient. Mary states that the pateint came there on 1/27/21 from [facility] in [place]. The patient is doing well and is content. Currently she is in memory care at [facility].     At the time the patient does not need anything.     Hannah Mancia RN, BSN  [place] [facility]  January 29, 2021

## 2021-02-02 ENCOUNTER — ASSISTED LIVING VISIT (OUTPATIENT)
Dept: GERIATRICS | Facility: CLINIC | Age: 81
End: 2021-02-02
Payer: MEDICARE

## 2021-02-02 VITALS
HEART RATE: 58 BPM | WEIGHT: 175.6 LBS | BODY MASS INDEX: 29.22 KG/M2 | SYSTOLIC BLOOD PRESSURE: 135 MMHG | DIASTOLIC BLOOD PRESSURE: 66 MMHG | OXYGEN SATURATION: 96 % | RESPIRATION RATE: 18 BRPM | TEMPERATURE: 97.4 F

## 2021-02-02 DIAGNOSIS — Z86.718 PERSONAL HISTORY OF DVT (DEEP VEIN THROMBOSIS): ICD-10-CM

## 2021-02-02 DIAGNOSIS — N18.32 TYPE 2 DIABETES MELLITUS WITH STAGE 3B CHRONIC KIDNEY DISEASE, WITHOUT LONG-TERM CURRENT USE OF INSULIN (H): ICD-10-CM

## 2021-02-02 DIAGNOSIS — E11.22 TYPE 2 DIABETES MELLITUS WITH STAGE 3B CHRONIC KIDNEY DISEASE, WITHOUT LONG-TERM CURRENT USE OF INSULIN (H): ICD-10-CM

## 2021-02-02 DIAGNOSIS — E78.2 MIXED HYPERLIPIDEMIA: ICD-10-CM

## 2021-02-02 DIAGNOSIS — F01.50 VASCULAR DEMENTIA WITHOUT BEHAVIORAL DISTURBANCE (H): Primary | ICD-10-CM

## 2021-02-02 DIAGNOSIS — I10 HYPERTENSION, GOAL BELOW 140/90: ICD-10-CM

## 2021-02-02 DIAGNOSIS — I25.10 CORONARY ARTERY DISEASE, ANGINA PRESENCE UNSPECIFIED, UNSPECIFIED VESSEL OR LESION TYPE, UNSPECIFIED WHETHER NATIVE OR TRANSPLANTED HEART: ICD-10-CM

## 2021-02-02 DIAGNOSIS — N18.32 STAGE 3B CHRONIC KIDNEY DISEASE (H): ICD-10-CM

## 2021-02-02 RX ORDER — ACETAMINOPHEN 500 MG
500 TABLET ORAL EVERY 4 HOURS PRN
COMMUNITY
End: 2022-01-01
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
## 2021-03-16 ENCOUNTER — ASSISTED LIVING VISIT (OUTPATIENT)
Dept: GERIATRICS | Facility: CLINIC | Age: 81
End: 2021-03-16
Payer: MEDICARE

## 2021-03-16 DIAGNOSIS — F01.50 VASCULAR DEMENTIA WITHOUT BEHAVIORAL DISTURBANCE (H): ICD-10-CM

## 2021-03-16 DIAGNOSIS — I10 HYPERTENSION, GOAL BELOW 140/90: ICD-10-CM

## 2021-03-16 DIAGNOSIS — I25.10 CORONARY ARTERY DISEASE, ANGINA PRESENCE UNSPECIFIED, UNSPECIFIED VESSEL OR LESION TYPE, UNSPECIFIED WHETHER NATIVE OR TRANSPLANTED HEART: Primary | ICD-10-CM

## 2021-03-16 DIAGNOSIS — E78.2 MIXED HYPERLIPIDEMIA: ICD-10-CM

## 2021-03-16 NOTE — LETTER
3/16/2021        RE: Loni Ybarra  [facility]  [address]  [address]  [place] [address]        [place] [facility]  [place] Medical Record Number:  [id number]  Place of Service where encounter took place:  [facility] ([facility]) [[id number]]  Chief Complaint   Patient presents with     RECHECK       HPI:    Loni Ybarra  is a 80 year old (1940), who is being seen today for an episodic care visit.  HPI information obtained from: facility chart records, facility staff and patient report. Today's concern is:    1. Coronary artery disease, angina presence unspecified, unspecified vessel or lesion type, unspecified whether native or transplanted heart    2. Mixed hyperlipidemia    3. Hypertension, goal below 140/90    4. Vascular dementia without behavioral disturbance (H)      Staff stated that Glendy wanted to see the MD as she has not seen one since coming to live at [facility].  This NP took care of her on the memory care unit at [facility] and so re-introduced self and she remembers her time on the secured unit.    Glendy continues to be full of smiles and laughs.  She did not really have much to complain about.  Wanted to know who her doctor/provider was.      Past Medical and Surgical History reviewed in Epic today.    MEDICATIONS:    Current Outpatient Medications   Medication Sig Dispense Refill     acetaminophen (TYLENOL) 500 MG tablet Take 500 mg by mouth every 4 hours as needed for mild pain       amLODIPine (NORVASC) 5 MG tablet Take 1 tablet (5 mg) by mouth daily 30 tablet 0     aspirin (ASA) 325 MG tablet Take 1 tablet (325 mg) by mouth daily 30 tablet 0     atorvastatin (LIPITOR) 40 MG tablet Take 1 tablet (40 mg) by mouth daily 30 tablet 0     Bioflavonoid Products (VITAMIN C) CHEW Take 1 chew tab by mouth daily 30 tablet 0     chlorhexidine (PERIDEX) 0.12 % solution Swish and spit 15 mLs in mouth 2 times daily 118 mL 0     losartan (COZAAR) 100 MG  tablet Take 1 tablet (100 mg) by mouth daily 30 tablet 0     metoprolol tartrate (LOPRESSOR) 100 MG tablet Take 1 tablet (100 mg) by mouth 2 times daily       nitroGLYcerin (NITROSTAT) 0.4 MG sublingual tablet Place 1 tablet (0.4 mg) under the tongue every 5 minutes as needed for chest pain 10 tablet 0     Omega-3 Fatty Acids (FISH OIL ADULT GUMMIES) 113.5 MG CHEW Take 444 mg by mouth daily       Vitamin E 100 units TABS Take 100 Units by mouth daily 30 tablet 0     REVIEW OF SYSTEMS:  4 point ROS including Respiratory, CV, GI and , other than that noted in the HPI,  is negative    Objective:  /74   Pulse 72   Temp 97.5  F (36.4  C)   Resp 16   Wt 82.9 kg (182 lb 12.8 oz)   LMP  (LMP Unknown)   SpO2 96%   BMI 30.42 kg/m    Exam:  GENERAL APPEARANCE:  Alert, in no distress, appears healthy, cooperative  EYES:  EOM normal, conjunctiva and lids normal, wears corrective lenses  RESP:  respiratory effort and palpation of chest normal, lungs clear to auscultation , no respiratory distress  CV:  Palpation and auscultation of heart done , regular rate and rhythm, no murmur, rub, or gallop, trace edema, she is wearing colorful CROCS and liked to show them off  ABDOMEN:  normal bowel sounds, soft, nontender, no hepatosplenomegaly or other masses, no guarding or rebound  M/S:   Gait and station abnormal ambulating with a 4 wheeled walker that is all decorated out.  independently can move around  SKIN:  pink, dry and warm.  no open areas or abnormal growths  PSYCH:  oriented to person with time and place being vague, memory impaired , affect and mood normal, can hold a conversation but can clearly see/hear the cogntive loss    Labs:     Most Recent 3 CBC's:  Recent Labs   Lab Test 12/29/20  0552 12/28/20  0555 12/27/20  0956   WBC 9.0 8.8 8.0   HGB 12.7 13.2 13.2   MCV 92 92 91    191 183     Most Recent 3 BMP's:  Recent Labs   Lab Test 12/29/20  0552 12/28/20  0555 12/27/20  0956    144 143    POTASSIUM 3.6 3.4 3.8   CHLORIDE 110* 109 109   CO2 27 28 29   BUN 12 14 16   CR 0.88 0.92 1.00   ANIONGAP 5 7 5   NANCY 10.5* 9.9 10.3*   * 155* 123*       ASSESSMENT/PLAN:  Coronary artery disease, angina presence unspecified, unspecified vessel or lesion type, unspecified whether native or transplanted heart  Mixed hyperlipidemia  - currently on ASA 325mg po daily, Atorvastatin 40mg po daily and Fish oil gummies.  No changes at this time.  Has a hx of DVT and so will leave the ASA at 325mg.  Risk for falls and bleeding.      Hypertension, goal below 140/90  Recent B/P within limits.  Currently on Losartan 100mg twice a day, Metoprolol 100mg twice a day and amlodipine 5mg daily.    Typically Losartan is a once a day medication.  Will follow up with nursing in regards to twice a day notation.      Vascular dementia without behavioral disturbance (H)  Glendy appears to have made friends in her new environment.  Adjusting well.  Positive personality.  Has Vitamin E and C for vitamin deficiency.    Continue to monitor safety and mobility.        Total time with an established patient visit in the assisted livin minutes including discussions about the POC and care coordination with the patient and staff. Greater than 50% of total time spent with counseling and coordinating care due to assessing her chronic ongoing diseases and review medications.     Electronically signed by:  LEATHA Haegn CNP                 Sincerely,        LEATHA Hagen CNP

## 2021-03-29 ENCOUNTER — MYC MEDICAL ADVICE (OUTPATIENT)
Dept: FAMILY MEDICINE | Facility: CLINIC | Age: 81
End: 2021-03-29

## 2021-03-29 NOTE — LETTER
3/29/2021    Re: Loni Ybarra  1940      To Whom It May Concern:     Loni Ybarra has a diagnosis of vascular dementia due to prior strokes/vascular disease. I do not foresee her memory improving, and may incrementally decline over time.            Michele Morton MD  3/29/2021 3:58 PM

## 2021-03-31 VITALS
RESPIRATION RATE: 16 BRPM | TEMPERATURE: 97.5 F | HEART RATE: 72 BPM | WEIGHT: 182.8 LBS | BODY MASS INDEX: 30.42 KG/M2 | DIASTOLIC BLOOD PRESSURE: 74 MMHG | OXYGEN SATURATION: 96 % | SYSTOLIC BLOOD PRESSURE: 126 MMHG

## 2021-03-31 NOTE — PROGRESS NOTES
[facility]  [place] Medical Record Number:  [id number]  Place of Service where encounter took place:  [facility] ([facility]) [[id number]]  Chief Complaint   Patient presents with     RECHECK       HPI:    Loni Ybarra  is a 80 year old (1940), who is being seen today for an episodic care visit.  HPI information obtained from: facility chart records, facility staff and patient report. Today's concern is:    1. Coronary artery disease, angina presence unspecified, unspecified vessel or lesion type, unspecified whether native or transplanted heart    2. Mixed hyperlipidemia    3. Hypertension, goal below 140/90    4. Vascular dementia without behavioral disturbance (H)      Staff stated that Glendy wanted to see the MD as she has not seen one since coming to live at [facility].  This NP took care of her on the memory care unit at [facility] and so re-introduced self and she remembers her time on the secured unit.    Glendy continues to be full of smiles and laughs.  She did not really have much to complain about.  Wanted to know who her doctor/provider was.      Past Medical and Surgical History reviewed in Epic today.    MEDICATIONS:    Current Outpatient Medications   Medication Sig Dispense Refill     acetaminophen (TYLENOL) 500 MG tablet Take 500 mg by mouth every 4 hours as needed for mild pain       amLODIPine (NORVASC) 5 MG tablet Take 1 tablet (5 mg) by mouth daily 30 tablet 0     aspirin (ASA) 325 MG tablet Take 1 tablet (325 mg) by mouth daily 30 tablet 0     atorvastatin (LIPITOR) 40 MG tablet Take 1 tablet (40 mg) by mouth daily 30 tablet 0     Bioflavonoid Products (VITAMIN C) CHEW Take 1 chew tab by mouth daily 30 tablet 0     chlorhexidine (PERIDEX) 0.12 % solution Swish and spit 15 mLs in mouth 2 times daily 118 mL 0     losartan (COZAAR) 100 MG tablet Take 1 tablet (100 mg) by mouth daily 30 tablet 0     metoprolol tartrate (LOPRESSOR) 100 MG tablet Take 1  tablet (100 mg) by mouth 2 times daily       nitroGLYcerin (NITROSTAT) 0.4 MG sublingual tablet Place 1 tablet (0.4 mg) under the tongue every 5 minutes as needed for chest pain 10 tablet 0     Omega-3 Fatty Acids (FISH OIL ADULT GUMMIES) 113.5 MG CHEW Take 444 mg by mouth daily       Vitamin E 100 units TABS Take 100 Units by mouth daily 30 tablet 0     REVIEW OF SYSTEMS:  4 point ROS including Respiratory, CV, GI and , other than that noted in the HPI,  is negative    Objective:  /74   Pulse 72   Temp 97.5  F (36.4  C)   Resp 16   Wt 82.9 kg (182 lb 12.8 oz)   LMP  (LMP Unknown)   SpO2 96%   BMI 30.42 kg/m    Exam:  GENERAL APPEARANCE:  Alert, in no distress, appears healthy, cooperative  EYES:  EOM normal, conjunctiva and lids normal, wears corrective lenses  RESP:  respiratory effort and palpation of chest normal, lungs clear to auscultation , no respiratory distress  CV:  Palpation and auscultation of heart done , regular rate and rhythm, no murmur, rub, or gallop, trace edema, she is wearing colorful CROCS and liked to show them off  ABDOMEN:  normal bowel sounds, soft, nontender, no hepatosplenomegaly or other masses, no guarding or rebound  M/S:   Gait and station abnormal ambulating with a 4 wheeled walker that is all decorated out.  independently can move around  SKIN:  pink, dry and warm.  no open areas or abnormal growths  PSYCH:  oriented to person with time and place being vague, memory impaired , affect and mood normal, can hold a conversation but can clearly see/hear the cogntive loss    Labs:     Most Recent 3 CBC's:  Recent Labs   Lab Test 12/29/20  0552 12/28/20  0555 12/27/20  0956   WBC 9.0 8.8 8.0   HGB 12.7 13.2 13.2   MCV 92 92 91    191 183     Most Recent 3 BMP's:  Recent Labs   Lab Test 12/29/20  0552 12/28/20  0555 12/27/20  0956    144 143   POTASSIUM 3.6 3.4 3.8   CHLORIDE 110* 109 109   CO2 27 28 29   BUN 12 14 16   CR 0.88 0.92 1.00   ANIONGAP 5 7 5   NANCY  10.5* 9.9 10.3*   * 155* 123*       ASSESSMENT/PLAN:  Coronary artery disease, angina presence unspecified, unspecified vessel or lesion type, unspecified whether native or transplanted heart  Mixed hyperlipidemia  - currently on ASA 325mg po daily, Atorvastatin 40mg po daily and Fish oil gummies.  No changes at this time.  Has a hx of DVT and so will leave the ASA at 325mg.  Risk for falls and bleeding.      Hypertension, goal below 140/90  Recent B/P within limits.  Currently on Losartan 100mg twice a day, Metoprolol 100mg twice a day and amlodipine 5mg daily.    Typically Losartan is a once a day medication.  Will follow up with nursing in regards to twice a day notation.      Vascular dementia without behavioral disturbance (H)  Glendy appears to have made friends in her new environment.  Adjusting well.  Positive personality.  Has Vitamin E and C for vitamin deficiency.    Continue to monitor safety and mobility.        Total time with an established patient visit in the assisted livin minutes including discussions about the POC and care coordination with the patient and staff. Greater than 50% of total time spent with counseling and coordinating care due to assessing her chronic ongoing diseases and review medications.     Electronically signed by:  LEATHA Hagen CNP

## 2021-04-02 ENCOUNTER — MYC MEDICAL ADVICE (OUTPATIENT)
Dept: FAMILY MEDICINE | Facility: OTHER | Age: 81
End: 2021-04-02

## 2021-04-09 ENCOUNTER — MYC MEDICAL ADVICE (OUTPATIENT)
Dept: FAMILY MEDICINE | Facility: OTHER | Age: 81
End: 2021-04-09

## 2021-04-12 ENCOUNTER — MYC MEDICAL ADVICE (OUTPATIENT)
Dept: FAMILY MEDICINE | Facility: OTHER | Age: 81
End: 2021-04-12

## 2021-04-21 NOTE — PROGRESS NOTES
Nurse asked for medication as Glendy is getting herself and other tenants worked up as she thinks she is locked up on the memory care unit.      Gave an order for Lorazepam 0.5mg po every 6 hours prn for anxiety.   checked and no hx of narcotics or sedatives given/recorded    Electronically signed by Jackie Hawkins RN, CNP

## 2021-04-27 NOTE — PROGRESS NOTES
[place] [facility]  [place] Medical Record Number:  [id number]  Place of Service where encounter took place:  [facility] ([facility]) [[id number]]  Chief Complaint   Patient presents with     RECHECK       HPI:    Loni Ybarra  is a 80 year old (1940), who is being seen today for an episodic care visit.  HPI information obtained from: facility chart records, facility staff, patient report and [facility] chart review. Today's concern is:    1. Hypertension, goal below 140/90    2. Reactive depression    3. Vascular dementia without behavioral disturbance (H)      Came to see Glendy today due to changes in mood that staff have been noticing.  Glendy has been more tearful and anxiety as she does not really understand why she is here at [facility].  Her spouse continues to tell her she will not be coming back home and the house was sold.  Staff asking for an antidepressant.    Found Glendy in her apartment napping in her recliner.  Easily woke up to voice.  Was pleasant at first but then became tearful over the way she is felt to be treated by her spouse.  Feel he is frustrated as well and she may not understand the changes he is going through as well.  She did speak about her brother and sister in laws that are watching the spouse and supporting her as well.  Glendy likes it at [facility] and wants to stay here but realizing she does not have a house at this time.      Past Medical and Surgical History reviewed in Epic today.    MEDICATIONS:    Current Outpatient Medications   Medication Sig Dispense Refill     citalopram (CELEXA) 10 MG tablet Take 1 tablet (10 mg) by mouth daily       acetaminophen (TYLENOL) 500 MG tablet Take 500 mg by mouth every 4 hours as needed for mild pain       amLODIPine (NORVASC) 5 MG tablet Take 1 tablet (5 mg) by mouth daily 30 tablet 0     aspirin (ASA) 325 MG tablet Take 1 tablet (325 mg) by mouth daily 30 tablet 0     atorvastatin (LIPITOR) 40 MG tablet  "Take 1 tablet (40 mg) by mouth daily 30 tablet 0     Bioflavonoid Products (VITAMIN C) CHEW Take 1 chew tab by mouth daily 30 tablet 0     chlorhexidine (PERIDEX) 0.12 % solution Swish and spit 15 mLs in mouth 2 times daily 118 mL 0     LORazepam (ATIVAN) 0.5 MG tablet Take 1 tablet (0.5 mg) by mouth every 6 hours as needed for anxiety 10 tablet 0     losartan (COZAAR) 100 MG tablet Take 1 tablet (100 mg) by mouth daily 30 tablet 0     metoprolol tartrate (LOPRESSOR) 100 MG tablet Take 1 tablet (100 mg) by mouth 2 times daily       nitroGLYcerin (NITROSTAT) 0.4 MG sublingual tablet Place 1 tablet (0.4 mg) under the tongue every 5 minutes as needed for chest pain 10 tablet 0     Omega-3 Fatty Acids (FISH OIL ADULT GUMMIES) 113.5 MG CHEW Take 444 mg by mouth daily       Vitamin E 100 units TABS Take 100 Units by mouth daily 30 tablet 0       REVIEW OF SYSTEMS:  4 point ROS including Respiratory, CV, GI and , other than that noted in the HPI,  is negative    Objective:  BP (!) 144/52   Pulse 61   Temp 98  F (36.7  C)   Resp 18   Ht 1.651 m (5' 5\")   Wt 82.8 kg (182 lb 9.6 oz)   LMP  (LMP Unknown)   SpO2 97%   BMI 30.39 kg/m    Exam:  GENERAL APPEARANCE:  Alert, appears healthy, cooperative, tearful  EYES:  EOM normal, conjunctiva and lids normal, wears corrective lenses  RESP:  respiratory effort and palpation of chest normal, lungs clear to auscultation , no respiratory distress  CV:  Palpation and auscultation of heart done , regular rate and rhythm, no murmur, rub, or gallop, trace edema  ABDOMEN:  normal bowel sounds, soft, nontender, no hepatosplenomegaly or other masses  M/S:   Gait and station normal  SKIN:  skin is pink, warm and dry.  no bruising or open areas  PSYCH:  oriented to person with place and time vague, insight and judgement impaired, memory impaired , sad, anxious    Labs:     Most Recent 3 CBC's:  Recent Labs   Lab Test 12/29/20  0552 12/28/20  0555 12/27/20  0956   WBC 9.0 8.8 8.0   HGB " 12.7 13.2 13.2   MCV 92 92 91    191 183     Most Recent 3 BMP's:  Recent Labs   Lab Test 20  0552 20  0555 20  0956    144 143   POTASSIUM 3.6 3.4 3.8   CHLORIDE 110* 109 109   CO2  29   BUN 12 14 16   CR 0.88 0.92 1.00   ANIONGAP 5 7 5   NANCY 10.5* 9.9 10.3*   * 155* 123*       ASSESSMENT/PLAN:  Hypertension, goal below 140/90  Asked staff to pull her recent blood pressures.  Range is from 120-140's/60-70's.    Receives Amlodipine 5mg daily, Losartan 100mg po daily and Metoprolol 100mg twice a day.  1.  B/P's daily for one week and then update NP of readings to get a better idea of the range.      Reactive depression  Vascular dementia without behavioral disturbance (H)  Do feel she is having some reactive depression.  Typical when there are changes and realizing that this will be their forever home.  Spoke to Glendy about reactive depression and trying an antidepressant for some time (like 6 months) to help her get past this grief.  Normally Glendy is a jolly person.  She was open to trying medication and will monitor effectiveness.  She understands that it can help the anxiety feeling she has inside as well.  She likes being in the memory care unit and has made new friends but does miss the old friends too.    - citalopram (CELEXA) 10 MG tablet; Take 1 tablet (10 mg) by mouth daily          Orders written by NP:  1.  B/P's daily for one week and then update NP of readings to get a better idea of the range.    2.  Celexa 10mg po daily for depression    Total time with an established patient visit in the assisted livin minutes including discussions about the POC and care coordination with the patient and facility staff. Greater than 50% of total time spent with counseling and coordinating care due to new acute diagnosis as well as management of HTN.     Electronically signed by:  LEATHA Hagen CNP

## 2021-04-27 NOTE — LETTER
4/27/2021        RE: Loni Ybarra  [facility]  [address]  [address]  [place] [id number]        [place] [facility]  [place] Medical Record Number:  [id number]  Place of Service where encounter took place:  [facility] ([facility]) [[id number]]  Chief Complaint   Patient presents with     RECHECK       HPI:    Loni Ybarra  is a 80 year old (1940), who is being seen today for an episodic care visit.  HPI information obtained from: facility chart records, facility staff, patient report and [facility] chart review. Today's concern is:    1. Hypertension, goal below 140/90    2. Reactive depression    3. Vascular dementia without behavioral disturbance (H)      Came to see Glendy today due to changes in mood that staff have been noticing.  Glendy has been more tearful and anxiety as she does not really understand why she is here at [facility].  Her spouse continues to tell her she will not be coming back home and the house was sold.  Staff asking for an antidepressant.    Found Glendy in her apartment napping in her recliner.  Easily woke up to voice.  Was pleasant at first but then became tearful over the way she is felt to be treated by her spouse.  Feel he is frustrated as well and she may not understand the changes he is going through as well.  She did speak about her brother and sister in laws that are watching the spouse and supporting her as well.  Glendy likes it at [facility] and wants to stay here but realizing she does not have a house at this time.      Past Medical and Surgical History reviewed in Epic today.    MEDICATIONS:    Current Outpatient Medications   Medication Sig Dispense Refill     citalopram (CELEXA) 10 MG tablet Take 1 tablet (10 mg) by mouth daily       acetaminophen (TYLENOL) 500 MG tablet Take 500 mg by mouth every 4 hours as needed for mild pain       amLODIPine (NORVASC) 5 MG tablet Take 1 tablet (5 mg) by mouth daily 30 tablet 0      "aspirin (ASA) 325 MG tablet Take 1 tablet (325 mg) by mouth daily 30 tablet 0     atorvastatin (LIPITOR) 40 MG tablet Take 1 tablet (40 mg) by mouth daily 30 tablet 0     Bioflavonoid Products (VITAMIN C) CHEW Take 1 chew tab by mouth daily 30 tablet 0     chlorhexidine (PERIDEX) 0.12 % solution Swish and spit 15 mLs in mouth 2 times daily 118 mL 0     LORazepam (ATIVAN) 0.5 MG tablet Take 1 tablet (0.5 mg) by mouth every 6 hours as needed for anxiety 10 tablet 0     losartan (COZAAR) 100 MG tablet Take 1 tablet (100 mg) by mouth daily 30 tablet 0     metoprolol tartrate (LOPRESSOR) 100 MG tablet Take 1 tablet (100 mg) by mouth 2 times daily       nitroGLYcerin (NITROSTAT) 0.4 MG sublingual tablet Place 1 tablet (0.4 mg) under the tongue every 5 minutes as needed for chest pain 10 tablet 0     Omega-3 Fatty Acids (FISH OIL ADULT GUMMIES) 113.5 MG CHEW Take 444 mg by mouth daily       Vitamin E 100 units TABS Take 100 Units by mouth daily 30 tablet 0       REVIEW OF SYSTEMS:  4 point ROS including Respiratory, CV, GI and , other than that noted in the HPI,  is negative    Objective:  BP (!) 144/52   Pulse 61   Temp 98  F (36.7  C)   Resp 18   Ht 1.651 m (5' 5\")   Wt 82.8 kg (182 lb 9.6 oz)   LMP  (LMP Unknown)   SpO2 97%   BMI 30.39 kg/m    Exam:  GENERAL APPEARANCE:  Alert, appears healthy, cooperative, tearful  EYES:  EOM normal, conjunctiva and lids normal, wears corrective lenses  RESP:  respiratory effort and palpation of chest normal, lungs clear to auscultation , no respiratory distress  CV:  Palpation and auscultation of heart done , regular rate and rhythm, no murmur, rub, or gallop, trace edema  ABDOMEN:  normal bowel sounds, soft, nontender, no hepatosplenomegaly or other masses  M/S:   Gait and station normal  SKIN:  skin is pink, warm and dry.  no bruising or open areas  PSYCH:  oriented to person with place and time vague, insight and judgement impaired, memory impaired , sad, anxious    Labs: "     Most Recent 3 CBC's:  Recent Labs   Lab Test 20  0552 20  0555 20  0956   WBC 9.0 8.8 8.0   HGB 12.7 13.2 13.2   MCV 92 92 91    191 183     Most Recent 3 BMP's:  Recent Labs   Lab Test 20  0552 20  0555 20  0956    144 143   POTASSIUM 3.6 3.4 3.8   CHLORIDE 110* 109 109   CO2    BUN 12 14 16   CR 0.88 0.92 1.00   ANIONGAP 5 7 5   NANCY 10.5* 9.9 10.3*   * 155* 123*       ASSESSMENT/PLAN:  Hypertension, goal below 140/90  Asked staff to pull her recent blood pressures.  Range is from 120-140's/60-70's.    Receives Amlodipine 5mg daily, Losartan 100mg po daily and Metoprolol 100mg twice a day.  1.  B/P's daily for one week and then update NP of readings to get a better idea of the range.      Reactive depression  Vascular dementia without behavioral disturbance (H)  Do feel she is having some reactive depression.  Typical when there are changes and realizing that this will be their forever home.  Spoke to Glendy about reactive depression and trying an antidepressant for some time (like 6 months) to help her get past this grief.  Normally Glendy is a jolly person.  She was open to trying medication and will monitor effectiveness.  She understands that it can help the anxiety feeling she has inside as well.  She likes being in the memory care unit and has made new friends but does miss the old friends too.    - citalopram (CELEXA) 10 MG tablet; Take 1 tablet (10 mg) by mouth daily          Orders written by NP:  1.  B/P's daily for one week and then update NP of readings to get a better idea of the range.    2.  Celexa 10mg po daily for depression    Total time with an established patient visit in the assisted livin minutes including discussions about the POC and care coordination with the patient and facility staff. Greater than 50% of total time spent with counseling and coordinating care due to new acute diagnosis as well as management of HTN.      Electronically signed by:  LEATHA Hagen CNP                 Sincerely,        LEATHA Hagen CNP

## 2021-05-11 NOTE — PROGRESS NOTES
[place] [facility]  [place] [facility] Record Number:  [id number]  Place of Service where encounter took place:  [facility] ([facility]) [[id number]]  Chief Complaint   Patient presents with     RECHECK       HPI:    Loni Ybarra  is a 80 year old (1940), who is being seen today for an episodic care visit.  HPI information obtained from: facility chart records, facility staff, patient report and [facility] chart review. Today's concern is:    1. Hypertension, goal below 140/90    2. Coronary artery disease, angina presence unspecified, unspecified vessel or lesion type, unspecified whether native or transplanted heart    3. Vascular dementia without behavioral disturbance (H)       Came to see glendy today as staff have noted her B/P's to be on the higher side.  Wanted to review chart and increase her medication today.      Found Glendy coming out of her apartment and she had her walker with her.  Smiling and in a happy mood.  She was going down the [place]omas to participate in an activity.  Had no complaints.  Had started her on a antidepressant due to her mood.  Typically she is in a cheery mood but going through realization she is not leaving here.    Past Medical and Surgical History reviewed in Epic today.    MEDICATIONS:    Current Outpatient Medications   Medication Sig Dispense Refill     amLODIPine (NORVASC) 5 MG tablet Take 2 tablets (10 mg) by mouth daily 30 tablet 0     acetaminophen (TYLENOL) 500 MG tablet Take 500 mg by mouth every 4 hours as needed for mild pain       aspirin (ASA) 325 MG tablet Take 1 tablet (325 mg) by mouth daily 30 tablet 0     atorvastatin (LIPITOR) 40 MG tablet Take 1 tablet (40 mg) by mouth daily 30 tablet 0     Bioflavonoid Products (VITAMIN C) CHEW Take 1 chew tab by mouth daily 30 tablet 0     chlorhexidine (PERIDEX) 0.12 % solution Swish and spit 15 mLs in mouth 2 times daily 118 mL 0     citalopram (CELEXA) 10 MG tablet Take 1 tablet (10 mg) by mouth  daily       LORazepam (ATIVAN) 0.5 MG tablet Take 1 tablet (0.5 mg) by mouth every 6 hours as needed for anxiety 10 tablet 0     losartan (COZAAR) 100 MG tablet Take 1 tablet (100 mg) by mouth daily 30 tablet 0     metoprolol tartrate (LOPRESSOR) 100 MG tablet Take 1 tablet (100 mg) by mouth 2 times daily       nitroGLYcerin (NITROSTAT) 0.4 MG sublingual tablet Place 1 tablet (0.4 mg) under the tongue every 5 minutes as needed for chest pain 10 tablet 0     Omega-3 Fatty Acids (FISH OIL ADULT GUMMIES) 113.5 MG CHEW Take 444 mg by mouth daily       Vitamin E 100 units TABS Take 100 Units by mouth daily 30 tablet 0         REVIEW OF SYSTEMS:  4 point ROS including Respiratory, CV, GI and , other than that noted in the HPI,  is negative    Objective:  BP (!) 150/69   Pulse 72   Temp 97.5  F (36.4  C)   Wt 86.9 kg (191 lb 8 oz)   LMP  (LMP Unknown)   BMI 31.87 kg/m    Exam:  Independently ambulating with her walker.  Neatly groomed and in a great mood.  Skin is pink, warm and dry.  Heart rate regular and strong.  No pitting edema.  Lungs are clear.  No use of oxygen.      Blood pressures are:  195/85, 159/68 and 150/69    Labs:     Most Recent 3 BMP's:  Recent Labs   Lab Test 12/29/20  0552 12/28/20  0555 12/27/20  0956    144 143   POTASSIUM 3.6 3.4 3.8   CHLORIDE 110* 109 109   CO2 27 28 29   BUN 12 14 16   CR 0.88 0.92 1.00   ANIONGAP 5 7 5   NANCY 10.5* 9.9 10.3*   * 155* 123*       ASSESSMENT/PLAN:  Hypertension, goal below 140/90  Coronary artery disease, angina presence unspecified, unspecified vessel or lesion type, unspecified whether native or transplanted heart  Feel she needs better control of her pressures.  Currently taking Norvasc 5mg daily and Cozaar 100mg daily.    Will increase the Norvasc to 10mg daily.  In 7 days would like staff to start daily B/P's for 7 days and then update NP    Remains on ASA 325mg daily.    - amLODIPine (NORVASC) 5 MG tablet; Take 2 tablets (10 mg) by mouth  daily      Vascular dementia without behavioral disturbance (H)  Alert and pleasant to speak with.  Will ask questions frequently.  Is appropriate for the memory care unit as she has friends and participates in activities.      Written by the NP:  1.  Increase Norvasc to 10mg po daily but given in evening starting 5/12/21  2.  In 10 days start daily B/P's for 7 days then update NP for HTN    Total time with an established patient visit in the assisted living: 15 minutes including discussions about the POC and care coordination with the patient and facility staff. Greater than 50% of total time spent with counseling and coordinating care due to elevated blood pressures and ongoing management of chronic health conditions.     Electronically signed by:  LEATHA Hagen CNP

## 2021-05-11 NOTE — LETTER
5/11/2021        RE: Lnoi Corrales  [address]  [address]  [place] [address]        [place] [facility]  [place] [facility] Record Number:  [id number]  Place of Service where encounter took place:  [facility] ([facility]) [[id number]]  Chief Complaint   Patient presents with     RECHECK       HPI:    Loni Ybarra  is a 80 year old (1940), who is being seen today for an episodic care visit.  HPI information obtained from: facility chart records, facility staff, patient report and [facility] chart review. Today's concern is:    1. Hypertension, goal below 140/90    2. Coronary artery disease, angina presence unspecified, unspecified vessel or lesion type, unspecified whether native or transplanted heart    3. Vascular dementia without behavioral disturbance (H)       Came to see glendy today as staff have noted her B/P's to be on the higher side.  Wanted to review chart and increase her medication today.      Found Glendy coming out of her apartment and she had her walker with her.  Smiling and in a happy mood.  She was going down the [place] to participate in an activity.  Had no complaints.  Had started her on a antidepressant due to her mood.  Typically she is in a cheery mood but going through realization she is not leaving here.    Past Medical and Surgical History reviewed in Epic today.    MEDICATIONS:    Current Outpatient Medications   Medication Sig Dispense Refill     amLODIPine (NORVASC) 5 MG tablet Take 2 tablets (10 mg) by mouth daily 30 tablet 0     acetaminophen (TYLENOL) 500 MG tablet Take 500 mg by mouth every 4 hours as needed for mild pain       aspirin (ASA) 325 MG tablet Take 1 tablet (325 mg) by mouth daily 30 tablet 0     atorvastatin (LIPITOR) 40 MG tablet Take 1 tablet (40 mg) by mouth daily 30 tablet 0     Bioflavonoid Products (VITAMIN C) CHEW Take 1 chew tab by mouth daily 30 tablet 0     chlorhexidine (PERIDEX) 0.12 % solution Swish and  spit 15 mLs in mouth 2 times daily 118 mL 0     citalopram (CELEXA) 10 MG tablet Take 1 tablet (10 mg) by mouth daily       LORazepam (ATIVAN) 0.5 MG tablet Take 1 tablet (0.5 mg) by mouth every 6 hours as needed for anxiety 10 tablet 0     losartan (COZAAR) 100 MG tablet Take 1 tablet (100 mg) by mouth daily 30 tablet 0     metoprolol tartrate (LOPRESSOR) 100 MG tablet Take 1 tablet (100 mg) by mouth 2 times daily       nitroGLYcerin (NITROSTAT) 0.4 MG sublingual tablet Place 1 tablet (0.4 mg) under the tongue every 5 minutes as needed for chest pain 10 tablet 0     Omega-3 Fatty Acids (FISH OIL ADULT GUMMIES) 113.5 MG CHEW Take 444 mg by mouth daily       Vitamin E 100 units TABS Take 100 Units by mouth daily 30 tablet 0         REVIEW OF SYSTEMS:  4 point ROS including Respiratory, CV, GI and , other than that noted in the HPI,  is negative    Objective:  BP (!) 150/69   Pulse 72   Temp 97.5  F (36.4  C)   Wt 86.9 kg (191 lb 8 oz)   LMP  (LMP Unknown)   BMI 31.87 kg/m    Exam:  Independently ambulating with her walker.  Neatly groomed and in a great mood.  Skin is pink, warm and dry.  Heart rate regular and strong.  No pitting edema.  Lungs are clear.  No use of oxygen.      Blood pressures are:  195/85, 159/68 and 150/69    Labs:     Most Recent 3 BMP's:  Recent Labs   Lab Test 12/29/20  0552 12/28/20  0555 12/27/20  0956    144 143   POTASSIUM 3.6 3.4 3.8   CHLORIDE 110* 109 109   CO2 27 28 29   BUN 12 14 16   CR 0.88 0.92 1.00   ANIONGAP 5 7 5   NANCY 10.5* 9.9 10.3*   * 155* 123*       ASSESSMENT/PLAN:  Hypertension, goal below 140/90  Coronary artery disease, angina presence unspecified, unspecified vessel or lesion type, unspecified whether native or transplanted heart  Feel she needs better control of her pressures.  Currently taking Norvasc 5mg daily and Cozaar 100mg daily.    Will increase the Norvasc to 10mg daily.  In 7 days would like staff to start daily B/P's for 7 days and then  update NP    Remains on ASA 325mg daily.    - amLODIPine (NORVASC) 5 MG tablet; Take 2 tablets (10 mg) by mouth daily      Vascular dementia without behavioral disturbance (H)  Alert and pleasant to speak with.  Will ask questions frequently.  Is appropriate for the memory care unit as she has friends and participates in activities.      Written by the NP:  1.  Increase Norvasc to 10mg po daily but given in evening starting 5/12/21  2.  In 10 days start daily B/P's for 7 days then update NP for HTN    Total time with an established patient visit in the assisted living: 15 minutes including discussions about the POC and care coordination with the patient and facility staff. Greater than 50% of total time spent with counseling and coordinating care due to elevated blood pressures and ongoing management of chronic health conditions.     Electronically signed by:  LEATHA Hagen CNP                 Sincerely,        LEATHA Hagen CNP

## 2021-06-02 NOTE — LETTER
6/2/2021        RE: Loni Corrales  [address]  [address]  [place] [address]        [place] [facility]  [place] [facility] Record Number:  [id number]  Place of Service where encounter took place:  [facility] ([facility]) [[id number]]  Chief Complaint   Patient presents with     RECHECK       HPI:    Loni Ybarra  is a 80 year old (1940), who is being seen today for an episodic care visit.  HPI information obtained from: facility chart records, facility staff, patient report and [facility] chart review. Today's concern is:    1. Lower extremity edema    2. Hypertension, goal below 140/90    3. Coronary artery disease, angina presence unspecified, unspecified vessel or lesion type, unspecified whether native or transplanted heart    4. Vascular dementia without behavioral disturbance (H)      Came to see Glendy today as to follow up with her increase in the Norvasc to 10mg daily but now most recently started her on FRANCES stockings due to +2 edema seen by staff.  Most likely due to the increase in medication and Glendy does often change positions but could be from dependency of legs.    Glendy has been all over the place today.  Saw her outside with staff and her peers and then she went to another activity.  Glendy does not isolate herself.  Typically she is a happy person who loves to socialize.      Finally catching in her room walking with her walker.  No real complaints.  In process of getting FRANCES stockings as this NP ordered them via the bridge on 5/31/21    Past Medical and Surgical History reviewed in Epic today.    MEDICATIONS:    Current Outpatient Medications   Medication Sig Dispense Refill     acetaminophen (TYLENOL) 500 MG tablet Take 500 mg by mouth every 4 hours as needed for mild pain       amLODIPine (NORVASC) 5 MG tablet Take 2 tablets (10 mg) by mouth daily 30 tablet 0     aspirin (ASA) 325 MG tablet Take 1 tablet (325 mg) by mouth daily 30 tablet 0      "atorvastatin (LIPITOR) 40 MG tablet Take 1 tablet (40 mg) by mouth daily 30 tablet 0     Bioflavonoid Products (VITAMIN C) CHEW Take 1 chew tab by mouth daily 30 tablet 0     chlorhexidine (PERIDEX) 0.12 % solution Swish and spit 15 mLs in mouth 2 times daily 118 mL 0     citalopram (CELEXA) 10 MG tablet Take 1 tablet (10 mg) by mouth daily       LORazepam (ATIVAN) 0.5 MG tablet Take 1 tablet (0.5 mg) by mouth every 6 hours as needed for anxiety 10 tablet 0     losartan (COZAAR) 100 MG tablet Take 1 tablet (100 mg) by mouth daily 30 tablet 0     metoprolol tartrate (LOPRESSOR) 100 MG tablet Take 1 tablet (100 mg) by mouth 2 times daily       nitroGLYcerin (NITROSTAT) 0.4 MG sublingual tablet Place 1 tablet (0.4 mg) under the tongue every 5 minutes as needed for chest pain 10 tablet 0     Omega-3 Fatty Acids (FISH OIL ADULT GUMMIES) 113.5 MG CHEW Take 444 mg by mouth daily       Vitamin E 100 units TABS Take 100 Units by mouth daily 30 tablet 0       REVIEW OF SYSTEMS:  4 point ROS including Respiratory, CV, GI and , other than that noted in the HPI,  is negative    Objective:  /71   Pulse 72   Temp 97.8  F (36.6  C)   Resp 16   Ht 1.651 m (5' 5\")   Wt 86.9 kg (191 lb 8 oz)   LMP  (LMP Unknown)   SpO2 96%   BMI 31.87 kg/m    Exam:  GENERAL APPEARANCE:  Alert, in no distress, appears healthy, cooperative  EYES:  EOM normal, conjunctiva and lids normal, wears corrective lenses  RESP:  respiratory effort and palpation of chest normal, lungs clear to auscultation , no respiratory distress  CV:  Palpation and auscultation of heart done , regular rate and rhythm, no murmur, rub, or gallop, ankles show +1 edema minimally.  legs are pink, no open areas  M/S:   Gait and station abnormal steady state with the walker and she is independent with transfers  SKIN:  pink, warm and dry.  PSYCH:  oriented to person, place and time can be vague at times.  short term memory loss., memory impaired , affect and mood " normal, have seen her anxious and fixated on subjects before    Copied B/P's from last part of May:  05/27 136/71  05/26 151/76  05/25 170/78  05/24 129/54  05/23 144/72  05/22 144/68  05/21 165/74    Copied B/P's from beginning of May/end of April 5/5 - 203/84 5/4 - 192/99  5/3 - 188/78 5/2 - 164/88 5/1 - 150/65  4/30 - 174/87  4/29 - 173/89    Labs:     Most Recent 3 CBC's:  Recent Labs   Lab Test 12/29/20  0552 12/28/20  0555 12/27/20  0956   WBC 9.0 8.8 8.0   HGB 12.7 13.2 13.2   MCV 92 92 91    191 183     Most Recent 3 BMP's:  Recent Labs   Lab Test 12/29/20  0552 12/28/20  0555 12/27/20  0956    144 143   POTASSIUM 3.6 3.4 3.8   CHLORIDE 110* 109 109   CO2 27 28 29   BUN 12 14 16   CR 0.88 0.92 1.00   ANIONGAP 5 7 5   NANCY 10.5* 9.9 10.3*   * 155* 123*       ASSESSMENT/PLAN:  Lower extremity edema  - feel that FRANCES stockings are appropriate.  Staff will work with Glendy to make sure they get on in the AM and off in the PM.    Continue to ambulate and reposition should also help.  No special orders to elevate during the daytime as she is active.    Hypertension, goal below 140/90  See above for comparison of B/P's.  Improvement made with increasing her Norvasc to 10mg daily.  Keeping the Losartan to 100mg daily and Lopressor to 100mg twice a day.  No changes.    Coronary artery disease, angina presence unspecified, unspecified vessel or lesion type, unspecified whether native or transplanted heart  Remains on ASA 325mg daily.  Does have the Norvasc and metoprolol as well.  PRN Nitroglycerin available if needed.  No changes.  No complaints.    Vascular dementia without behavioral disturbance (H)  Reactive depression  Continues to live on a memory care unit which is perfect for her.  She loves her family but this is a place that she can be more herself and others to socialize with.  Is on Celexa 10mg daily which is helpful with her anxiety.  This was added at the end of April.  Doing well  with 10mg.        No new orders      Total time with an established patient visit in the assisted livin minutes including discussions about the POC and care coordination with the patient and AL staff. Greater than 50% of total time spent with counseling and coordinating care due to reviewing medication, visit/assessment and adjustments to plan of care.  Electronically signed by:  LEATHA Hagen CNP                 Sincerely,        LEATHA Hagen CNP

## 2021-06-02 NOTE — PROGRESS NOTES
[place] [facility]  [place] [facility] Record Number:  [id number]  Place of Service where encounter took place:  [facility] ([facility]) [[id number]]  Chief Complaint   Patient presents with     RECHECK       HPI:    Loni Ybarra  is a 80 year old (1940), who is being seen today for an episodic care visit.  HPI information obtained from: facility chart records, facility staff, patient report and [facility] chart review. Today's concern is:    1. Lower extremity edema    2. Hypertension, goal below 140/90    3. Coronary artery disease, angina presence unspecified, unspecified vessel or lesion type, unspecified whether native or transplanted heart    4. Vascular dementia without behavioral disturbance (H)      Came to see Glendy today as to follow up with her increase in the Norvasc to 10mg daily but now most recently started her on FRANCES stockings due to +2 edema seen by staff.  Most likely due to the increase in medication and Glendy does often change positions but could be from dependency of legs.    Glendy has been all over the place today.  Saw her outside with staff and her peers and then she went to another activity.  Glendy does not isolate herself.  Typically she is a happy person who loves to socialize.      Finally catching in her room walking with her walker.  No real complaints.  In process of getting FARNCES stockings as this NP ordered them via the bridge on 5/31/21    Past Medical and Surgical History reviewed in Epic today.    MEDICATIONS:    Current Outpatient Medications   Medication Sig Dispense Refill     acetaminophen (TYLENOL) 500 MG tablet Take 500 mg by mouth every 4 hours as needed for mild pain       amLODIPine (NORVASC) 5 MG tablet Take 2 tablets (10 mg) by mouth daily 30 tablet 0     aspirin (ASA) 325 MG tablet Take 1 tablet (325 mg) by mouth daily 30 tablet 0     atorvastatin (LIPITOR) 40 MG tablet Take 1 tablet (40 mg) by mouth daily 30 tablet 0     Bioflavonoid Products  "(VITAMIN C) CHEW Take 1 chew tab by mouth daily 30 tablet 0     chlorhexidine (PERIDEX) 0.12 % solution Swish and spit 15 mLs in mouth 2 times daily 118 mL 0     citalopram (CELEXA) 10 MG tablet Take 1 tablet (10 mg) by mouth daily       LORazepam (ATIVAN) 0.5 MG tablet Take 1 tablet (0.5 mg) by mouth every 6 hours as needed for anxiety 10 tablet 0     losartan (COZAAR) 100 MG tablet Take 1 tablet (100 mg) by mouth daily 30 tablet 0     metoprolol tartrate (LOPRESSOR) 100 MG tablet Take 1 tablet (100 mg) by mouth 2 times daily       nitroGLYcerin (NITROSTAT) 0.4 MG sublingual tablet Place 1 tablet (0.4 mg) under the tongue every 5 minutes as needed for chest pain 10 tablet 0     Omega-3 Fatty Acids (FISH OIL ADULT GUMMIES) 113.5 MG CHEW Take 444 mg by mouth daily       Vitamin E 100 units TABS Take 100 Units by mouth daily 30 tablet 0       REVIEW OF SYSTEMS:  4 point ROS including Respiratory, CV, GI and , other than that noted in the HPI,  is negative    Objective:  /71   Pulse 72   Temp 97.8  F (36.6  C)   Resp 16   Ht 1.651 m (5' 5\")   Wt 86.9 kg (191 lb 8 oz)   LMP  (LMP Unknown)   SpO2 96%   BMI 31.87 kg/m    Exam:  GENERAL APPEARANCE:  Alert, in no distress, appears healthy, cooperative  EYES:  EOM normal, conjunctiva and lids normal, wears corrective lenses  RESP:  respiratory effort and palpation of chest normal, lungs clear to auscultation , no respiratory distress  CV:  Palpation and auscultation of heart done , regular rate and rhythm, no murmur, rub, or gallop, ankles show +1 edema minimally.  legs are pink, no open areas  M/S:   Gait and station abnormal steady state with the walker and she is independent with transfers  SKIN:  pink, warm and dry.  PSYCH:  oriented to person, place and time can be vague at times.  short term memory loss., memory impaired , affect and mood normal, have seen her anxious and fixated on subjects before    Copied B/P's from last part of May:  05/27 " 136/71   151/76   170/78   129/54   144/72   144/68   165/74    Copied B/P's from beginning of May/end of  - 203/84   - 192/99  5/3 - 188/78   - 164/88   - 150/65   - 174/87   - 173/89    Labs:     Most Recent 3 CBC's:  Recent Labs   Lab Test 20  0552 20  0555 20  0956   WBC 9.0 8.8 8.0   HGB 12.7 13.2 13.2   MCV 92 92 91    191 183     Most Recent 3 BMP's:  Recent Labs   Lab Test 20  0552 20  0555 20  0956    144 143   POTASSIUM 3.6 3.4 3.8   CHLORIDE 110* 109 109   CO2    BUN 12 14 16   CR 0.88 0.92 1.00   ANIONGAP 5 7 5   NANCY 10.5* 9.9 10.3*   * 155* 123*       ASSESSMENT/PLAN:  Lower extremity edema  - feel that FRANCES stockings are appropriate.  Staff will work with Glendy to make sure they get on in the AM and off in the PM.    Continue to ambulate and reposition should also help.  No special orders to elevate during the daytime as she is active.    Hypertension, goal below 140/90  See above for comparison of B/P's.  Improvement made with increasing her Norvasc to 10mg daily.  Keeping the Losartan to 100mg daily and Lopressor to 100mg twice a day.  No changes.    Coronary artery disease, angina presence unspecified, unspecified vessel or lesion type, unspecified whether native or transplanted heart  Remains on ASA 325mg daily.  Does have the Norvasc and metoprolol as well.  PRN Nitroglycerin available if needed.  No changes.  No complaints.    Vascular dementia without behavioral disturbance (H)  Reactive depression  Continues to live on a memory care unit which is perfect for her.  She loves her family but this is a place that she can be more herself and others to socialize with.  Is on Celexa 10mg daily which is helpful with her anxiety.  This was added at the end of April.  Doing well with 10mg.        No new orders      Total time with an established patient visit in the assisted livin  minutes including discussions about the POC and care coordination with the patient and AL staff. Greater than 50% of total time spent with counseling and coordinating care due to reviewing medication, visit/assessment and adjustments to plan of care.  Electronically signed by:  LEATHA Hagen CNP

## 2021-06-16 PROBLEM — F32.9 REACTIVE DEPRESSION: Status: ACTIVE | Noted: 2021-01-01

## 2021-07-23 NOTE — TELEPHONE ENCOUNTER
Not finding anything off hand in chart or audiology visit.  Will forward to provider to advise. May need audiology order??

## 2021-07-27 NOTE — TELEPHONE ENCOUNTER
Jackie Hawkins, LEATHA CNP  You 3 days ago   CL  Thank you.     She is at [facility] and so will work with the staff to help family get her in the right direction for her hearing aids     davy Calderon text

## 2021-07-28 NOTE — LETTER
7/28/2021        RE: Loni Ybarra  [facility]  [address]  [address]  [facility] [address]        [facility]  [facility] Record Number:  [id number]  Place of Service where encounter took place:  [facility] ([facility]) [[id number]]  Chief Complaint   Patient presents with     RECHECK       HPI:    Loni Ybarra  is a 80 year old (1940), who is being seen today for an episodic care visit.  HPI information obtained from: facility chart records, patient report and [facility] chart review. Today's concern is:    1. Dysuria    2. Overactive bladder    3. Vascular dementia without behavioral disturbance (H)      Came to see patient at [facility] today as she is concerned with hematuria and burning that she has been experiencing. Upon entering the room, Glendy is sitting in her chair watching television. She has a smile on her face and is very happy to see both this student and NP. She states that she is not sure how long she has had the symptoms but that they are uncomfortable. She does endorse some urgency but states that she has had this for a long time. She does wear a pad and reports noting anywhere from a little to a large amount of both blood and urine on the pad. The burning and hematuria do not occur each time she urinates, states that it is intermittent. Patient does have a history notable for dementia so she is unable to recall whether or not she has a history of frequent UTIs in the past. According to [facility] records, patient was last treated for a UTI with Keflex in December of 2020.     Past Medical and Surgical History reviewed in Epic today.    MEDICATIONS:  Current Outpatient Medications   Medication Sig Dispense Refill     acetaminophen (TYLENOL) 500 MG tablet Take 500 mg by mouth every 4 hours as needed for mild pain       amLODIPine (NORVASC) 5 MG tablet Take 2 tablets (10 mg) by mouth daily 30 tablet 0     aspirin (ASA) 325 MG  "tablet Take 1 tablet (325 mg) by mouth daily 30 tablet 0     atorvastatin (LIPITOR) 40 MG tablet Take 1 tablet (40 mg) by mouth daily 30 tablet 0     Bioflavonoid Products (VITAMIN C) CHEW Take 1 chew tab by mouth daily 30 tablet 0     chlorhexidine (PERIDEX) 0.12 % solution Swish and spit 15 mLs in mouth 2 times daily 118 mL 0     citalopram (CELEXA) 10 MG tablet Take 1 tablet (10 mg) by mouth daily       LORazepam (ATIVAN) 0.5 MG tablet Take 1 tablet (0.5 mg) by mouth every 6 hours as needed for anxiety 10 tablet 0     losartan (COZAAR) 100 MG tablet Take 1 tablet (100 mg) by mouth daily 30 tablet 0     metoprolol tartrate (LOPRESSOR) 100 MG tablet Take 1 tablet (100 mg) by mouth 2 times daily       nitroGLYcerin (NITROSTAT) 0.4 MG sublingual tablet Place 1 tablet (0.4 mg) under the tongue every 5 minutes as needed for chest pain 10 tablet 0     Omega-3 Fatty Acids (FISH OIL ADULT GUMMIES) 113.5 MG CHEW Take 444 mg by mouth daily       Vitamin E 100 units TABS Take 100 Units by mouth daily 30 tablet 0       REVIEW OF SYSTEMS:  4 point ROS including Respiratory, CV, GI and , other than that noted in the HPI,  is negative    Objective:  BP (!) 165/72   Pulse 78   Temp 97.5  F (36.4  C)   Resp 18   Ht 1.651 m (5' 5\")   Wt 85.2 kg (187 lb 12.8 oz)   LMP  (LMP Unknown)   SpO2 92%   BMI 31.25 kg/m    Exam:  GENERAL APPEARANCE:  Alert, in no distress, appears healthy  RESP:  lungs clear to auscultation , no respiratory distress  CV:  regular rate and rhythm, no murmur, rub, or gallop, no edema  ABDOMEN:  no guarding or rebound, bowel sounds normal  M/S:   Normal strength and tone; uses 4 wheel walker to ambulate   PSYCH:  oriented to person, place, and year, unsure of exact day of the week     Labs:     Most recent labs reported in EPIC  Most Recent 3 CBC's:Recent Labs   Lab Test 12/29/20  0552 12/28/20  0555 12/27/20  0956   WBC 9.0 8.8 8.0   HGB 12.7 13.2 13.2   MCV 92 92 91    191 183     Most Recent " 3 BMP's:Recent Labs   Lab Test 20  0552 20  0555 20  0956    144 143   POTASSIUM 3.6 3.4 3.8   CHLORIDE 110* 109 109   CO2 27 28 29   BUN 12 14 16   CR 0.88 0.92 1.00   ANIONGAP 5 7 5   NANCY 10.5* 9.9 10.3*   * 155* 123*       ASSESSMENT/PLAN:    Dysuria  Overactive bladder  - Staff at [facility] will collect UA/UC from patient. Based on those results will determine if patient needs to be started on an antibiotic for UTI.   Does not take any bladder medication to help with her overactive bladder.  May need to consider this in future but discuss with staff    Vascular dementia without behavioral disturbance (H)  Patient not able to provide full history of symptoms due to dementia. Did speak with staff as well to obtain additional information.     Presbycusis of both ears  Glendy's spouse called around lunch time directly to this NP.  He was guided by his doctor to contact this NP to help Glendy with her hearing aids.  Either get new ones or have the others checked.  Spouse states they are not working.  Did not think he they were lost.    Sent a order through the bridge to the staff to set up an appointment with audiology.  Not sure if one comes to the AL or if they will need to set her up in the community.      Orders by the NP:   - UA/UC to be collected as soon as possible   Order sent through bridge for Glendy to get set up with audiology for hearing aids for dx of Presbycusis      Total time with an established patient visit in the assisted livin minutes including discussions about the POC and care coordination with the patient. Greater than 50% of total time spent with counseling and coordinating care due to burning and hematuria.     This NP saw Glendy with NP student. Discussed plan of care and exam findings in order to pick the appropriate plan of care.  Agree with the above written as well as what extra the primary NP wrote    Electronically signed by Jackie Hawkins RN,  CNP    Electronically signed by:  ES MitchellN, RN, FNP DNP student               Sincerely,        LEATHA Hagen CNP

## 2021-07-28 NOTE — PROGRESS NOTES
[facility]  [facility] Record Number:  [id number]  Place of Service where encounter took place:  [facility] ([facility] [[id number]]  Chief Complaint   Patient presents with     RECHECK       HPI:    Loni Ybarra  is a 80 year old (1940), who is being seen today for an episodic care visit.  HPI information obtained from: facility chart records, patient report and [facility] chart review. Today's concern is:    1. Dysuria    2. Overactive bladder    3. Vascular dementia without behavioral disturbance (H)      Came to see patient at [facility] today as she is concerned with hematuria and burning that she has been experiencing. Upon entering the room, Glendy is sitting in her chair watching television. She has a smile on her face and is very happy to see both this student and NP. She states that she is not sure how long she has had the symptoms but that they are uncomfortable. She does endorse some urgency but states that she has had this for a long time. She does wear a pad and reports noting anywhere from a little to a large amount of both blood and urine on the pad. The burning and hematuria do not occur each time she urinates, states that it is intermittent. Patient does have a history notable for dementia so she is unable to recall whether or not she has a history of frequent UTIs in the past. According to [facility] records, patient was last treated for a UTI with Keflex in December of 2020.     Past Medical and Surgical History reviewed in Epic today.    MEDICATIONS:  Current Outpatient Medications   Medication Sig Dispense Refill     acetaminophen (TYLENOL) 500 MG tablet Take 500 mg by mouth every 4 hours as needed for mild pain       amLODIPine (NORVASC) 5 MG tablet Take 2 tablets (10 mg) by mouth daily 30 tablet 0     aspirin (ASA) 325 MG tablet Take 1 tablet (325 mg) by mouth daily 30 tablet 0     atorvastatin (LIPITOR) 40 MG tablet Take 1 tablet (40 mg)  "by mouth daily 30 tablet 0     Bioflavonoid Products (VITAMIN C) CHEW Take 1 chew tab by mouth daily 30 tablet 0     chlorhexidine (PERIDEX) 0.12 % solution Swish and spit 15 mLs in mouth 2 times daily 118 mL 0     citalopram (CELEXA) 10 MG tablet Take 1 tablet (10 mg) by mouth daily       LORazepam (ATIVAN) 0.5 MG tablet Take 1 tablet (0.5 mg) by mouth every 6 hours as needed for anxiety 10 tablet 0     losartan (COZAAR) 100 MG tablet Take 1 tablet (100 mg) by mouth daily 30 tablet 0     metoprolol tartrate (LOPRESSOR) 100 MG tablet Take 1 tablet (100 mg) by mouth 2 times daily       nitroGLYcerin (NITROSTAT) 0.4 MG sublingual tablet Place 1 tablet (0.4 mg) under the tongue every 5 minutes as needed for chest pain 10 tablet 0     Omega-3 Fatty Acids (FISH OIL ADULT GUMMIES) 113.5 MG CHEW Take 444 mg by mouth daily       Vitamin E 100 units TABS Take 100 Units by mouth daily 30 tablet 0       REVIEW OF SYSTEMS:  4 point ROS including Respiratory, CV, GI and , other than that noted in the HPI,  is negative    Objective:  BP (!) 165/72   Pulse 78   Temp 97.5  F (36.4  C)   Resp 18   Ht 1.651 m (5' 5\")   Wt 85.2 kg (187 lb 12.8 oz)   LMP  (LMP Unknown)   SpO2 92%   BMI 31.25 kg/m    Exam:  GENERAL APPEARANCE:  Alert, in no distress, appears healthy  RESP:  lungs clear to auscultation , no respiratory distress  CV:  regular rate and rhythm, no murmur, rub, or gallop, no edema  ABDOMEN:  no guarding or rebound, bowel sounds normal  M/S:   Normal strength and tone; uses 4 wheel walker to ambulate   PSYCH:  oriented to person, place, and year, unsure of exact day of the week     Labs:     Most recent labs reported in EPIC  Most Recent 3 CBC's:Recent Labs   Lab Test 12/29/20  0552 12/28/20  0555 12/27/20  0956   WBC 9.0 8.8 8.0   HGB 12.7 13.2 13.2   MCV 92 92 91    191 183     Most Recent 3 BMP's:Recent Labs   Lab Test 12/29/20  0552 12/28/20  0555 12/27/20  0956    144 143   POTASSIUM 3.6 3.4 3.8 "   CHLORIDE 110* 109 109   CO2 27 28 29   BUN 12 14 16   CR 0.88 0.92 1.00   ANIONGAP 5 7 5   NANCY 10.5* 9.9 10.3*   * 155* 123*       ASSESSMENT/PLAN:    Dysuria  Overactive bladder  - Staff at [facility] will collect UA/UC from patient. Based on those results will determine if patient needs to be started on an antibiotic for UTI.   Does not take any bladder medication to help with her overactive bladder.  May need to consider this in future but discuss with staff    Vascular dementia without behavioral disturbance (H)  Patient not able to provide full history of symptoms due to dementia. Did speak with staff as well to obtain additional information.     Presbycusis of both ears  Glendy's spouse called around lunch time directly to this NP.  He was guided by his doctor to contact this NP to help Glendy with her hearing aids.  Either get new ones or have the others checked.  Spouse states they are not working.  Did not think he they were lost.    Sent a order through the bridge to the staff to set up an appointment with audiology.  Not sure if one comes to the AL or if they will need to set her up in the community.      Orders by the NP:   - UA/UC to be collected as soon as possible   Order sent through bridge for Glendy to get set up with audiology for hearing aids for dx of Presbycusis      Total time with an established patient visit in the assisted livin minutes including discussions about the POC and care coordination with the patient. Greater than 50% of total time spent with counseling and coordinating care due to burning and hematuria.     This NP saw Glendy with NP student. Discussed plan of care and exam findings in order to pick the appropriate plan of care.  Agree with the above written as well as what extra the primary NP wrote    Electronically signed by Jackie Hawkins RN, CNP    Electronically signed by:  ES MitchellN, RN, FNP DNP student

## 2021-08-04 NOTE — PROGRESS NOTES
[facility]    Chief Complaint   Patient presents with     RECHECK     HPI:  Loni Ybarra is a 80 year old  (1940), who is being seen today for an episodic care visit at: [facility] ([facility]) [[id number]]. Today's concern is:    Diagnoses       Codes Comments    Pain and swelling of toe of left foot    -  Primary M79.675, M79.89     Vascular dementia without behavioral disturbance (H)     F01.50         Nursing asked this NP to see Glendy today due to what appeared to be redness and swelling of her left great toe.  One of the aides whom helped her today with dressing found the problem.  Glendy was recently seen by podiatry and thought maybe there was an injury.      Found Glendy helping sort crafts out today and she was smiling and talkative.  She did not recall her great toe bothering her but was agreeable to allow a look at her foot.    Allergies, and PMH/PSH reviewed in EPIC today.    Current Outpatient Medications   Medication Sig Dispense Refill     acetaminophen (TYLENOL) 500 MG tablet Take 500 mg by mouth every 4 hours as needed for mild pain       amLODIPine (NORVASC) 5 MG tablet Take 2 tablets (10 mg) by mouth daily 30 tablet 0     aspirin (ASA) 325 MG tablet Take 1 tablet (325 mg) by mouth daily 30 tablet 0     atorvastatin (LIPITOR) 40 MG tablet Take 1 tablet (40 mg) by mouth daily 30 tablet 0     Bioflavonoid Products (VITAMIN C) CHEW Take 1 chew tab by mouth daily 30 tablet 0     chlorhexidine (PERIDEX) 0.12 % solution Swish and spit 15 mLs in mouth 2 times daily 118 mL 0     citalopram (CELEXA) 10 MG tablet Take 1 tablet (10 mg) by mouth daily       LORazepam (ATIVAN) 0.5 MG tablet Take 1 tablet (0.5 mg) by mouth every 6 hours as needed for anxiety 10 tablet 0     losartan (COZAAR) 100 MG tablet Take 1 tablet (100 mg) by mouth daily 30 tablet 0     metoprolol tartrate (LOPRESSOR) 100 MG tablet Take 1 tablet (100 mg) by mouth 2 times daily       nitroGLYcerin (NITROSTAT) 0.4 MG sublingual  "tablet Place 1 tablet (0.4 mg) under the tongue every 5 minutes as needed for chest pain 10 tablet 0     Omega-3 Fatty Acids (FISH OIL ADULT GUMMIES) 113.5 MG CHEW Take 444 mg by mouth daily       Vitamin E 100 units TABS Take 100 Units by mouth daily 30 tablet 0       REVIEW OF SYSTEMS:  4 point ROS including Respiratory, CV, GI and , other than that noted in the HPI,  is negative    Objective:   BP (!) 165/72   Pulse 78   Temp 97.5  F (36.4  C)   Resp 18   Ht 1.651 m (5' 5\")   Wt 85.2 kg (187 lb 12.8 oz)   LMP  (LMP Unknown)   SpO2 92%   BMI 31.25 kg/m    Remembers Podiatry seeing her but denies issues with her feet.  Black support stockings on both feet.  Helped remove the left stocking.    Toe nails painted and trimmed.  Slight thickness to them.  Great toe shows no swelling or redness.  When palpating, no pain.  Has a little spot on outside of great toe that is firm to touch but can not see it unless palpating her toes.    Checked her right great toe and no swelling, no pain but has the same spot on the outside of that foot too.      Labs not applicable.      Assessment/Plan:  1. Pain and swelling of toe of left foot    2. Vascular dementia without behavioral disturbance (H)      Usually Glendy is good about mentioning if there is discomfort.  This happened to be something an aide saw and reported it to the nurses.  No signs of infection.  No pus pockets seen.    No orders to give.  Told the staff that and they said next time will look themselves before having this NP look for nothing.    Orders:  none      Electronically signed by: LEATHA Hagen CNP       "

## 2021-08-04 NOTE — LETTER
8/4/2021        [facility]oni [facility]  [facility]  [address]  [address]  [place] [address]        [facility]    Chief Complaint   Patient presents with     RECHECK     HPI:  Loni Ybarra is a 80 year old  (1940), who is being seen today for an episodic care visit at: [facility] ([facility]) [[id number]]. Today's concern is:    Diagnoses       Codes Comments    Pain and swelling of toe of left foot    -  Primary M79.675, M79.89     Vascular dementia without behavioral disturbance (H)     F01.50         Nursing asked this NP to see Glendy today due to what appeared to be redness and swelling of her left great toe.  One of the aides whom helped her today with dressing found the problem.  Glendy was recently seen by podiatry and thought maybe there was an injury.      Found Glendy helping sort crafts out today and she was smiling and talkative.  She did not recall her great toe bothering her but was agreeable to allow a look at her foot.    Allergies, and PMH/PSH reviewed in EPIC today.    Current Outpatient Medications   Medication Sig Dispense Refill     acetaminophen (TYLENOL) 500 MG tablet Take 500 mg by mouth every 4 hours as needed for mild pain       amLODIPine (NORVASC) 5 MG tablet Take 2 tablets (10 mg) by mouth daily 30 tablet 0     aspirin (ASA) 325 MG tablet Take 1 tablet (325 mg) by mouth daily 30 tablet 0     atorvastatin (LIPITOR) 40 MG tablet Take 1 tablet (40 mg) by mouth daily 30 tablet 0     Bioflavonoid Products (VITAMIN C) CHEW Take 1 chew tab by mouth daily 30 tablet 0     chlorhexidine (PERIDEX) 0.12 % solution Swish and spit 15 mLs in mouth 2 times daily 118 mL 0     citalopram (CELEXA) 10 MG tablet Take 1 tablet (10 mg) by mouth daily       LORazepam (ATIVAN) 0.5 MG tablet Take 1 tablet (0.5 mg) by mouth every 6 hours as needed for anxiety 10 tablet 0     losartan (COZAAR) 100 MG tablet Take 1 tablet (100 mg) by mouth daily 30 tablet 0     metoprolol tartrate  "(LOPRESSOR) 100 MG tablet Take 1 tablet (100 mg) by mouth 2 times daily       nitroGLYcerin (NITROSTAT) 0.4 MG sublingual tablet Place 1 tablet (0.4 mg) under the tongue every 5 minutes as needed for chest pain 10 tablet 0     Omega-3 Fatty Acids (FISH OIL ADULT GUMMIES) 113.5 MG CHEW Take 444 mg by mouth daily       Vitamin E 100 units TABS Take 100 Units by mouth daily 30 tablet 0       REVIEW OF SYSTEMS:  4 point ROS including Respiratory, CV, GI and , other than that noted in the HPI,  is negative    Objective:   BP (!) 165/72   Pulse 78   Temp 97.5  F (36.4  C)   Resp 18   Ht 1.651 m (5' 5\")   Wt 85.2 kg (187 lb 12.8 oz)   LMP  (LMP Unknown)   SpO2 92%   BMI 31.25 kg/m    Remembers Podiatry seeing her but denies issues with her feet.  Black support stockings on both feet.  Helped remove the left stocking.    Toe nails painted and trimmed.  Slight thickness to them.  Great toe shows no swelling or redness.  When palpating, no pain.  Has a little spot on outside of great toe that is firm to touch but can not see it unless palpating her toes.    Checked her right great toe and no swelling, no pain but has the same spot on the outside of that foot too.      Labs not applicable.      Assessment/Plan:  1. Pain and swelling of toe of left foot    2. Vascular dementia without behavioral disturbance (H)      Usually Glendy is good about mentioning if there is discomfort.  This happened to be something an aide saw and reported it to the nurses.  No signs of infection.  No pus pockets seen.    No orders to give.  Told the staff that and they said next time will look themselves before having this NP look for nothing.    Orders:  none      Electronically signed by: LEATHA Hagen CNP             Sincerely,        LEATHA Hagen CNP    "

## 2021-08-06 NOTE — PROGRESS NOTES
A message was forwarded on from a provider at [facility] from Glendy's spouse.  He has called a few people about a hearing evaluation.    As of yesterday the AL facility where she resides updated him that in house audiology does not come out for one person.    Next this provider sees is that he may have called an old provider of hers to get orders?  Not sure of the timing of it all but will place a referral now.      Today placed a referral to [facility]iology services in [place] and so hopefully they will reach out to him as Glendy's spouse to set up an appointment.      Electronically signed by Jackie Hawkins RN, CNP

## 2021-08-26 NOTE — PROGRESS NOTES
AUDIOLOGY REPORT    SUBJECTIVE:  Loni Ybarra is a 80 year old female who was seen in the Audiology Clinic at the [facility] for audiologic evaluation, referred by LEATHA Francisco. The patient reports concern regarding decreased hearing, hard to follow conversation at group activities. She reported previous use of hearing aids, but isn t sure where the devices are currently. She denied tinnitus. She denied vertigo. She did have a fall where she was hospitalized in February 2021 and she is currently in a rehab facility. They were unaccompanied today, her  waited for her in the waiting room.    OBJECTIVE:  Abuse Screening:  Do you feel unsafe at home or work/school? No  Do you feel threatened by someone? No  Does anyone try to keep you from having contact with others, or doing things outside of your home? No  Physical signs of abuse present? No     Otoscopic exam indicates ears are clear of cerumen bilaterally     Pure Tone Thresholds assessed using conventional audiometry with good reliability from 250-8000 Hz bilaterally using circumaural headphones     RIGHT:  mild sloping to moderate mixed hearing loss    LEFT:    mild sloping to moderate mixed hearing loss    Tympanogram:    RIGHT: negative pressure     LEFT:   normal eardrum mobility    Reflexes (reported by stimulus ear):  RIGHT: Ipsilateral is absent at frequencies tested  RIGHT: Contralateral is absent at frequencies tested  LEFT:   Ipsilateral is absent at frequencies tested  LEFT:   Contralateral is absent at frequencies tested    Speech Reception Threshold:    RIGHT: 50 dB HL    LEFT:   50 dB HL    Word Recognition Score:     RIGHT: 100% at 90 dB HL using NU-6 recorded word list.    LEFT:   96% at 90 dB HL using NU-6 recorded word list.      ASSESSMENT:     ICD-10-CM    1. Mixed hearing loss, bilateral  H90.6 Cmprhn Audiometry Thrshld Eval & Speech Recog (37745)     Tymps / Reflex   ([id number])     Today s results were  discussed with the patient in detail.     PLAN:    Patient was counseled regarding hearing loss and impact on communication.  Patient is a good candidate for amplification at this time. It is recommended that the patient return for hearing aid programming if her hearing aids can be located or for hearing aid consultation.  Please call this clinic with questions regarding these results or recommendations.        Akin Blanco.  [place] Licensed Audiologist #[id number]

## 2021-09-17 NOTE — PROGRESS NOTES
[facility] GERIATRIC SERVICES    Chief Complaint   Patient presents with     RECHECK     HPI:  Loni Ybarra is a 80 year old  (1940), who is being [place] today [place] [facility] [place] [facility] [[id number]]. Today's concern is:     Diagnoses       Codes Comments    Traumatic hematoma of forehead, subsequent encounter    -  Primary S00.83XD     Open fracture of nasal bone with routine healing, subsequent encounter     S02.2XXD     Fall, subsequent encounter     W19.XXXD     Vascular dementia without behavioral disturbance (H)     F01.50         Came to see Glendy today per directions of ED visit at [facility] on 9/15/21.  Glendy took a traumatic fall in her apartment in memory care and due to immediate trauma to her face, she was sent in.    Glendy suffered hematoma to her face and upper lips as well as a open nasal bone fracture.  Glendy is on Keflex due to the nasal fracture.    Glenyd lives in a memory care AL which she typically functions very well and able to ambulate with a walker.  Spouse comes to visit her and take her out.      Today staff state she has been sleeping and so went into her apartment and a aide was making her bed and Glendy was reclined in her chair just waking up.    Glendy was her cheery personality.  States she has no pain.  Does not remember the fall either.  Staff also reported today she had another fall. Tripped over her CROC shoes and so taken away and now wears tennis shoes.      Allergies, and PMH/PSH reviewed in EPIC today.    Current Outpatient Medications   Medication Sig Dispense Refill     acetaminophen (TYLENOL) 500 MG tablet Take 500 mg by mouth every 4 hours as needed for mild pain       amLODIPine (NORVASC) 5 MG tablet Take 2 tablets (10 mg) by mouth daily 30 tablet 0     aspirin (ASA) 325 MG tablet Take 1 tablet (325 mg) by mouth daily 30 tablet 0     atorvastatin (LIPITOR) 40 MG tablet Take 1 tablet (40 mg) by mouth daily 30 tablet  "0     Bioflavonoid Products (VITAMIN C) CHEW Take 1 chew tab by mouth daily 30 tablet 0     chlorhexidine (PERIDEX) 0.12 % solution Swish and spit 15 mLs in mouth 2 times daily 118 mL 0     citalopram (CELEXA) 10 MG tablet Take 1 tablet (10 mg) by mouth daily       LORazepam (ATIVAN) 0.5 MG tablet Take 1 tablet (0.5 mg) by mouth every 6 hours as needed for anxiety 10 tablet 0     losartan (COZAAR) 100 MG tablet Take 1 tablet (100 mg) by mouth daily 30 tablet 0     metoprolol tartrate (LOPRESSOR) 100 MG tablet Take 1 tablet (100 mg) by mouth 2 times daily       nitroGLYcerin (NITROSTAT) 0.4 MG sublingual tablet Place 1 tablet (0.4 mg) under the tongue every 5 minutes as needed for chest pain 10 tablet 0     Omega-3 Fatty Acids (FISH OIL ADULT GUMMIES) 113.5 MG CHEW Take 444 mg by mouth daily       Vitamin E 100 units TABS Take 100 Units by mouth daily 30 tablet 0       REVIEW OF SYSTEMS:  4 point ROS including Respiratory, CV, GI and , other than that noted in the HPI,  is negative    Objective:   BP (!) 165/72   Pulse 78   Temp 97.5  F (36.4  C)   Resp 18   Ht 1.651 m (5' 5\")   Wt 84.8 kg (187 lb)   LMP  (LMP Unknown)   BMI 31.12 kg/m    Cognitively, Glendy was at the same alertness as she has been prior to the fall  Glendy's eyelids dark blue/red bruising.  Hematoma under nose and above the upper lip.  Blue/red bruising.  Not really swollen.  There is a raised area under right eye that is not discolored or warm to touch.  Heart rate regular and strong.  S1 and S2.  Lungs clear.    Ambulating with walker.  Small laceration on bridge of nose.  Not really swollen.      Copied from ER visit:  EXAM - CT HEAD W/O CON W/O 3D    DATE: 9/15/2021 10:53 AM    HISTORY - Trauma    No prior studies.    TECHNIQUE:  CT scan of the head was performed from the vertex through the posterior fossa. No IV contrast was administered. Coronal reconstructions were also reviewed.    FINDINGS:  The current study reveals a small subgaleal " hematoma in the right medial forehead. There is also soft tissue swelling of the nose with bilateral nasal bone fractures. The lamina papyracea are intact. No air-fluid levels are seen in the paranasal sinuses. The mastoid air cells are clear.    There is moderate cerebral atrophy with moderate involutional type changes in the deep white matter. Subtle encephalomalacia is also seen in the medial aspect of the right frontal lobe, most likely from prior trauma. No focal edema and mass effect is seen to suggest acute peripheral infarction. There is no intracerebral hemorrhage. No extra-axial fluid collections are appreciated.    The [place] of [place] is grossly within normal limits. There is no evidence of hydrocephalus. The mesial temporal lobes are symmetric. The patient is status post bilateral cataract surgery.    Assessment/Plan:  1. Traumatic hematoma of forehead, subsequent encounter    2. Open fracture of nasal bone with routine healing, subsequent encounter    3. Fall, subsequent encounter    4. Vascular dementia without behavioral disturbance (H)      Glendy is doing better than expected.  Asked staff about what they felt about testing her bladder to make sure she did not have an infection underlying that causing her falls.  Staff have asked if PT/OT can evaluation her.    Will follow up with her HgB level and check her kidney function.        Orders:  1.  Will order a UA/UC.  2.  CBC and BMP on Monday - bruising.    3.  PT/OT eval due to 2 short falls in a short time.      Electronically signed by: LEATHA Hagen CNP

## 2021-09-17 NOTE — LETTER
9/17/2021        RE: Loni Ybarra  [facility]  [address]  [address]  [place] [address]         HEALTH GERIATRIC SERVICES    Chief Complaint   Patient presents with     RECHECK     HPI:  Loni Ybarra is a 80 year old  (1940), who is being seen today for an episodic care visit at: [facility] ([facility]) [[id number]]. Today's concern is:     Diagnoses       Codes Comments    Traumatic hematoma of forehead, subsequent encounter    -  Primary S00.83XD     Open fracture of nasal bone with routine healing, subsequent encounter     S02.2XXD     Fall, subsequent encounter     W19.XXXD     Vascular dementia without behavioral disturbance (H)     F01.50         Came to see Glendy today per directions of ED visit at [facility] on 9/15/21.  Glendy took a traumatic fall in her apartment in memory care and due to immediate trauma to her face, she was sent in.    Glendy suffered hematoma to her face and upper lips as well as a open nasal bone fracture.  Glendy is on Keflex due to the nasal fracture.    Glendy lives in a memory care AL which she typically functions very well and able to ambulate with a walker.  Spouse comes to visit her and take her out.      Today staff state she has been sleeping and so went into her apartment and a aide was making her bed and Glendy was reclined in her chair just waking up.    Glendy was her cheery personality.  States she has no pain.  Does not remember the fall either.  Staff also reported today she had another fall. Tripped over her CROC shoes and so taken away and now wears tennis shoes.      Allergies, and PMH/PSH reviewed in EPIC today.    Current Outpatient Medications   Medication Sig Dispense Refill     acetaminophen (TYLENOL) 500 MG tablet Take 500 mg by mouth every 4 hours as needed for mild pain       amLODIPine (NORVASC) 5 MG tablet Take 2 tablets (10 mg) by mouth daily 30 tablet 0     aspirin (ASA) 325 MG tablet Take 1 tablet (325 mg)  "by mouth daily 30 tablet 0     atorvastatin (LIPITOR) 40 MG tablet Take 1 tablet (40 mg) by mouth daily 30 tablet 0     Bioflavonoid Products (VITAMIN C) CHEW Take 1 chew tab by mouth daily 30 tablet 0     chlorhexidine (PERIDEX) 0.12 % solution Swish and spit 15 mLs in mouth 2 times daily 118 mL 0     citalopram (CELEXA) 10 MG tablet Take 1 tablet (10 mg) by mouth daily       LORazepam (ATIVAN) 0.5 MG tablet Take 1 tablet (0.5 mg) by mouth every 6 hours as needed for anxiety 10 tablet 0     losartan (COZAAR) 100 MG tablet Take 1 tablet (100 mg) by mouth daily 30 tablet 0     metoprolol tartrate (LOPRESSOR) 100 MG tablet Take 1 tablet (100 mg) by mouth 2 times daily       nitroGLYcerin (NITROSTAT) 0.4 MG sublingual tablet Place 1 tablet (0.4 mg) under the tongue every 5 minutes as needed for chest pain 10 tablet 0     Omega-3 Fatty Acids (FISH OIL ADULT GUMMIES) 113.5 MG CHEW Take 444 mg by mouth daily       Vitamin E 100 units TABS Take 100 Units by mouth daily 30 tablet 0       REVIEW OF SYSTEMS:  4 point ROS including Respiratory, CV, GI and , other than that noted in the HPI,  is negative    Objective:   BP (!) 165/72   Pulse 78   Temp 97.5  F (36.4  C)   Resp 18   Ht 1.651 m (5' 5\")   Wt 84.8 kg (187 lb)   LMP  (LMP Unknown)   BMI 31.12 kg/m    Cognitively, Glendy was at the same alertness as she has been prior to the fall  Glendy's eyelids dark blue/red bruising.  Hematoma under nose and above the upper lip.  Blue/red bruising.  Not really swollen.  There is a raised area under right eye that is not discolored or warm to touch.  Heart rate regular and strong.  S1 and S2.  Lungs clear.    Ambulating with walker.  Small laceration on bridge of nose.  Not really swollen.      Copied from ER visit:  EXAM - CT HEAD W/O CON W/O 3D    DATE: 9/15/2021 10:53 AM    HISTORY - Trauma    No prior studies.    TECHNIQUE:  CT scan of the head was performed from the vertex through the posterior fossa. No IV contrast was " administered. Coronal reconstructions were also reviewed.    FINDINGS:  The current study reveals a small subgaleal hematoma in the right medial forehead. There is also soft tissue swelling of the nose with bilateral nasal bone fractures. The lamina papyracea are intact. No air-fluid levels are seen in the paranasal sinuses. The mastoid air cells are clear.    There is moderate cerebral atrophy with moderate involutional type changes in the deep white matter. Subtle encephalomalacia is also seen in the medial aspect of the right frontal lobe, most likely from prior trauma. No focal edema and mass effect is seen to suggest acute peripheral infarction. There is no intracerebral hemorrhage. No extra-axial fluid collections are appreciated.    The [place] of [place] is grossly within normal limits. There is no evidence of hydrocephalus. The mesial temporal lobes are symmetric. The patient is status post bilateral cataract surgery.    Assessment/Plan:  1. Traumatic hematoma of forehead, subsequent encounter    2. Open fracture of nasal bone with routine healing, subsequent encounter    3. Fall, subsequent encounter    4. Vascular dementia without behavioral disturbance (H)      Glendy is doing better than expected.  Asked staff about what they felt about testing her bladder to make sure she did not have an infection underlying that causing her falls.  Staff have asked if PT/OT can evaluation her.    Will follow up with her HgB level and check her kidney function.        Orders:  1.  Will order a UA/UC.  2.  CBC and BMP on Monday - bruising.    3.  PT/OT eval due to 2 short falls in a short time.      Electronically signed by: LEATHA Hagen CNP           Sincerely,        LEATHA Hagen CNP

## 2021-09-22 NOTE — LETTER
9/22/2021        RE: Loni Ybarra  [facility]  [address]  [address]  [place] [address]        [facility] GERIATRIC SERVICES    Chief Complaint   Patient presents with     RECHECK     HPI:  Loni Ybarra is a 80 year old  (1940), who is being seen today for an episodic care visit at: [facility] ([facility]) [[id number]]. Today's concern is:     Diagnoses       Codes Comments    Type 2 diabetes mellitus with stage 3b chronic kidney disease, without long-term current use of insulin (H)    -  Primary E11.21, N18.32     Stage 3a chronic kidney disease     N18.31     Open fracture of nasal bone with routine healing, subsequent encounter     S02.2XXD     Traumatic hematoma of forehead, subsequent encounter     S00.83XD     Reactive depression     F32.9     Vascular dementia without behavioral disturbance (H)     F01.50         Came to see Glendy today to follow up with a urine that had close to 500 for glucose present.  She is a diabetic but no medications, no sugar checks and has been about 8 months since last A1c checked.    Glendy recently had a fall with nasal fracture and significant bruising to face.  The face bruising is faded and seems to be doing fair.  Staff now have her wear tennis shoes for safety with walking.  Usually wore CROCS.    Glendy does reside in the memory care unit.  Spouse is involved in her care.  She does talk negative of him at times and do not know how much is true as it could be a fixation in her head.    Glendy was at a activity and now back.  Is sitting in her recliner.  Answering questions to the best of her knowledge.    Allergies, and PMH/PSH reviewed in [facility] today.    Current Outpatient Medications   Medication Sig Dispense Refill     metFORMIN (GLUCOPHAGE) 500 MG tablet Take 1 tablet (500 mg) by mouth 2 times daily (with meals)       acetaminophen (TYLENOL) 500 MG tablet Take 500 mg by mouth every 4 hours as needed for mild pain       amLODIPine  "(NORVASC) 5 MG tablet Take 2 tablets (10 mg) by mouth daily 30 tablet 0     aspirin (ASA) 325 MG tablet Take 1 tablet (325 mg) by mouth daily 30 tablet 0     atorvastatin (LIPITOR) 40 MG tablet Take 1 tablet (40 mg) by mouth daily 30 tablet 0     Bioflavonoid Products (VITAMIN C) CHEW Take 1 chew tab by mouth daily 30 tablet 0     chlorhexidine (PERIDEX) 0.12 % solution Swish and spit 15 mLs in mouth 2 times daily 118 mL 0     citalopram (CELEXA) 10 MG tablet Take 1 tablet (10 mg) by mouth daily       LORazepam (ATIVAN) 0.5 MG tablet Take 1 tablet (0.5 mg) by mouth every 6 hours as needed for anxiety 10 tablet 0     losartan (COZAAR) 100 MG tablet Take 1 tablet (100 mg) by mouth daily 30 tablet 0     metoprolol tartrate (LOPRESSOR) 100 MG tablet Take 1 tablet (100 mg) by mouth 2 times daily       nitroGLYcerin (NITROSTAT) 0.4 MG sublingual tablet Place 1 tablet (0.4 mg) under the tongue every 5 minutes as needed for chest pain 10 tablet 0     Omega-3 Fatty Acids (FISH OIL ADULT GUMMIES) 113.5 MG CHEW Take 444 mg by mouth daily       Vitamin E 100 units TABS Take 100 Units by mouth daily 30 tablet 0       REVIEW OF SYSTEMS:  4 point ROS including Respiratory, CV, GI and , other than that noted in the HPI,  is negative    Objective:   BP (!) 154/66   Pulse 59   Temp 97.9  F (36.6  C)   Resp 13   Ht 1.651 m (5' 5\")   Wt 87.5 kg (192 lb 12.8 oz)   LMP  (LMP Unknown)   SpO2 97%   BMI 32.08 kg/m    Alert and oriented to self and surroundings.  Smiling, clear speech.  Does not really sound nasally.  Healing small laceration on bridge of nose.    Heart rate regular and strong.  Lungs are clear.    Fading bruising from eyes and around upper lip.    Ambulates with a walker and typically does not need assist.    Abdomen is soft and non-tender.      Labs:    Lab Results   Component Value Date    A1C 8.1 08/09/2021    A1C 6.1 12/27/2020     Component      Latest Ref Rng & Units 9/17/2021   Color Urine      Colorless, " Straw, Light Yellow, Yellow Yellow   Appearance Urine      Clear Clear   Glucose Urine      Negative mg/dL >499 (A)   Bilirubin Urine      Negative Negative   Ketones Urine      Negative mg/dL Negative   Specific Gravity Urine      1.003 - 1.035 1.025   Blood Urine      Negative Negative   pH Urine      5.0 - 7.0 5.0   Protein Albumin Urine      Negative mg/dL Negative   Urobilinogen mg/dL      Normal, 2.0 mg/dL Normal   Nitrite Urine      Negative Negative   Leukocyte Esterase Urine      Negative Negative   Mucus Urine      None Seen /LPF Present (A)   Calcium Oxalate      None Seen /HPF Few (A)   RBC Urine      <=2 /HPF 3 (H)   WBC Urine      <=5 /HPF 6 (H)   Squamous Epithelial /HPF Urine      <=1 /HPF 1       Assessment/Plan:  (E11.21,  N18.32) Type 2 diabetes mellitus with stage 3b chronic kidney disease, without long-term current use of insulin (H)  (primary encounter diagnosis)\  (N18.31) Stage 3a chronic kidney disease  Comment: currently no orders in place.  Will try Metformin first at 500mg twice a day.  Will check blood sugars twice a day for 3 weeks and then update NP.    Due to her kidney function, will get a BMP in two weeks.    Plan: metFORMIN (GLUCOPHAGE) 500 MG tablet    (S02.2XXD) Open fracture of nasal bone with routine healing, subsequent encounter  (S00.83XD) Traumatic hematoma of forehead, subsequent encounter  Comment: healing.  Minimal pain.  No headaches.  Continue to monitor for changes neurologically    (F32.9) Reactive depression  (F01.50) Vascular dementia without behavioral disturbance (H)  Comment: - doing well today.  Informed her of the medication being added.  No need to change her diet.  Let her enjoy things in life at her age - food.        Orders:  1.  Metformin 500mg po twice a day  2.  Blood sugar checks twice a day for 3 weeks and then update NP  3.  BMP in two weeks on a regular lab day - Type 2 DM    Electronically signed by: LEATHA Hagen CNP              Sincerely,        LEATHA Hagen CNP

## 2021-09-22 NOTE — PROGRESS NOTES
[facility] GERIATRIC SERVICES    Chief Complaint   Patient presents with     RECHECK     HPI:  Loni Ybarra is a 80 year old  (1940), who is being seen today for an episodic care visit at: [facility] [[id number]]. Today's concern is:     Diagnoses       Codes Comments    Type 2 diabetes mellitus with stage 3b chronic kidney disease, without long-term current use of insulin (H)    -  Primary E11.21, N18.32     Stage 3a chronic kidney disease     N18.31     Open fracture of nasal bone with routine healing, subsequent encounter     S02.2XXD     Traumatic hematoma of forehead, subsequent encounter     S00.83XD     Reactive depression     F32.9     Vascular dementia without behavioral disturbance (H)     F01.50         Came to see Glendy today to follow up with a urine that had close to 500 for glucose present.  She is a diabetic but no medications, no sugar checks and has been about 8 months since last A1c checked.    Glendy recently had a fall with nasal fracture and significant bruising to face.  The face bruising is faded and seems to be doing fair.  Staff now have her wear tennis shoes for safety with walking.  Usually wore CROCS.    Glendy does reside in the memory care unit.  Spouse is involved in her care.  She does talk negative of him at times and do not know how much is true as it could be a fixation in her head.    Glendy was at a activity and now back.  Is sitting in her recliner.  Answering questions to the best of her knowledge.    Allergies, and PMH/PSH reviewed in EPIC today.    Current Outpatient Medications   Medication Sig Dispense Refill     metFORMIN (GLUCOPHAGE) 500 MG tablet Take 1 tablet (500 mg) by mouth 2 times daily (with meals)       acetaminophen (TYLENOL) 500 MG tablet Take 500 mg by mouth every 4 hours as needed for mild pain       amLODIPine (NORVASC) 5 MG tablet Take 2 tablets (10 mg) by mouth daily 30 tablet 0     aspirin (ASA) 325 MG tablet Take 1  "tablet (325 mg) by mouth daily 30 tablet 0     atorvastatin (LIPITOR) 40 MG tablet Take 1 tablet (40 mg) by mouth daily 30 tablet 0     Bioflavonoid Products (VITAMIN C) CHEW Take 1 chew tab by mouth daily 30 tablet 0     chlorhexidine (PERIDEX) 0.12 % solution Swish and spit 15 mLs in mouth 2 times daily 118 mL 0     citalopram (CELEXA) 10 MG tablet Take 1 tablet (10 mg) by mouth daily       LORazepam (ATIVAN) 0.5 MG tablet Take 1 tablet (0.5 mg) by mouth every 6 hours as needed for anxiety 10 tablet 0     losartan (COZAAR) 100 MG tablet Take 1 tablet (100 mg) by mouth daily 30 tablet 0     metoprolol tartrate (LOPRESSOR) 100 MG tablet Take 1 tablet (100 mg) by mouth 2 times daily       nitroGLYcerin (NITROSTAT) 0.4 MG sublingual tablet Place 1 tablet (0.4 mg) under the tongue every 5 minutes as needed for chest pain 10 tablet 0     Omega-3 Fatty Acids (FISH OIL ADULT GUMMIES) 113.5 MG CHEW Take 444 mg by mouth daily       Vitamin E 100 units TABS Take 100 Units by mouth daily 30 tablet 0       REVIEW OF SYSTEMS:  4 point ROS including Respiratory, CV, GI and , other than that noted in the HPI,  is negative    Objective:   BP (!) 154/66   Pulse 59   Temp 97.9  F (36.6  C)   Resp 13   Ht 1.651 m (5' 5\")   Wt 87.5 kg (192 lb 12.8 oz)   LMP  (LMP Unknown)   SpO2 97%   BMI 32.08 kg/m    Alert and oriented to self and surroundings.  Smiling, clear speech.  Does not really sound nasally.  Healing small laceration on bridge of nose.    Heart rate regular and strong.  Lungs are clear.    Fading bruising from eyes and around upper lip.    Ambulates with a walker and typically does not need assist.    Abdomen is soft and non-tender.      Labs:    Lab Results   Component Value Date    A1C 8.1 08/09/2021    A1C 6.1 12/27/2020     Component      Latest Ref Rng & Units 9/17/2021   Color Urine      Colorless, Straw, Light Yellow, Yellow Yellow   Appearance Urine      Clear Clear   Glucose Urine      Negative mg/dL >499 " (A)   Bilirubin Urine      Negative Negative   Ketones Urine      Negative mg/dL Negative   Specific Gravity Urine      1.003 - 1.035 1.025   Blood Urine      Negative Negative   pH Urine      5.0 - 7.0 5.0   Protein Albumin Urine      Negative mg/dL Negative   Urobilinogen mg/dL      Normal, 2.0 mg/dL Normal   Nitrite Urine      Negative Negative   Leukocyte Esterase Urine      Negative Negative   Mucus Urine      None Seen /LPF Present (A)   Calcium Oxalate      None Seen /HPF Few (A)   RBC Urine      <=2 /HPF 3 (H)   WBC Urine      <=5 /HPF 6 (H)   Squamous Epithelial /HPF Urine      <=1 /HPF 1       Assessment/Plan:  (E11.21,  N18.32) Type 2 diabetes mellitus with stage 3b chronic kidney disease, without long-term current use of insulin (H)  (primary encounter diagnosis)\  (N18.31) Stage 3a chronic kidney disease  Comment: currently no orders in place.  Will try Metformin first at 500mg twice a day.  Will check blood sugars twice a day for 3 weeks and then update NP.    Due to her kidney function, will get a BMP in two weeks.    Plan: metFORMIN (GLUCOPHAGE) 500 MG tablet    (S02.2XXD) Open fracture of nasal bone with routine healing, subsequent encounter  (S00.83XD) Traumatic hematoma of forehead, subsequent encounter  Comment: healing.  Minimal pain.  No headaches.  Continue to monitor for changes neurologically    (F32.9) Reactive depression  (F01.50) Vascular dementia without behavioral disturbance (H)  Comment: - doing well today.  Informed her of the medication being added.  No need to change her diet.  Let her enjoy things in life at her age - food.        Orders:  1.  Metformin 500mg po twice a day  2.  Blood sugar checks twice a day for 3 weeks and then update NP  3.  BMP in two weeks on a regular lab day - Type 2 DM    Electronically signed by: LEATHA Hagen CNP

## 2021-10-04 NOTE — PROGRESS NOTES
AUDIOLOGY REPORT    SUBJECTIVE:  Loni Ybarra is a 80 year old female who was seen in the Audiology Clinic at the [facility] on 9/23/2021  for a follow-up after her hearing aids were dropped of and to discuss other hearing aid options. Previous results have revealed mild to moderate sensorineural hearing loss bilaterally.  Loni currently uses binaural ReSound  in the ear hearing aids.    OBJECTIVE:   The patient's hearing aids were maintained, to clean receivers and replace filters, and vacuum leif ports. Domes replaced. Cleaned battery contacts.  Biologic listening check indicated appropriate function right and left.  Attempted to connect to ReSound programming software, hearing aids could not be detected suggesting locked software.    Discussed this with the patient. Also discussed replacement hearing aid options and recommended binaural Signia 2 X Pure Charge and Go RICs in granite with custom molds.  After discussion of options with her  they opted to use her current hearing aids for a week or so before determining if they would proceed with new hearing aids.    ASSESSMENT:   Maintenance to hearing aid was completed as outlined above.     PLAN:  Loni will follow up in 1-2 weeks and left me know her preference with regard to getting replacement hearing aids, otherwise as needed. Please call this clinic with any questions regarding today s appointment.    Akin Blanco.  [place] Licensed Audiologist #[id number]
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
## 2021-10-18 NOTE — ED NOTES
Repositioned in bed.  Awaiting POND, warm blanket given and updated on plan, once again re-oriented to environment and situation.
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
## 2021-10-19 NOTE — TELEPHONE ENCOUNTER
Mr Ybarra called this NP asking for help to get an appointment sooner than later for Arvind to see GYN    Called placed to [place] and able to get an appointment set up for tomorrow in [place] at 140pm with OB/GYN but specifically for GYN issues    Called Mr Ybarra back and he will take her down to the appointment and thankful for getting her in sooner.  Appointment for November is not cancelled as if that appointment is needed then it is set.  He knows he can discontinue it at a later date.    Glendy resides in memory care in [facility] and is talkative but forgetful.  She ambulates with a walker.      Electronically signed by Jackie Hawkins RN, CNP

## 2021-10-20 NOTE — PROGRESS NOTES
[facility] GERIATRIC SERVICES    Chief Complaint   Patient presents with     RECHECK     HPI:  Loni Ybarra is a 80 year old  (1940), who is being seen today for an episodic care visit at: [facility] [[id number]]. Today's concern is:     Diagnoses       Codes Comments    Type 2 diabetes mellitus with stage 3a chronic kidney disease, without long-term current use of insulin (H)    -  Primary E11.22, N18.31     Stage 3a chronic kidney disease (H)     N18.31     Vascular dementia without behavioral disturbance (H)     F01.50         Came to see Glendy today to evaluate her blood sugars after adding Metformin in September due to glucose spilling into her urine.  Back in August her A1c was at 8.1%    Found Glendy in the dining room about to sit down at the table.  She ambulates with her pink 4WW.  States she is feeling pretty good.  Typically Glendy is a happy go person.  Spouse comes to take her out on occasion.  She is not a risk of leaving but needs supervision with cares.      Allergies, and PMH/PSH reviewed in EPIC today.    Current Outpatient Medications   Medication Sig Dispense Refill     acetaminophen (TYLENOL) 500 MG tablet Take 500 mg by mouth every 4 hours as needed for mild pain        Ascorbic Acid (VITAMIN C) 500 MG CAPS Take 1 capsule by mouth daily before breakfast       aspirin (ASA) 325 MG tablet Take 1 tablet (325 mg) by mouth daily 30 tablet 0     atorvastatin (LIPITOR) 40 MG tablet Take 1 tablet (40 mg) by mouth daily 30 tablet 0     Bioflavonoid Products (VITAMIN C) CHEW Take 1 chew tab by mouth daily 30 tablet 0     chlorhexidine (PERIDEX) 0.12 % solution SWISH AND SPIT 15ML IN MOUTH TWICE DAILY 473 mL 11     citalopram (CELEXA) 10 MG tablet Take 1 tablet (10 mg) by mouth daily       LORazepam (ATIVAN) 0.5 MG tablet TAKE 1 TABLET BY MOUTH EVERY 6 HOURS AS NEEDED DX ANXIETY 10 tablet 5     losartan (COZAAR) 100 MG tablet Take 1 tablet (100 mg) by mouth daily 30  "tablet 0     medroxyPROGESTERone (PROVERA) 10 MG tablet TAKE 1 TABLET BY MOUTH EVERY DAY 31 tablet 11     metFORMIN (GLUCOPHAGE) 500 MG tablet Take 2 tablets (1,000 mg) by mouth 2 times daily (with meals)       metoprolol tartrate (LOPRESSOR) 100 MG tablet Take 1 tablet (100 mg) by mouth 2 times daily       nitroGLYcerin (NITROSTAT) 0.4 MG sublingual tablet Place 1 tablet (0.4 mg) under the tongue every 5 minutes as needed for chest pain 10 tablet 0     Omega-3 Fatty Acids (FISH OIL ADULT GUMMIES) 113.5 MG CHEW Take 444 mg by mouth daily       Vitamin E 100 units TABS Take 100 Units by mouth daily 30 tablet 0       REVIEW OF SYSTEMS:  4 point ROS including Respiratory, CV, GI and , other than that noted in the HPI,  is negative.  Denies any urinary burning.      Objective:   BP (!) 155/77   Pulse 64   Temp 97.9  F (36.6  C)   Resp 18   Ht 1.651 m (5' 5\")   Wt 87.8 kg (193 lb 9.6 oz)   LMP  (LMP Unknown)   SpO2 98%   BMI 32.22 kg/m    Alert and oriented to self and surroundings.  Time can be vague.  Skin is pink, warm and dry.  Heart rate regular and strong.  Lungs are clear.  No use of oxygen.    Able to take self to bathroom.      Blood sugars twice a day:  7am = 120-180's  5pm = 130-260's      Most Recent Hemoglobin A1c:Recent Labs   Lab Test 08/09/21  0646   A1C 8.1*     Component      Latest Ref Rng & Units 10/18/2021   Sodium      133 - 144 mmol/L 139   Potassium      3.4 - 5.3 mmol/L 3.9   Chloride      94 - 109 mmol/L 107   Carbon Dioxide      20 - 32 mmol/L 28   Anion Gap      3 - 14 mmol/L 4   Urea Nitrogen      7 - 30 mg/dL 19   Creatinine      0.52 - 1.04 mg/dL 0.85   Calcium      8.5 - 10.1 mg/dL 9.3   Glucose      70 - 99 mg/dL 156 (H)   GFR Estimate      >60 mL/min/1.73m2 64       Assessment/Plan:  1. Type 2 diabetes mellitus with stage 3a chronic kidney disease, without long-term current use of insulin (H)    2. Stage 3a chronic kidney disease (H)    3. Vascular dementia without behavioral " disturbance (H)      Feel there is room to push her Metformin to 1000mg twice a day.  Overall her sugars are good in the morning.  Labs acceptable.  Will keep blood sugars BID   Evaluate sugars in 3 weeks with NP    Orders:  1.  Increase Metformin 500mg 2 tabs po twice a day for Type 2 DM  2.  Continue with BID sugar checks for diabetes  3.  Evaluate blood sugars in 3 weeks with NP    Electronically signed by: LEATHA Hagen CNP

## 2021-10-20 NOTE — LETTER
10/20/2021        RE: Loni Ybarra  [address]  [place] [address]        [facility]    Chief Complaint   Patient presents with     RECHECK     HPI:  Loni Ybarra is a 80 year old  (1940), who is being seen today for an episodic care visit at: [facility] (S) [[id number]]. Today's concern is:     Diagnoses       Codes Comments    Type 2 diabetes mellitus with stage 3a chronic kidney disease, without long-term current use of insulin (H)    -  Primary E11.22, N18.31     Stage 3a chronic kidney disease (H)     N18.31     Vascular dementia without behavioral disturbance (H)     F01.50         Came to see Glendy today to evaluate her blood sugars after adding Metformin in September due to glucose spilling into her urine.  Back in August her A1c was at 8.1%    Found Glendy in the dining room about to sit down at the table.  She ambulates with her pink 4WW.  States she is feeling pretty good.  Typically Glendy is a happy go person.  Spouse comes to take her out on occasion.  She is not a risk of leaving but needs supervision with cares.      Allergies, and PMH/PSH reviewed in EPIC today.    Current Outpatient Medications   Medication Sig Dispense Refill     acetaminophen (TYLENOL) 500 MG tablet Take 500 mg by mouth every 4 hours as needed for mild pain        Ascorbic Acid (VITAMIN C) 500 MG CAPS Take 1 capsule by mouth daily before breakfast       aspirin (ASA) 325 MG tablet Take 1 tablet (325 mg) by mouth daily 30 tablet 0     atorvastatin (LIPITOR) 40 MG tablet Take 1 tablet (40 mg) by mouth daily 30 tablet 0     Bioflavonoid Products (VITAMIN C) CHEW Take 1 chew tab by mouth daily 30 tablet 0     chlorhexidine (PERIDEX) 0.12 % solution SWISH AND SPIT 15ML IN MOUTH TWICE DAILY 473 mL 11     citalopram (CELEXA) 10 MG tablet Take 1 tablet (10 mg) by mouth daily       LORazepam (ATIVAN) 0.5 MG tablet TAKE 1 TABLET BY MOUTH EVERY 6 HOURS AS NEEDED DX  "ANXIETY 10 tablet 5     losartan (COZAAR) 100 MG tablet Take 1 tablet (100 mg) by mouth daily 30 tablet 0     medroxyPROGESTERone (PROVERA) 10 MG tablet TAKE 1 TABLET BY MOUTH EVERY DAY 31 tablet 11     metFORMIN (GLUCOPHAGE) 500 MG tablet Take 2 tablets (1,000 mg) by mouth 2 times daily (with meals)       metoprolol tartrate (LOPRESSOR) 100 MG tablet Take 1 tablet (100 mg) by mouth 2 times daily       nitroGLYcerin (NITROSTAT) 0.4 MG sublingual tablet Place 1 tablet (0.4 mg) under the tongue every 5 minutes as needed for chest pain 10 tablet 0     Omega-3 Fatty Acids (FISH OIL ADULT GUMMIES) 113.5 MG CHEW Take 444 mg by mouth daily       Vitamin E 100 units TABS Take 100 Units by mouth daily 30 tablet 0       REVIEW OF SYSTEMS:  4 point ROS including Respiratory, CV, GI and , other than that noted in the HPI,  is negative.  Denies any urinary burning.      Objective:   BP (!) 155/77   Pulse 64   Temp 97.9  F (36.6  C)   Resp 18   Ht 1.651 m (5' 5\")   Wt 87.8 kg (193 lb 9.6 oz)   LMP  (LMP Unknown)   SpO2 98%   BMI 32.22 kg/m    Alert and oriented to self and surroundings.  Time can be vague.  Skin is pink, warm and dry.  Heart rate regular and strong.  Lungs are clear.  No use of oxygen.    Able to take self to bathroom.      Blood sugars twice a day:  7am = 120-180's  5pm = 130-260's      Most Recent Hemoglobin A1c:Recent Labs   Lab Test 08/09/21  0646   A1C 8.1*     Component      Latest Ref Rng & Units 10/18/2021   Sodium      133 - 144 mmol/L 139   Potassium      3.4 - 5.3 mmol/L 3.9   Chloride      94 - 109 mmol/L 107   Carbon Dioxide      20 - 32 mmol/L 28   Anion Gap      3 - 14 mmol/L 4   Urea Nitrogen      7 - 30 mg/dL 19   Creatinine      0.52 - 1.04 mg/dL 0.85   Calcium      8.5 - 10.1 mg/dL 9.3   Glucose      70 - 99 mg/dL 156 (H)   GFR Estimate      >60 mL/min/1.73m2 64       Assessment/Plan:  1. Type 2 diabetes mellitus with stage 3a chronic kidney disease, without long-term current use of " insulin (H)    2. Stage 3a chronic kidney disease (H)    3. Vascular dementia without behavioral disturbance (H)      Feel there is room to push her Metformin to 1000mg twice a day.  Overall her sugars are good in the morning.  Labs acceptable.  Will keep blood sugars BID   Evaluate sugars in 3 weeks with NP    Orders:  1.  Increase Metformin 500mg 2 tabs po twice a day for Type 2 DM  2.  Continue with BID sugar checks for diabetes  3.  Evaluate blood sugars in 3 weeks with NP    Electronically signed by: LEATHA Hagen CNP             Sincerely,        LEATHA Hagen CNP
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
## 2021-11-05 NOTE — TELEPHONE ENCOUNTER
Reason for Call:  Other call back    Detailed comments: pts  calling to cancel/reschedule procedure scheduled 11/8 with Dr. Velasquez    Phone Number Patient can be reached at: Home number on file [phone] (home)    Best Time: any    Can we leave a detailed message on this number? YES    Call taken on 11/5/2021 at 2:24 PM by Kayla Vicente
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
## 2021-11-25 NOTE — PATIENT INSTRUCTIONS
Patient Education   Personalized Prevention Plan  You are due for the preventive services outlined below.  Your care team is available to assist you in scheduling these services.  If you have already completed any of these items, please share that information with your care team to update in your medical record.  Health Maintenance Due   Topic Date Due     Hepatitis B Vaccine (1 of 3 - Risk 3-dose series) 11/18/1958     Zoster (Shingles) Vaccine (1 of 2) 11/18/1989     Eye Exam  05/20/2020     Cholesterol Lab  06/04/2020     Kidney Microalbumin Urine Test  06/04/2020     FALL RISK ASSESSMENT  06/04/2020     A1C Lab  06/27/2020     Flu Vaccine (1) 09/01/2020     Basic Metabolic Panel  09/10/2020        
no loss of consciousness, no headache, no sensory deficits, and no focal weakness.
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
## 2021-11-29 NOTE — ANESTHESIA POSTPROCEDURE EVALUATION
Patient: Loni Ybarra    Procedure: Procedure(s):  HYSTEROSCOPY, WITH DILATION AND CURETTAGE OF UTERUS       Diagnosis:Endometrial thickening on ultrasound [R93.89]  Diagnosis Additional Information: No value filed.    Anesthesia Type:  MAC    Note:  Disposition: Outpatient   Postop Pain Control: Uneventful            Sign Out: Well controlled pain   PONV: No   Neuro/Psych: Uneventful            Sign Out: Acceptable/Baseline neuro status   Airway/Respiratory: Uneventful            Sign Out: Acceptable/Baseline resp. status   CV/Hemodynamics: Uneventful            Sign Out: Acceptable CV status   Other NRE: NONE   DID A NON-ROUTINE EVENT OCCUR? No    Event details/Postop Comments:  Pt was happy with anesthesia care.  No complications.  I will follow up with the pt if needed.           Last vitals:  Vitals Value Taken Time   /58 11/29/21 0845   Temp 97.3  F (36.3  C) 11/29/21 0825   Pulse 56 11/29/21 0845   Resp 16 11/29/21 0825   SpO2 94 % 11/29/21 0848   Vitals shown include unvalidated device data.    Electronically Signed By: LEATHA Weller CRNA  November 29, 2021  8:50 AM

## 2021-11-29 NOTE — ANESTHESIA CARE TRANSFER NOTE
Patient: Loni Ybarra    Procedure: Procedure(s):  HYSTEROSCOPY, WITH DILATION AND CURETTAGE OF UTERUS       Diagnosis: Endometrial thickening on ultrasound [R93.89]  Diagnosis Additional Information: No value filed.    Anesthesia Type:   MAC     Note:    Oropharynx: oropharynx clear of all foreign objects and spontaneously breathing  Level of Consciousness: drowsy  Oxygen Supplementation: face mask    Independent Airway: airway patency satisfactory and stable  Dentition: dentition unchanged  Vital Signs Stable: post-procedure vital signs reviewed and stable  Report to RN Given: handoff report given  Patient transferred to: Phase II    Handoff Report: Identifed the Patient, Identified the Reponsible Provider, Reviewed the pertinent medical history, Discussed the surgical course, Reviewed Intra-OP anesthesia mangement and issues during anesthesia, Set expectations for post-procedure period and Allowed opportunity for questions and acknowledgement of understanding      Vitals:  Vitals Value Taken Time   /70 11/29/21 0819   Temp     Pulse 58 11/29/21 0819   Resp     SpO2 100 % 11/29/21 0821   Vitals shown include unvalidated device data.    Electronically Signed By: LEATHA Weller CRNA  November 29, 2021  8:21 AM

## 2021-11-29 NOTE — OP NOTE
OPERATIVE NOTE    Surgeon: Ron    Preoperative diagnosis: post-Menopausal bleeding    Postoperative diagnosis: same      Procedure: Hysteroscopy, dilatation and curettage    Anesthesia: Monitored anesthesia care (MAC)      Patient profile: 81-year-old female with postmenopausal bleeding and ultrasound evidence of thickened endometrium    Operative findings: The uterus was approximately 7weeks size and did   not  descend well in the vaginal vault. She would not be a candidate for vaginal hysterectomy unless laparoscopically assisted .the hysteroscopy revealed no polyps or masses in the endometrial cavity. However the endometrium was quite thick and bloody. It looks suspicious for malignancy or hyperplasia.    Operative description: After the establishment of satisfactory monitored   anesthesia, the patient was prepped and draped in the usual fashion for vaginal surgery and the bladder was drained of urine. An open sided speculum was inserted and the cervix was grasped anteriorly with a single-tooth tenaculum and dilated with Hanks dilators and then hysteroscopy was performed using  100ml of normal saline and 50ml was eventually recovered. The endometrium was curetted sharply. Hysteroscopy revealed findings as listed above in operative findings section. The endometrium appeared thickened but I didn't see any masses just thick bloody endometrium suspicious for hyperplasia or malignancy-curettings were sent for pathology. Then the tenaculum was removed and the puncture sites were cauterized with silver nitrate.  The speculum was removed and the final sponge needle and instrument counts were reported to me as correct. The patient was taken to the recovery room in good condition without complications. Estimated blood loss was 50 ml.    I will plan to call her  in the next several days to check on her postoperative progress(she has dementia). She will be instructed to recheck acutely if she experiences any  heavy vaginal bleeding greater than that of usual menses or fever or increased pain or vomiting.        Torrey Velasquez MD

## 2021-11-29 NOTE — DISCHARGE INSTRUCTIONS
Discharge instructions for Dr. Velasquez:         I will call you as soon as I see the pathology report.  You do not have to come in for your postoperative check because of the logistical issues involved in your case.       If you have unusually heavy vaginal bleeding, severe nausea with vomiting, or increased pain, or fever, call us at that same number to be seen earlier, or go to the emergency room if after hours or we are unavailable.          you should avoid intercourse for 1   week   after surgery.           you should not drive for 1 day   after surgery  .           you should not shower today but may resume showering tomorrow after the effects of anesthesia wear off.  Do not take a bath for 1  week   after surgery.        If you have questions do not hesitate to call the office at [phone]         Torrey Velasquez MD, FACOG, FAAFP              , OB/GYN  Department.    [facility]   Adult Discharge Orders & Instructions     For 24 hours after surgery    1. Get plenty of rest.  A responsible adult must stay with you for at least 24 hours after you leave the hospital.   2. Do not drive or use heavy equipment.  If you have weakness or tingling, don't drive or use heavy equipment until this feeling goes away.  3. Do not drink alcohol.  4. Avoid strenuous or risky activities.  Ask for help when climbing stairs.   5. You may feel lightheaded.  If so, sit for a few minutes before standing.  Have someone help you get up.   6. You may have a slight fever. Call the doctor if your fever is over 100 F (37.7 C) (taken under the tongue) or lasts longer than 24 hours.  7. You may have a dry mouth, a sore throat, muscle aches or trouble sleeping.  These should go away after 24 hours.  8. Do not make important or legal decisions.  We don t expect you to have any problems from the surgery or treatment you had today. Just in case, here s what to do if you have pain, upset stomach (nausea),  bleeding or infection:  Pain:  Take medicines your doctor has prescribed or over-the-counter medicine they have suggested. Resting and using ice packs can help, too. For surgery on an arm or leg, raise it on a pillow to ease swelling. Call your doctor if these methods don t work.  Copyright [employer], Licensed under CC4.0 VENNCOMM  Upset stomach (nausea):  Take anti-nausea medicine approved by your doctor. Drink clear liquids like apple juice, ginger ale, broth or 7-Up. Be sure to drink enough fluids. Rest can help, too. Move to normal foods when you re ready.   Bleeding:  In the first 24 hours, you may see a little blood on your dressing, about the size of a quarter. You don t need to worry about this much blood, but if the blood spot keeps getting bigger:    Put pressure on the wound if you can, AND    Call your doctor.  Copyright [employer], Licensed under CC4.0 VENNCOMM  Fever/Infection: Please call your doctor if you have any of these signs:    Redness    Swelling    Wound feels warm    Pain gets worse    Bad-smelling fluid leaks from wound    Fever or chills  Call your doctor for any of the followin.  It has been over 8 to 10 hours since surgery and you are still not able to urinate (pass water).    2.  Headache for over 24 hours.    3.  Numbness, tingling or weakness in your legs the day after surgery (if you had spinal anesthesia).    Nurse advice line: [phone] (24 hour)

## 2021-11-30 NOTE — PROGRESS NOTES
I called her  for a progress report this morning.  He told me that he had spoken with the nursing home and she seems to be doing well from a physical standpoint.  I informed him that I will call him back when I see the pathology report and then we will make future plans from there.JESS Velasquez MD

## 2021-12-04 NOTE — RESULT ENCOUNTER NOTE
I called her  with the dx and advised him that we should place a gyn-onc consult- the order has been placed. I asked him to call me by Wednesday if he has not heard back from that dept. JESS Velasquez MD

## 2021-12-06 NOTE — TELEPHONE ENCOUNTER
Patients  called,( Jami), they are wondering if patient needs to keep her appointment tomorrow.     said that you were going to do a referral to OB/Gyn Oncology at the [facility], so he is wondering why it is necessary to come to see you tomorrow.    Patient and  would appreciate a phone call to clarify.    Maria Teresa Burger XRO/

## 2021-12-07 NOTE — RESULT ENCOUNTER NOTE
I see that she got an appointment with gynecologic oncology.  I spoke with her  today and verified that the appointment has been set up.JESS Velasquez MD

## 2021-12-07 NOTE — TELEPHONE ENCOUNTER
RECORDS STATUS - ALL OTHER DIAGNOSIS      RECORDS RECEIVED FROM: [facility]   DATE RECEIVED: 12/7   NOTES STATUS DETAILS   OFFICE NOTE from referring provider Torrey Gray MD in  FAMILY PRACTICE: 10/20/21   OFFICE NOTE from medical oncologist NA    DISCHARGE SUMMARY from hospital [facility] 11/29/21   DISCHARGE REPORT from the ER [facility] 10/18/21   OPERATIVE REPORT [facility] 11/29/21: Hysteroscopy, D & C   MEDICATION LIST [facility]    CLINICAL TRIAL TREATMENTS TO DATE NA    LABS     PATHOLOGY REPORTS [facility] 11/29/21   ANYTHING RELATED TO DIAGNOSIS Epic 11/29/21   GENONOMIC TESTING     TYPE: NA    IMAGING (NEED IMAGES & REPORT)     CT SCANS NA    MRI NA    MAMMO NA    ULTRASOUND PACS 10/18/21: Epic   PET NA
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
## 2021-12-08 NOTE — NURSING NOTE
"Oncology Rooming Note    December 8, 2021 11:02 AM   Loni Ybarra is a 81 year old female who presents for:    Chief Complaint   Patient presents with     Oncology Clinic Visit     Malignant neoplasm of myometrium      Initial Vitals: /79   Pulse 60   Temp 98  F (36.7  C) (Oral)   Resp 18   Wt 86.2 kg (190 lb)   LMP  (LMP Unknown)   SpO2 97%   BMI 31.62 kg/m   Estimated body mass index is 31.62 kg/m  as calculated from the following:    Height as of 11/24/21: 1.651 m (5' 5\").    Weight as of this encounter: 86.2 kg (190 lb). Body surface area is 1.99 meters squared.  No Pain (0) Comment: Data Unavailable   No LMP recorded (lmp unknown). Patient is postmenopausal.  Allergies reviewed: Yes  Medications reviewed: Yes    Medications: Medication refills not needed today.  Pharmacy name entered into EPIC:    [facility] - [place], [place] - [facility] - [place], [place] - [address] [facility]-FA[facility] ONLY - [facility] [place] [facility] - [place], [place] - [address] AT PORTAL TO REGISTERED [place]  [facility] - [place], [place] - [address]  [facility] - [place], [place] - [address] [facility] - [place], [place] - [address]    Clinical concerns: None       Erin Pinzon LPN            "
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
## 2021-12-15 NOTE — LETTER
12/15/2021        RE: Loni Ybarra  [address]  [place] [address]        [facility]    Chief Complaint   Patient presents with     RECHECK     HPI:  Loni Ybarra is a 81 year old  (1940), who is being seen today for an episodic care visit at: [facility] [[id number]]. Today's concern is:     Diagnoses       Codes Comments    Endometrial cancer (H)    -  Primary C54.1     Vascular dementia without behavioral disturbance (H)     F01.50         Had received a phone call from Glendy's spouse asking if this NP could speak to her about her Endometrial Cancer.  Last week Glendy, her spouse, her brother and sister in law met with the oncologist at [facility].  It was explained what the options were for treatment but Glendy did not take it very well.  Feel with her Dementia it takes her time to process things compared to someone without memory Loss.    Glendy's spouse felt she she be involved in the decision making and the other family members did not feel she should have a say and taker her right for the surgery.  Have spent time talking to the spouse that for Glendy that would not be a good idea to bring her to surgery without telling her.  Glendy does have memory loss but does comprehend things.  She continues to be mobile, able to do or participate in her ADLs.  She lives on a memory care unit that is secure but does not attempt to elope.  She will go out with her spouse many times.  Feel she is too high functioning for a memory care at a long term care facility life where she came from and this NP took care of her at that facility as a provider.      Today sat down with Glendy for 30 minutes in regards to what she knows and how she is feeling.  She did not cry during the conversation.  She was calm and sad.  She states she has told some of her friends that have had the same issues and they assured her she would be fine.  Can see were her memory issues come in  as it seems vague with an answer.      Explained to her the options that were in the note forwarded to this NP.  Seems like most would opt for surgery but the surgeon noted that she would need TCU care afterwards.  She could have a Pre-op by this NP or go to the [facility] where they have a pre-op team.  For Glendy it helped to know the ultimate goal is to get her to come back here to live.  Glendy asked how long recover takes.  Many years ago if she was working she would be off for 6 or more weeks.  She will still have restrictions for lifting, pulling, pushing and so on but deserves to have therapies to help with strengthening after surgery.    Feel Glendy as a decent quality of life and the quickest way to remove this problem would be surgery.  Glendy did say she was leaning towards surgery but encouraged her to speak with her spouse because then he would need to contact the clinic.  Talked about what if she would not do anything as well.  If that was the case then it would be symptom management.  Best chance of survival is to be sooner with treatment versus later.      Allergies, and PMH/PSH reviewed in EPIC today.    Current Outpatient Medications   Medication Sig Dispense Refill     acetaminophen (TYLENOL) 500 MG tablet Take 500 mg by mouth every 4 hours as needed for mild pain        Ascorbic Acid (VITAMIN C) 500 MG CAPS Take 1 capsule by mouth daily before breakfast       aspirin (ASA) 325 MG tablet Take 1 tablet (325 mg) by mouth daily 30 tablet 0     atorvastatin (LIPITOR) 40 MG tablet Take 1 tablet (40 mg) by mouth daily 30 tablet 0     Bioflavonoid Products (VITAMIN C) CHEW Take 1 chew tab by mouth daily 30 tablet 0     chlorhexidine (PERIDEX) 0.12 % solution SWISH AND SPIT 15ML IN MOUTH TWICE DAILY 473 mL 11     citalopram (CELEXA) 10 MG tablet Take 1 tablet (10 mg) by mouth daily       LORazepam (ATIVAN) 0.5 MG tablet TAKE 1 TABLET BY MOUTH EVERY 6 HOURS AS NEEDED DX ANXIETY 10 tablet 5     losartan (COZAAR) 100 MG  "tablet Take 1 tablet (100 mg) by mouth daily 30 tablet 0     medroxyPROGESTERone (PROVERA) 10 MG tablet Take 1 tablet (10 mg) by mouth daily 60 tablet 0     metFORMIN (GLUCOPHAGE) 500 MG tablet Take 2 tablets (1,000 mg) by mouth 2 times daily (with meals)       metoprolol tartrate (LOPRESSOR) 100 MG tablet Take 1 tablet (100 mg) by mouth 2 times daily       nitroGLYcerin (NITROSTAT) 0.4 MG sublingual tablet Place 1 tablet (0.4 mg) under the tongue every 5 minutes as needed for chest pain 10 tablet 0     Omega-3 Fatty Acids (FISH OIL ADULT GUMMIES) 113.5 MG CHEW Take 444 mg by mouth daily       Vitamin E 100 units TABS Take 100 Units by mouth daily 30 tablet 0       REVIEW OF SYSTEMS:  4 point ROS including Respiratory, CV, GI and , other than that noted in the HPI,  is negative    Objective:   /69   Pulse 63   Temp 98.4  F (36.9  C)   Resp 18   Ht 1.651 m (5' 5\")   Wt 83.1 kg (183 lb 3.2 oz)   LMP  (LMP Unknown)   SpO2 97%   BMI 30.49 kg/m    Gelndy was in a activity but easily could get her to come back to the secured unit to speak with her.  Ambulates with a very nice 4WW.    Speech is clear and she can follow conversation as well as contribute to it too.    Heart rate regular and strong.  No shortness of breath.  Lungs are clear.       Most Recent 3 CBC's:Recent Labs   Lab Test 11/26/21  1355 11/05/21  0630 10/18/21  1548 09/20/21  0615 09/20/21  0615 12/29/20  0552   WBC  --   --  8.2  --  8.7 9.0   HGB 13.0 13.0 13.0   < > 11.8 12.7   MCV  --   --  95  --  94 92   PLT  --   --  194  --  198 183    < > = values in this interval not displayed.     Most Recent 3 BMP's:Recent Labs   Lab Test 11/29/21  0636 11/26/21  1355 11/05/21  0630 10/18/21  1548   NA  --  137 141 139   POTASSIUM  --  3.8 4.2 3.9   CHLORIDE  --  103 110* 107   CO2  --  28 27 28   BUN  --  15 19 19   CR  --  0.95 1.03 0.85   ANIONGAP  --  6 4 4   NANCY  --  9.5 10.1 9.3   * 240* 98 156*       Assessment/Plan:  (C54.1) " Endometrial cancer (H)  (primary encounter diagnosis)  (F01.50) Vascular dementia without behavioral disturbance (H)    Comment: called Glendy's spouse back to let him know that the conversation occurred.  He was appreciative of the talk.  He states they talk about it often but Glendy makes it sound like he will say a few words and be done.  Informed him that seems like she is leaning to do surgery.  Explained the process to her of what after surgery will be like and what may occur to her memory due to anesthesia and transferring from facility to facility.    Encouraged the spouse to ask if she has made a decision and if she wants surgery then to call the clinic to get things started.  The spouse asked if this NP would be willing to speak with the brother and sister in law if needed as their views are different than Glendy's or the spouse.  Glendy needs to have a decision in this or it would be a negative outcome for her not being told if all of a sudden she is being prepped for surgery and she did not have a say.      Plan: Glendy was thankful for the talk.  Encouraged her to make a decision sooner rather than later.  Her spouse comes regularly or talk on the phone.    Orders:  No new orders to the staff.      Electronically signed by: LEATHA Hagen CNP             Sincerely,        LEATHA Hagen CNP

## 2021-12-15 NOTE — PROGRESS NOTES
[facility] GERIATRIC SERVICES    Chief Complaint   Patient presents with     RECHECK     HPI:  Loni Ybarra is a 81 year old  (1940), who is being seen today for an episodic care visit at: [facility] ([facility]) [[id number]]. Today's concern is:     Diagnoses       Codes Comments    Endometrial cancer (H)    -  Primary C54.1     Vascular dementia without behavioral disturbance (H)     F01.50         Had received a phone call from Glendy's spouse asking if this NP could speak to her about her Endometrial Cancer.  Last week Glendy, her spouse, her brother and sister in law met with the oncologist at [facility].  It was explained what the options were for treatment but Glendy did not take it very well.  Feel with her Dementia it takes her time to process things compared to someone without memory Loss.    Glendy's spouse felt she she be involved in the decision making and the other family members did not feel she should have a say and taker her right for the surgery.  Have spent time talking to the spouse that for Glendy that would not be a good idea to bring her to surgery without telling her.  Glendy does have memory loss but does comprehend things.  She continues to be mobile, able to do or participate in her ADLs.  She lives on a memory care unit that is secure but does not attempt to elope.  She will go out with her spouse many times.  Feel she is too high functioning for a memory care at a long term care facility life where she came from and this NP took care of her at that facility as a provider.      Today sat down with Glendy for 30 minutes in regards to what she knows and how she is feeling.  She did not cry during the conversation.  She was calm and sad.  She states she has told some of her friends that have had the same issues and they assured her she would be fine.  Can see were her memory issues come in as it seems vague with an answer.      Explained to her the options that were in the note  forwarded to this NP.  Seems like most would opt for surgery but the surgeon noted that she would need TCU care afterwards.  She could have a Pre-op by this NP or go to the [facility] where they have a pre-op team.  For Glendy it helped to know the ultimate goal is to get her to come back here to live.  Glendy asked how long recover takes.  Many years ago if she was working she would be off for 6 or more weeks.  She will still have restrictions for lifting, pulling, pushing and so on but deserves to have therapies to help with strengthening after surgery.    Feel Glendy as a decent quality of life and the quickest way to remove this problem would be surgery.  Glendy did say she was leaning towards surgery but encouraged her to speak with her spouse because then he would need to contact the clinic.  Talked about what if she would not do anything as well.  If that was the case then it would be symptom management.  Best chance of survival is to be sooner with treatment versus later.      Allergies, and PMH/PSH reviewed in EPIC today.    Current Outpatient Medications   Medication Sig Dispense Refill     acetaminophen (TYLENOL) 500 MG tablet Take 500 mg by mouth every 4 hours as needed for mild pain        Ascorbic Acid (VITAMIN C) 500 MG CAPS Take 1 capsule by mouth daily before breakfast       aspirin (ASA) 325 MG tablet Take 1 tablet (325 mg) by mouth daily 30 tablet 0     atorvastatin (LIPITOR) 40 MG tablet Take 1 tablet (40 mg) by mouth daily 30 tablet 0     Bioflavonoid Products (VITAMIN C) CHEW Take 1 chew tab by mouth daily 30 tablet 0     chlorhexidine (PERIDEX) 0.12 % solution SWISH AND SPIT 15ML IN MOUTH TWICE DAILY 473 mL 11     citalopram (CELEXA) 10 MG tablet Take 1 tablet (10 mg) by mouth daily       LORazepam (ATIVAN) 0.5 MG tablet TAKE 1 TABLET BY MOUTH EVERY 6 HOURS AS NEEDED DX ANXIETY 10 tablet 5     losartan (COZAAR) 100 MG tablet Take 1 tablet (100 mg) by mouth daily 30 tablet 0     medroxyPROGESTERone (PROVERA)  "10 MG tablet Take 1 tablet (10 mg) by mouth daily 60 tablet 0     metFORMIN (GLUCOPHAGE) 500 MG tablet Take 2 tablets (1,000 mg) by mouth 2 times daily (with meals)       metoprolol tartrate (LOPRESSOR) 100 MG tablet Take 1 tablet (100 mg) by mouth 2 times daily       nitroGLYcerin (NITROSTAT) 0.4 MG sublingual tablet Place 1 tablet (0.4 mg) under the tongue every 5 minutes as needed for chest pain 10 tablet 0     Omega-3 Fatty Acids (FISH OIL ADULT GUMMIES) 113.5 MG CHEW Take 444 mg by mouth daily       Vitamin E 100 units TABS Take 100 Units by mouth daily 30 tablet 0       REVIEW OF SYSTEMS:  4 point ROS including Respiratory, CV, GI and , other than that noted in the HPI,  is negative    Objective:   /69   Pulse 63   Temp 98.4  F (36.9  C)   Resp 18   Ht 1.651 m (5' 5\")   Wt 83.1 kg (183 lb 3.2 oz)   LMP  (LMP Unknown)   SpO2 97%   BMI 30.49 kg/m    Glendy was in a activity but easily could get her to come back to the secured unit to speak with her.  Ambulates with a very nice 4WW.    Speech is clear and she can follow conversation as well as contribute to it too.    Heart rate regular and strong.  No shortness of breath.  Lungs are clear.       Most Recent 3 CBC's:Recent Labs   Lab Test 11/26/21  1355 11/05/21  0630 10/18/21  1548 09/20/21  0615 09/20/21  0615 12/29/20  0552   WBC  --   --  8.2  --  8.7 9.0   HGB 13.0 13.0 13.0   < > 11.8 12.7   MCV  --   --  95  --  94 92   PLT  --   --  194  --  198 183    < > = values in this interval not displayed.     Most Recent 3 BMP's:Recent Labs   Lab Test 11/29/21  0636 11/26/21  1355 11/05/21  0630 10/18/21  1548   NA  --  137 141 139   POTASSIUM  --  3.8 4.2 3.9   CHLORIDE  --  103 110* 107   CO2  --  28 27 28   BUN  --  15 19 19   CR  --  0.95 1.03 0.85   ANIONGAP  --  6 4 4   NANCY  --  9.5 10.1 9.3   * 240* 98 156*       Assessment/Plan:  (C54.1) Endometrial cancer (H)  (primary encounter diagnosis)  (F01.50) Vascular dementia without " behavioral disturbance (H)    Comment: called Glendy's spouse back to let him know that the conversation occurred.  He was appreciative of the talk.  He states they talk about it often but Glendy makes it sound like he will say a few words and be done.  Informed him that seems like she is leaning to do surgery.  Explained the process to her of what after surgery will be like and what may occur to her memory due to anesthesia and transferring from facility to facility.    Encouraged the spouse to ask if she has made a decision and if she wants surgery then to call the clinic to get things started.  The spouse asked if this NP would be willing to speak with the brother and sister in law if needed as their views are different than Glendy's or the spouse.  Glendy needs to have a decision in this or it would be a negative outcome for her not being told if all of a sudden she is being prepped for surgery and she did not have a say.      Plan: Glendy was thankful for the talk.  Encouraged her to make a decision sooner rather than later.  Her spouse comes regularly or talk on the phone.    Orders:  No new orders to the staff.      Electronically signed by: LEATHA Hagen CNP

## 2021-12-21 NOTE — TELEPHONE ENCOUNTER
Requested Prescriptions   Pending Prescriptions Disp Refills     medroxyPROGESTERone (PROVERA) 10 MG tablet [Pharmacy Med Name: medroxyPROGESTERone Acetate 10 MG Tablet] 31 tablet 11     Sig: TAKE 1 TABLET BY MOUTH EVERY DAY     Last Written Prescription Date:  10/20/2021  Last Fill Quantity: 60,   # refills: 0  Last Office Visit: 10/20/2021  Future Office visit:       Routing refill request to provider for review/approval because:  Drug not on the [facility], [facility] or [facility] refill protocol or controlled substance    Routing to covering provider to sign.

## 2021-12-21 NOTE — PLAN OF CARE
Behavioral Health  Note    Behavioral Health  Spirituality Group Note    UNIT 3BW    Name: Hollis Barclay YOB: 1970   MRN: [id number] Age: 51 year old      Patient attended -led group, which included discussion of spirituality, coping with illness and mindfulness.    Patient attended group for NC 0.25 hrs.    Ray joined for the last 10-15 minutes of group; pt participated in brief mindfulness activity.    Liss Kerns MDiv  Associate   Pager [phone]  Office [phone]     Occupational Therapy Discharge Summary    Reason for therapy discharge:    Discharged to long term care facility.    Progress towards therapy goal(s). See goals on Care Plan in [facility] electronic health record for goal details.  Goals not met.  Barriers to achieving goals:   discharge from facility.    Therapy recommendation(s):    Continued therapy is recommended.  Rationale/Recommendations:  to progress toward highest level of function within the least restrictive environment.     Rosy Sanabria OTR/L  [facility]  [phone]

## 2022-01-01 ENCOUNTER — ASSISTED LIVING VISIT (OUTPATIENT)
Dept: GERIATRICS | Facility: CLINIC | Age: 82
End: 2022-01-01
Payer: MEDICARE

## 2022-01-01 ENCOUNTER — LAB REQUISITION (OUTPATIENT)
Dept: LAB | Facility: CLINIC | Age: 82
End: 2022-01-01
Payer: MEDICARE

## 2022-01-01 ENCOUNTER — TELEPHONE (OUTPATIENT)
Dept: GERIATRICS | Facility: CLINIC | Age: 82
End: 2022-01-01
Payer: MEDICARE

## 2022-01-01 ENCOUNTER — MEDICAL CORRESPONDENCE (OUTPATIENT)
Dept: HEALTH INFORMATION MANAGEMENT | Facility: CLINIC | Age: 82
End: 2022-01-01
Payer: MEDICARE

## 2022-01-01 VITALS
HEART RATE: 61 BPM | TEMPERATURE: 98.5 F | DIASTOLIC BLOOD PRESSURE: 50 MMHG | OXYGEN SATURATION: 94 % | RESPIRATION RATE: 24 BRPM | HEIGHT: 65 IN | SYSTOLIC BLOOD PRESSURE: 101 MMHG | BODY MASS INDEX: 27.84 KG/M2 | WEIGHT: 167.1 LBS

## 2022-01-01 VITALS
HEIGHT: 65 IN | OXYGEN SATURATION: 97 % | SYSTOLIC BLOOD PRESSURE: 104 MMHG | BODY MASS INDEX: 27.16 KG/M2 | RESPIRATION RATE: 18 BRPM | TEMPERATURE: 97.1 F | DIASTOLIC BLOOD PRESSURE: 50 MMHG | HEART RATE: 67 BPM | WEIGHT: 163 LBS

## 2022-01-01 VITALS
BODY MASS INDEX: 27.16 KG/M2 | OXYGEN SATURATION: 97 % | TEMPERATURE: 97.1 F | HEIGHT: 65 IN | DIASTOLIC BLOOD PRESSURE: 50 MMHG | WEIGHT: 163 LBS | SYSTOLIC BLOOD PRESSURE: 104 MMHG | RESPIRATION RATE: 16 BRPM | HEART RATE: 57 BPM

## 2022-01-01 VITALS
RESPIRATION RATE: 16 BRPM | DIASTOLIC BLOOD PRESSURE: 50 MMHG | HEIGHT: 65 IN | WEIGHT: 160 LBS | HEART RATE: 57 BPM | BODY MASS INDEX: 26.66 KG/M2 | SYSTOLIC BLOOD PRESSURE: 104 MMHG | TEMPERATURE: 98.5 F | OXYGEN SATURATION: 95 %

## 2022-01-01 VITALS
RESPIRATION RATE: 18 BRPM | OXYGEN SATURATION: 97 % | HEART RATE: 63 BPM | BODY MASS INDEX: 29.22 KG/M2 | TEMPERATURE: 98.7 F | SYSTOLIC BLOOD PRESSURE: 109 MMHG | HEIGHT: 65 IN | WEIGHT: 175.4 LBS | DIASTOLIC BLOOD PRESSURE: 63 MMHG

## 2022-01-01 DIAGNOSIS — Z51.5 HOSPICE CARE PATIENT: ICD-10-CM

## 2022-01-01 DIAGNOSIS — F41.1 GENERALIZED ANXIETY DISORDER: ICD-10-CM

## 2022-01-01 DIAGNOSIS — I25.10 CORONARY ARTERY DISEASE INVOLVING NATIVE CORONARY ARTERY OF NATIVE HEART WITHOUT ANGINA PECTORIS: ICD-10-CM

## 2022-01-01 DIAGNOSIS — E11.22 TYPE 2 DIABETES MELLITUS WITH STAGE 3A CHRONIC KIDNEY DISEASE, WITHOUT LONG-TERM CURRENT USE OF INSULIN (H): ICD-10-CM

## 2022-01-01 DIAGNOSIS — N18.31 TYPE 2 DIABETES MELLITUS WITH STAGE 3A CHRONIC KIDNEY DISEASE, WITHOUT LONG-TERM CURRENT USE OF INSULIN (H): ICD-10-CM

## 2022-01-01 DIAGNOSIS — F01.50 VASCULAR DEMENTIA WITHOUT BEHAVIORAL DISTURBANCE (H): ICD-10-CM

## 2022-01-01 DIAGNOSIS — F03.918 UNSPECIFIED DEMENTIA WITH BEHAVIORAL DISTURBANCE: ICD-10-CM

## 2022-01-01 DIAGNOSIS — C54.1 ENDOMETRIAL CANCER (H): ICD-10-CM

## 2022-01-01 DIAGNOSIS — R19.5 LOOSE STOOLS: Primary | ICD-10-CM

## 2022-01-01 DIAGNOSIS — R41.89 COGNITIVE DECLINE: ICD-10-CM

## 2022-01-01 DIAGNOSIS — R73.9 HYPERGLYCEMIA, UNSPECIFIED: ICD-10-CM

## 2022-01-01 DIAGNOSIS — I10 HYPERTENSION, GOAL BELOW 140/90: Primary | ICD-10-CM

## 2022-01-01 DIAGNOSIS — I25.10 CORONARY ARTERY DISEASE INVOLVING NATIVE CORONARY ARTERY OF NATIVE HEART WITHOUT ANGINA PECTORIS: Primary | ICD-10-CM

## 2022-01-01 DIAGNOSIS — I25.10 CORONARY ARTERY DISEASE: ICD-10-CM

## 2022-01-01 DIAGNOSIS — N18.31 STAGE 3A CHRONIC KIDNEY DISEASE (H): ICD-10-CM

## 2022-01-01 DIAGNOSIS — E78.2 MIXED HYPERLIPIDEMIA: ICD-10-CM

## 2022-01-01 DIAGNOSIS — R32 UNSPECIFIED URINARY INCONTINENCE: ICD-10-CM

## 2022-01-01 DIAGNOSIS — E87.6 HYPOKALEMIA: ICD-10-CM

## 2022-01-01 DIAGNOSIS — C54.1 ENDOMETRIAL CANCER (H): Primary | ICD-10-CM

## 2022-01-01 DIAGNOSIS — R53.81 DECLINING FUNCTIONAL STATUS: ICD-10-CM

## 2022-01-01 LAB
ALBUMIN UR-MCNC: ABNORMAL MG/DL
ANION GAP SERPL CALCULATED.3IONS-SCNC: 6 MMOL/L (ref 3–14)
APPEARANCE UR: ABNORMAL
BACTERIA UR CULT: NORMAL
BILIRUB UR QL STRIP: NEGATIVE
BUN SERPL-MCNC: 17 MG/DL (ref 7–30)
CALCIUM SERPL-MCNC: 9.6 MG/DL (ref 8.5–10.1)
CAOX CRY #/AREA URNS HPF: ABNORMAL /HPF
CHLORIDE BLD-SCNC: 111 MMOL/L (ref 94–109)
CHOLEST SERPL-MCNC: 98 MG/DL
CO2 SERPL-SCNC: 25 MMOL/L (ref 20–32)
COLOR UR AUTO: YELLOW
CREAT SERPL-MCNC: 0.77 MG/DL (ref 0.52–1.04)
ERYTHROCYTE [DISTWIDTH] IN BLOOD BY AUTOMATED COUNT: 12.8 % (ref 10–15)
FASTING STATUS PATIENT QL REPORTED: YES
GFR SERPL CREATININE-BSD FRML MDRD: 77 ML/MIN/1.73M2
GLUCOSE BLD-MCNC: 122 MG/DL (ref 70–99)
GLUCOSE UR STRIP-MCNC: NEGATIVE MG/DL
HBA1C MFR BLD: 6.2 % (ref 0–5.6)
HCT VFR BLD AUTO: 36.7 % (ref 35–47)
HDLC SERPL-MCNC: 42 MG/DL
HGB BLD-MCNC: 12.6 G/DL (ref 11.7–15.7)
HGB UR QL STRIP: NEGATIVE
KETONES UR STRIP-MCNC: ABNORMAL MG/DL
LDLC SERPL CALC-MCNC: 41 MG/DL
LEUKOCYTE ESTERASE UR QL STRIP: ABNORMAL
MCH RBC QN AUTO: 32.1 PG (ref 26.5–33)
MCHC RBC AUTO-ENTMCNC: 34.3 G/DL (ref 31.5–36.5)
MCV RBC AUTO: 94 FL (ref 78–100)
MUCOUS THREADS #/AREA URNS LPF: PRESENT /LPF
NITRATE UR QL: NEGATIVE
NONHDLC SERPL-MCNC: 56 MG/DL
PH UR STRIP: 5.5 [PH] (ref 5–7)
PLATELET # BLD AUTO: 227 10E3/UL (ref 150–450)
POTASSIUM BLD-SCNC: 3.2 MMOL/L (ref 3.4–5.3)
POTASSIUM BLD-SCNC: 3.7 MMOL/L (ref 3.4–5.3)
RBC # BLD AUTO: 3.92 10E6/UL (ref 3.8–5.2)
RBC URINE: 7 /HPF
RENAL TUB EPI: 2 /HPF
SODIUM SERPL-SCNC: 142 MMOL/L (ref 133–144)
SP GR UR STRIP: >=1.03 (ref 1–1.03)
SQUAMOUS EPITHELIAL: 1 /HPF
TRIGL SERPL-MCNC: 77 MG/DL
UROBILINOGEN UR STRIP-MCNC: 2 MG/DL
WBC # BLD AUTO: 12.6 10E3/UL (ref 4–11)
WBC URINE: >182 /HPF

## 2022-01-01 PROCEDURE — 87086 URINE CULTURE/COLONY COUNT: CPT | Mod: ORL | Performed by: NURSE PRACTITIONER

## 2022-01-01 PROCEDURE — 85027 COMPLETE CBC AUTOMATED: CPT | Mod: ORL | Performed by: NURSE PRACTITIONER

## 2022-01-01 PROCEDURE — 84132 ASSAY OF SERUM POTASSIUM: CPT | Mod: ORL | Performed by: NURSE PRACTITIONER

## 2022-01-01 PROCEDURE — 83036 HEMOGLOBIN GLYCOSYLATED A1C: CPT | Mod: ORL | Performed by: NURSE PRACTITIONER

## 2022-01-01 PROCEDURE — 36415 COLL VENOUS BLD VENIPUNCTURE: CPT | Mod: ORL | Performed by: NURSE PRACTITIONER

## 2022-01-01 PROCEDURE — 80061 LIPID PANEL: CPT | Mod: ORL | Performed by: NURSE PRACTITIONER

## 2022-01-01 PROCEDURE — 81001 URINALYSIS AUTO W/SCOPE: CPT | Mod: ORL | Performed by: NURSE PRACTITIONER

## 2022-01-01 PROCEDURE — P9604 ONE-WAY ALLOW PRORATED TRIP: HCPCS | Mod: ORL | Performed by: NURSE PRACTITIONER

## 2022-01-01 PROCEDURE — 80048 BASIC METABOLIC PNL TOTAL CA: CPT | Mod: ORL | Performed by: NURSE PRACTITIONER

## 2022-01-01 RX ORDER — LOPERAMIDE HYDROCHLORIDE 2 MG/1
TABLET ORAL
Start: 2022-01-01 | End: 2022-01-01

## 2022-01-01 RX ORDER — GLIPIZIDE 5 MG/1
2.5 TABLET ORAL
Start: 2022-01-01 | End: 2022-01-01

## 2022-01-01 RX ORDER — ASPIRIN 325 MG
TABLET ORAL
Qty: 28 TABLET | Refills: 11 | Status: SHIPPED | OUTPATIENT
Start: 2022-01-01 | End: 2022-01-01

## 2022-01-01 RX ORDER — LOSARTAN POTASSIUM 100 MG/1
TABLET ORAL
Qty: 30 TABLET | Refills: 0
Start: 2022-01-01 | End: 2022-01-01

## 2022-01-01 RX ORDER — ASPIRIN 81 MG/1
81 TABLET, CHEWABLE ORAL DAILY
Start: 2022-01-01 | End: 2022-01-01

## 2022-01-01 RX ORDER — GLIPIZIDE 5 MG/1
5 TABLET ORAL
Start: 2022-01-01 | End: 2022-01-01

## 2022-01-01 RX ORDER — LORAZEPAM 0.5 MG/1
TABLET ORAL
Qty: 10 TABLET | Refills: 5
Start: 2022-01-01

## 2022-01-01 RX ORDER — METOPROLOL TARTRATE 100 MG
50 TABLET ORAL 2 TIMES DAILY
Start: 2022-01-01 | End: 2022-01-01

## 2022-01-01 RX ORDER — DM/PE/ACETAMINOPHEN/CHLORPHENR 10-5-325-2
TABLET, SEQUENTIAL ORAL
Qty: 28 TABLET | Refills: 11 | Status: SHIPPED | OUTPATIENT
Start: 2022-01-01 | End: 2022-01-01

## 2022-01-01 RX ORDER — METOPROLOL TARTRATE 25 MG/1
25 TABLET, FILM COATED ORAL 2 TIMES DAILY
Start: 2022-01-01 | End: 2022-01-01

## 2022-01-01 RX ORDER — ASCORBIC ACID 125 MG
TABLET,CHEWABLE ORAL
Qty: 56 TABLET | Refills: 11 | Status: SHIPPED | OUTPATIENT
Start: 2022-01-01 | End: 2022-01-01

## 2022-01-01 RX ORDER — MORPHINE SULFATE 30 MG/1
5 TABLET ORAL
Refills: 0
Start: 2022-01-01

## 2022-01-01 ASSESSMENT — MIFFLIN-ST. JEOR: SCORE: 1256.49

## 2022-01-14 NOTE — TELEPHONE ENCOUNTER
[facility] Geriatrics Triage Nurse Telephone Encounter    Provider: LEATHA Thompson CNP  Facility: [facility]         Facility Type:  AL    Caller: Ora  Call Back Number: [phone]    Allergies:    Allergies   Allergen Reactions     No Known Drug Allergies         Reason for call: Had a care conference today & pt is weaker & having difficulty getting up & out of chair. Requesting PT & OT Eval & Treat.    Verbal Order/Direction given by Provider: OK.    Provider giving Order:  LEATHA Og CNP    Verbal Order given to: Ora Villarreal RN

## 2022-01-19 NOTE — PROGRESS NOTES
[facility] GERIATRIC SERVICES    Chief Complaint   Patient presents with     RECHECK     HPI:  Loni Ybarra is a 81 year old  (1940), who is being seen today for an episodic care visit [place] [facility] [place] [[id number]]. Today's concern is:     Diagnoses       Codes Comments    Loose stools    -  Primary R19.5     Type 2 diabetes mellitus with stage 3a chronic kidney disease, without long-term current use of insulin (H)     E11.22, N18.31     Stage 3a chronic kidney disease (H)     N18.31     Vascular dementia without behavioral disturbance (H)     F01.50         Nursing brought up to this nurse practitioner that she has been having loose stools to the point that she is not even aware that she is still having an incontinent episode once she gets up.  Staff also feel that she has been progressing in her memory loss as well ever since she has been diagnosed with endometrial cancer.    Had nursing speak to the aids today about how long they think that the loose stools have been occurring.  The one aide thought is had been months and when looking back to see when the Glucophage was started, that was back in October.  Question if it is that medication's side effects is causing her to have loose stools.  Can really determine when the loose stools started.  All staff know as they have had to help her more with cleaning self up.    Allergies, and PMH/PSH reviewed in [facility] today.    Current Outpatient Medications   Medication Sig Dispense Refill     glipiZIDE (GLUCOTROL) 5 MG tablet Take 1 tablet (5 mg) by mouth 2 times daily (before meals)       acetaminophen (TYLENOL) 500 MG tablet Take 500 mg by mouth every 4 hours as needed for mild pain        Ascorbic Acid (VITAMIN C) 500 MG CAPS Take 1 capsule by mouth daily before breakfast       aspirin (ASA) 325 MG tablet Take 1 tablet (325 mg) by mouth daily 30 tablet 0     atorvastatin (LIPITOR) 40 MG tablet Take 1 tablet (40 mg) by mouth daily 30  "tablet 0     Bioflavonoid Products (VITAMIN C) CHEW Take 1 chew tab by mouth daily 30 tablet 0     chlorhexidine (PERIDEX) 0.12 % solution SWISH AND SPIT 15ML IN MOUTH TWICE DAILY 473 mL 11     citalopram (CELEXA) 10 MG tablet Take 1 tablet (10 mg) by mouth daily       LORazepam (ATIVAN) 0.5 MG tablet TAKE 1 TABLET BY MOUTH EVERY 6 HOURS AS NEEDED DX ANXIETY 10 tablet 5     losartan (COZAAR) 100 MG tablet Take 1 tablet (100 mg) by mouth daily 30 tablet 0     medroxyPROGESTERone (PROVERA) 10 MG tablet TAKE 1 TABLET BY MOUTH EVERY DAY 31 tablet 11     metoprolol tartrate (LOPRESSOR) 100 MG tablet Take 1 tablet (100 mg) by mouth 2 times daily       nitroGLYcerin (NITROSTAT) 0.4 MG sublingual tablet Place 1 tablet (0.4 mg) under the tongue every 5 minutes as needed for chest pain 10 tablet 0     Omega-3 Fatty Acids (FISH OIL ADULT GUMMIES) 113.5 MG CHEW Take 444 mg by mouth daily       Vitamin E 100 units TABS Take 100 Units by mouth daily 30 tablet 0         REVIEW OF SYSTEMS:  Limited secondary to cognitive impairment but today pt reports no pain.  Agrees with having the loose stool but no discomfort with stomach.      Objective:   /63   Pulse 63   Temp 98.7  F (37.1  C)   Resp 18   Ht 1.651 m (5' 5\")   Wt 79.6 kg (175 lb 6.4 oz)   LMP  (LMP Unknown)   SpO2 97%   BMI 29.19 kg/m    Glendy was out of her room sitting at the dining room table. Walker parked over by the 1/2 wall and she is sitting in a regular chair.    Glendy is alert but seams dazed and not interactive like she can be.    Coloring is pink, warm and dry skin.  Radial pulse is regular and strong.    No respiratory distress.  No use of oxygen.  Abdomen is soft and non-tender to touch.      Blood sugars:    7am = 110-160's  5pm = 120-190's with a couple of numbers right over 200.        Most Recent 3 CBC's:Recent Labs   Lab Test 11/26/21  1355 11/05/21  0630 10/18/21  1548 09/20/21  0615 09/20/21  0615 12/29/20  0552   WBC  --   --  8.2  --  8.7 " 9.0   HGB 13.0 13.0 13.0   < > 11.8 12.7   MCV  --   --  95  --  94 92   PLT  --   --  194  --  198 183    < > = values in this interval not displayed.     Most Recent 3 BMP's:Recent Labs   Lab Test 11/29/21  0636 11/26/21  1355 11/05/21  0630 10/18/21  1548   NA  --  137 141 139   POTASSIUM  --  3.8 4.2 3.9   CHLORIDE  --  103 110* 107   CO2  --  28 27 28   BUN  --  15 19 19   CR  --  0.95 1.03 0.85   ANIONGAP  --  6 4 4   NANCY  --  9.5 10.1 9.3   * 240* 98 156*       Assessment/Plan:  (R19.5) Loose stools  (primary encounter diagnosis)  Comment: Given that her loose stools have not been brought up to this nurse practitioner until today and have been going on for a few months, question if the metformin is causing this.  Would like to switch her off the metformin onto a different oral agent before adding Imodium to her regimen to help with her loose stools.  Want to see if able to find the cause versus putting a bandaid over the issue.    (E11.22,  N18.31) Type 2 diabetes mellitus with stage 3a chronic kidney disease, without long-term current use of insulin (H)  (N18.31) Stage 3a chronic kidney disease (H)  Comment: blood sugars have responded nicely to the Metformin 1000mg twice a day.  She is not on any other type of oral medication or insulin for her diabetes.  Today we will start a transition of getting her off the metformin and putting her on glipizide.  We will keep in touch with nursing to make sure her blood sugars are doing okay as well as the status of her stools.  1.  Lower metformin to 500 mg twice a day for 2 weeks and then discontinue  2.  Start glipizide 2.5 mg by mouth twice a day for 1 week and then increase to 5 mg twice a day for type 2 diabetes  Plan: glipiZIDE (GLUCOTROL) 5 MG tablet    (F01.50) Vascular dementia without behavioral disturbance (H)  Comment: Did notice that Judyseemed to be a little bit slower with answering questions.  Do not know if she just has a lot on her mind or if  it is just hard time of the day to capture.  We will continue to discuss with nursing what they see in the future and adjust medications accordingly      Orders:  1.  Lower Metformin to 500mg twice a day for 2 weeks then discontinue - question about loose stools  2.  Start Glipizide 2.5mg po BID for 1 weeks and then increase to 5mg twice a day - type 2 DM  3.  Want to change this before adding imodium to her regimen    Electronically signed by: LEATHA Hagen CNP

## 2022-01-19 NOTE — LETTER
1/19/2022        RE: Loni Ybarra  [address]  [place] [address]        [facility] GERIATRIC SERVICES    Chief Complaint   Patient presents with     RECHECK     HPI:  Loni Ybarra is a 81 year old  (1940), who is being seen today for an episodic care visit at: [facility] [place] [[id number]]. Today's concern is:     Diagnoses       Codes Comments    Loose stools    -  Primary R19.5     Type 2 diabetes mellitus with stage 3a chronic kidney disease, without long-term current use of insulin (H)     E11.22, N18.31     Stage 3a chronic kidney disease (H)     N18.31     Vascular dementia without behavioral disturbance (H)     F01.50         Nursing brought up to this nurse practitioner that she has been having loose stools to the point that she is not even aware that she is still having an incontinent episode once she gets up.  Staff also feel that she has been progressing in her memory loss as well ever since she has been diagnosed with endometrial cancer.    Had nursing speak to the aids today about how long they think that the loose stools have been occurring.  The one aide thought is had been months and when looking back to see when the Glucophage was started, that was back in October.  Question if it is that medication's side effects is causing her to have loose stools.  Can really determine when the loose stools started.  All staff know as they have had to help her more with cleaning self up.    Allergies, and PMH/PSH reviewed in EPIC today.    Current Outpatient Medications   Medication Sig Dispense Refill     glipiZIDE (GLUCOTROL) 5 MG tablet Take 1 tablet (5 mg) by mouth 2 times daily (before meals)       acetaminophen (TYLENOL) 500 MG tablet Take 500 mg by mouth every 4 hours as needed for mild pain        Ascorbic Acid (VITAMIN C) 500 MG CAPS Take 1 capsule by mouth daily before breakfast       aspirin (ASA) 325 MG tablet Take 1 tablet (325 mg) by mouth  "daily 30 tablet 0     atorvastatin (LIPITOR) 40 MG tablet Take 1 tablet (40 mg) by mouth daily 30 tablet 0     Bioflavonoid Products (VITAMIN C) CHEW Take 1 chew tab by mouth daily 30 tablet 0     chlorhexidine (PERIDEX) 0.12 % solution SWISH AND SPIT 15ML IN MOUTH TWICE DAILY 473 mL 11     citalopram (CELEXA) 10 MG tablet Take 1 tablet (10 mg) by mouth daily       LORazepam (ATIVAN) 0.5 MG tablet TAKE 1 TABLET BY MOUTH EVERY 6 HOURS AS NEEDED DX ANXIETY 10 tablet 5     losartan (COZAAR) 100 MG tablet Take 1 tablet (100 mg) by mouth daily 30 tablet 0     medroxyPROGESTERone (PROVERA) 10 MG tablet TAKE 1 TABLET BY MOUTH EVERY DAY 31 tablet 11     metoprolol tartrate (LOPRESSOR) 100 MG tablet Take 1 tablet (100 mg) by mouth 2 times daily       nitroGLYcerin (NITROSTAT) 0.4 MG sublingual tablet Place 1 tablet (0.4 mg) under the tongue every 5 minutes as needed for chest pain 10 tablet 0     Omega-3 Fatty Acids (FISH OIL ADULT GUMMIES) 113.5 MG CHEW Take 444 mg by mouth daily       Vitamin E 100 units TABS Take 100 Units by mouth daily 30 tablet 0         REVIEW OF SYSTEMS:  Limited secondary to cognitive impairment but today pt reports no pain.  Agrees with having the loose stool but no discomfort with stomach.      Objective:   /63   Pulse 63   Temp 98.7  F (37.1  C)   Resp 18   Ht 1.651 m (5' 5\")   Wt 79.6 kg (175 lb 6.4 oz)   LMP  (LMP Unknown)   SpO2 97%   BMI 29.19 kg/m    Glendy was out of her room sitting at the dining room table. Walker parked over by the 1/2 wall and she is sitting in a regular chair.    Glendy is alert but seams dazed and not interactive like she can be.    Coloring is pink, warm and dry skin.  Radial pulse is regular and strong.    No respiratory distress.  No use of oxygen.  Abdomen is soft and non-tender to touch.      Blood sugars:    7am = 110-160's  5pm = 120-190's with a couple of numbers right over 200.        Most Recent 3 CBC's:Recent Labs   Lab Test 11/26/21  1355 " 11/05/21  0630 10/18/21  1548 09/20/21  0615 09/20/21  0615 12/29/20  0552   WBC  --   --  8.2  --  8.7 9.0   HGB 13.0 13.0 13.0   < > 11.8 12.7   MCV  --   --  95  --  94 92   PLT  --   --  194  --  198 183    < > = values in this interval not displayed.     Most Recent 3 BMP's:Recent Labs   Lab Test 11/29/21  0636 11/26/21  1355 11/05/21  0630 10/18/21  1548   NA  --  137 141 139   POTASSIUM  --  3.8 4.2 3.9   CHLORIDE  --  103 110* 107   CO2  --  28 27 28   BUN  --  15 19 19   CR  --  0.95 1.03 0.85   ANIONGAP  --  6 4 4   NANCY  --  9.5 10.1 9.3   * 240* 98 156*       Assessment/Plan:  (R19.5) Loose stools  (primary encounter diagnosis)  Comment: Given that her loose stools have not been brought up to this nurse practitioner until today and have been going on for a few months, question if the metformin is causing this.  Would like to switch her off the metformin onto a different oral agent before adding Imodium to her regimen to help with her loose stools.  Want to see if able to find the cause versus putting a bandaid over the issue.    (E11.22,  N18.31) Type 2 diabetes mellitus with stage 3a chronic kidney disease, without long-term current use of insulin (H)  (N18.31) Stage 3a chronic kidney disease (H)  Comment: blood sugars have responded nicely to the Metformin 1000mg twice a day.  She is not on any other type of oral medication or insulin for her diabetes.  Today we will start a transition of getting her off the metformin and putting her on glipizide.  We will keep in touch with nursing to make sure her blood sugars are doing okay as well as the status of her stools.  1.  Lower metformin to 500 mg twice a day for 2 weeks and then discontinue  2.  Start glipizide 2.5 mg by mouth twice a day for 1 week and then increase to 5 mg twice a day for type 2 diabetes  Plan: glipiZIDE (GLUCOTROL) 5 MG tablet    (F01.50) Vascular dementia without behavioral disturbance (H)  Comment: Did notice that Judyseemed to  be a little bit slower with answering questions.  Do not know if she just has a lot on her mind or if it is just hard time of the day to capture.  We will continue to discuss with nursing what they see in the future and adjust medications accordingly      Orders:  1.  Lower Metformin to 500mg twice a day for 2 weeks then discontinue - question about loose stools  2.  Start Glipizide 2.5mg po BID for 1 weeks and then increase to 5mg twice a day - type 2 DM  3.  Want to change this before adding imodium to her regimen    Electronically signed by: LEATHA Hagen CNP             Sincerely,        LEATHA Hagen CNP

## 2022-02-16 NOTE — LETTER
2/16/2022        RE: Loni Ybarra  [address]  [facility] [address]        [facility] GERIATRICS    Chief Complaint   Patient presents with     RECHECK     HPI:  Loni Ybarra is a 81 year old  (1940), who is being seen today for an episodic care visit at: [facility] ([facility]) [[id number]]. Today's concern is:       Diagnoses       Codes Comments    Loose stools    -  Primary R19.5     Coronary artery disease involving native coronary artery of native heart without angina pectoris     I25.10     Mixed hyperlipidemia     E78.2     Type 2 diabetes mellitus with stage 3a chronic kidney disease, without long-term current use of insulin (H)     E11.22, N18.31     Vascular dementia without behavioral disturbance (H)     F01.50     Endometrial cancer (H)     C54.1           Came to see Glendy today as nursing left a note asking for an order for Imodium due to loose stools.  Had been thinking about seeing Glendy regardless because of the last time passing her on the secure unit, there was something about her demeanor that did not seem like herself.    One of the other residents was in Glendy's room, had to see both of them and so the other resident walked out of Glendy's apartment and then this nurse practitioner could see Glendy.  Glendy sat down in her recliner.  Neatly groomed.  But her expressions seem to be more flat.  Right away could tell in her conversation that she was very vague about details.  Typically she could think about things and related to days or weeks but now that is not occurring.  Did asked staff if they thought they have seen a change in Glendy and they agree as well.  Also had the impression from staff that they have had to help her a little bit more with toileting.    Allergies, and PMH/PSH reviewed in [facility] today.    Current Outpatient Medications   Medication Sig Dispense Refill     loperamide (IMODIUM A-D) 2 MG tablet 2mg po every AM and 2mg after each  "loose stool prn.  Max 8x in 24/hr       acetaminophen (TYLENOL) 500 MG tablet Take 500 mg by mouth every 4 hours as needed for mild pain        Ascorbic Acid (VITAMIN C) 500 MG CAPS Take 1 capsule by mouth daily before breakfast       aspirin (ASA) 325 MG tablet TAKE 1 TABLET BY MOUTH EVERY DAY 28 tablet 11     atorvastatin (LIPITOR) 40 MG tablet Take 1 tablet (40 mg) by mouth daily 30 tablet 0     chlorhexidine (PERIDEX) 0.12 % solution SWISH AND SPIT 15ML IN MOUTH TWICE DAILY 473 mL 11     citalopram (CELEXA) 10 MG tablet Take 1 tablet (10 mg) by mouth daily       glipiZIDE (GLUCOTROL) 5 MG tablet Take 1 tablet (5 mg) by mouth 2 times daily (before meals)       GNP VITAMIN C 500 MG CHEW CHEW AND SWALLOW 1 TABLET BY MOUTH EVERY DAY 28 tablet 11     LORazepam (ATIVAN) 0.5 MG tablet TAKE 1 TABLET BY MOUTH EVERY 6 HOURS AS NEEDED DX ANXIETY 10 tablet 5     losartan (COZAAR) 100 MG tablet Take 1 tablet (100 mg) by mouth daily 30 tablet 0     medroxyPROGESTERone (PROVERA) 10 MG tablet TAKE 1 TABLET BY MOUTH EVERY DAY 31 tablet 11     metoprolol tartrate (LOPRESSOR) 100 MG tablet Take 1 tablet (100 mg) by mouth 2 times daily       nitroGLYcerin (NITROSTAT) 0.4 MG sublingual tablet Place 1 tablet (0.4 mg) under the tongue every 5 minutes as needed for chest pain 10 tablet 0     Omega-3 Fatty Acids (FISH OIL ADULT GUMMIES) 113.5 MG CHEW CHEW AND SWALLOW 2 GUMMIES (444MG) BY MOUTH EVERY DAY 56 tablet 11     Vitamin E 100 units TABS Take 100 Units by mouth daily 30 tablet 0       REVIEW OF SYSTEMS:  Limited secondary to cognitive impairment but today pt reports no chest pain, no stomach discomfort, no shortness of breath.    Objective:   /50   Pulse 61   Temp 98.5  F (36.9  C)   Resp 24   Ht 1.651 m (5' 5\")   Wt 75.8 kg (167 lb 1.6 oz)   LMP  (LMP Unknown)   SpO2 94%   BMI 27.81 kg/m    GENERAL APPEARANCE:  Alert, in no distress, cooperative  EYES:  EOM normal, conjunctiva and lids normal, wears corrective " lenses  RESP:  respiratory effort and palpation of chest normal, lungs clear to auscultation , no respiratory distress  CV:  Palpation and auscultation of heart done , regular rate and rhythm, no murmur, rub, or gallop, no edema  ABDOMEN:  normal bowel sounds, soft, nontender, no hepatosplenomegaly or other masses, incontinent of bowel, no guarding or rebound  M/S:   Gait and station normal  SKIN:  pink, dry and warm.  no open areas  PSYCH:  oriented to self and will say her spouses name.  no orientation to time or place, memory impaired , no behaviors. there is not so much kaye in her face.  sentences are not even a complete thought    No recent labs    Assessment/Plan:  (R19.5) Loose stools  (primary encounter diagnosis)  Comment: Glendy does agree that she has loose stools but cannot give any good idea of how often this occurs.  Will proceed to just order Imodium 2 mg every morning and then have available as needed dosing which staff can give during the day.  1.  Imodium 2mg po every AM and 2mg after each loose stool prn with a max of 8x/24H  Plan: loperamide (IMODIUM A-D) 2 MG tablet    (I25.10) Coronary artery disease involving native coronary artery of native heart without angina pectoris  (E78.2) Mixed hyperlipidemia  Comment: Glendy remains on aspirin 325 mg daily as well as omega-3-fish oil 2 gummies/day and atorvastatin 40 mg at bedtime.  Has not had a lipid panel done in a long time and so will have 1 drawn this Friday on the 18th    (E11.22,  N18.31) Type 2 diabetes mellitus with stage 3a chronic kidney disease, without long-term current use of insulin (H)  Comment: Did look at Glendy's blood sugars that are taken twice a day   Range is from 110-280's.     Lab Results   Component Value Date    A1C 8.1 08/09/2021     Has been past 90 days since her last A1c check and so we will have that done again on Friday.    (F01.50) Vascular dementia without behavioral disturbance (H)  Comment: Do not see remarkable change  in Glendy's cognition as her stories are not detailed anymore.  She just agrees with most anything that is being said.  Did review her weights as well.  Current weight is 167 pounds where back in July 2021 she was at 190 pounds.  Some of this could be due to her cancer as well as the changes in her vascular dementia.  We will have a CBC and a BMP done on Friday.  We will also reach out to her spouse to acknowledge today's visit as well as her change in cognition she does take vitamin E 100 international units daily    (C54.1) Endometrial cancer (H)  Comment: We will also follow-up with the  about the endometrial cancer.  Where it was left off last was trying to see if Glendy would want to do surgery as part of her family felt that would be the best thing for her, but her spouse recognized that this stressed out Glendy at the oncology appointment and he feels that it she should be part of the decision.  Do not see where there is any further follow-up with oncology or the GYN doctor that she originally seen.  We will put a note into epic once spouse has been called and spoken with for an update of decision.  Currently Glendy does continue to receive Provera 10 g daily as this was started after her vaginal bleeding.      Orders:  1.  Imodium 2mg po daily in AM and then 2mg after each loose stool prn with max 8x/24H - loose stools  2.  On 2/18 please have  lipid panel, CBC, BMP, A1c drawn for diagnoses of type 2 diabetes, hyperlipidemia, dementia    Electronically signed by: LEATHA Hagen CNP             Sincerely,        LEATHA Hagen CNP

## 2022-02-16 NOTE — PROGRESS NOTES
[facility] GERIATRICS    Chief Complaint   Patient presents with     RECHECK     HPI:  Loni Ybarra is a 81 year old  (1940), who is being seen today for an [place]odic care visit [place] [facility] [place] [[id number]]. Today's concern is:       Diagnoses       Codes Comments    Loose stools    -  Primary R19.5     Coronary artery disease involving native coronary artery of native heart without angina pectoris     I25.10     Mixed hyperlipidemia     E78.2     Type 2 diabetes mellitus with stage 3a chronic kidney disease, without long-term current use of insulin (H)     E11.22, N18.31     Vascular dementia without behavioral disturbance (H)     F01.50     Endometrial cancer (H)     C54.1           Came to see Glendy today as nursing left a note asking for an order for Imodium due to loose stools.  Had been thinking about seeing Glendy regardless because of the last time passing her on the secure unit, there was something about her demeanor that did not seem like herself.    One of the other residents was in Glendy's room, had to see both of them and so the other resident walked out of Glendy's apartment and then this nurse practitioner could see Glendy.  Glendy sat down in her recliner.  Neatly groomed.  But her expressions seem to be more flat.  Right away could tell in her conversation that she was very vague about details.  Typically she could think about things and related to days or weeks but now that is not occurring.  Did asked staff if they thought they have seen a change in Glendy and they agree as well.  Also had the impression from staff that they have had to help her a little bit more with toileting.    Allergies, and PMH/PSH reviewed in [facility] today.    Current Outpatient Medications   Medication Sig Dispense Refill     loperamide (IMODIUM A-D) 2 MG tablet 2mg po every AM and 2mg after each loose stool prn.  Max 8x in 24/hr       acetaminophen (TYLENOL) 500 MG tablet Take 500 mg by  "mouth every 4 hours as needed for mild pain        Ascorbic Acid (VITAMIN C) 500 MG CAPS Take 1 capsule by mouth daily before breakfast       aspirin (ASA) 325 MG tablet TAKE 1 TABLET BY MOUTH EVERY DAY 28 tablet 11     atorvastatin (LIPITOR) 40 MG tablet Take 1 tablet (40 mg) by mouth daily 30 tablet 0     chlorhexidine (PERIDEX) 0.12 % solution SWISH AND SPIT 15ML IN MOUTH TWICE DAILY 473 mL 11     citalopram (CELEXA) 10 MG tablet Take 1 tablet (10 mg) by mouth daily       glipiZIDE (GLUCOTROL) 5 MG tablet Take 1 tablet (5 mg) by mouth 2 times daily (before meals)       GNP VITAMIN C 500 MG CHEW CHEW AND SWALLOW 1 TABLET BY MOUTH EVERY DAY 28 tablet 11     LORazepam (ATIVAN) 0.5 MG tablet TAKE 1 TABLET BY MOUTH EVERY 6 HOURS AS NEEDED DX ANXIETY 10 tablet 5     losartan (COZAAR) 100 MG tablet Take 1 tablet (100 mg) by mouth daily 30 tablet 0     medroxyPROGESTERone (PROVERA) 10 MG tablet TAKE 1 TABLET BY MOUTH EVERY DAY 31 tablet 11     metoprolol tartrate (LOPRESSOR) 100 MG tablet Take 1 tablet (100 mg) by mouth 2 times daily       nitroGLYcerin (NITROSTAT) 0.4 MG sublingual tablet Place 1 tablet (0.4 mg) under the tongue every 5 minutes as needed for chest pain 10 tablet 0     Omega-3 Fatty Acids (FISH OIL ADULT GUMMIES) 113.5 MG CHEW CHEW AND SWALLOW 2 GUMMIES (444MG) BY MOUTH EVERY DAY 56 tablet 11     Vitamin E 100 units TABS Take 100 Units by mouth daily 30 tablet 0       REVIEW OF SYSTEMS:  Limited secondary to cognitive impairment but today pt reports no chest pain, no stomach discomfort, no shortness of breath.    Objective:   /50   Pulse 61   Temp 98.5  F (36.9  C)   Resp 24   Ht 1.651 m (5' 5\")   Wt 75.8 kg (167 lb 1.6 oz)   LMP  (LMP Unknown)   SpO2 94%   BMI 27.81 kg/m    GENERAL APPEARANCE:  Alert, in no distress, cooperative  EYES:  EOM normal, conjunctiva and lids normal, wears corrective lenses  RESP:  respiratory effort and palpation of chest normal, lungs clear to auscultation , no " respiratory distress  CV:  Palpation and auscultation of heart done , regular rate and rhythm, no murmur, rub, or gallop, no edema  ABDOMEN:  normal bowel sounds, soft, nontender, no hepatosplenomegaly or other masses, incontinent of bowel, no guarding or rebound  M/S:   Gait and station normal  SKIN:  pink, dry and warm.  no open areas  PSYCH:  oriented to self and will say her spouses name.  no orientation to time or place, memory impaired , no behaviors. there is not so much kaye in her face.  sentences are not even a complete thought    No recent labs    Assessment/Plan:  (R19.5) Loose stools  (primary encounter diagnosis)  Comment: Glendy does agree that she has loose stools but cannot give any good idea of how often this occurs.  Will proceed to just order Imodium 2 mg every morning and then have available as needed dosing which staff can give during the day.  1.  Imodium 2mg po every AM and 2mg after each loose stool prn with a max of 8x/24H  Plan: loperamide (IMODIUM A-D) 2 MG tablet    (I25.10) Coronary artery disease involving native coronary artery of native heart without angina pectoris  (E78.2) Mixed hyperlipidemia  Comment: Glendy remains on aspirin 325 mg daily as well as omega-3-fish oil 2 gummies/day and atorvastatin 40 mg at bedtime.  Has not had a lipid panel done in a long time and so will have 1 drawn this Friday on the 18th    (E11.22,  N18.31) Type 2 diabetes mellitus with stage 3a chronic kidney disease, without long-term current use of insulin (H)  Comment: Did look at Glendy's blood sugars that are taken twice a day   Range is from 110-280's.     Lab Results   Component Value Date    A1C 8.1 08/09/2021     Has been past 90 days since her last A1c check and so we will have that done again on Friday.    (F01.50) Vascular dementia without behavioral disturbance (H)  Comment: Do not see remarkable change in Glendy's cognition as her stories are not detailed anymore.  She just agrees with most anything  that is being said.  Did review her weights as well.  Current weight is 167 pounds where back in July 2021 she was at 190 pounds.  Some of this could be due to her cancer as well as the changes in her vascular dementia.  We will have a CBC and a BMP done on Friday.  We will also reach out to her spouse to acknowledge today's visit as well as her change in cognition she does take vitamin E 100 international units daily    (C54.1) Endometrial cancer (H)  Comment: We will also follow-up with the  about the endometrial cancer.  Where it was left off last was trying to see if Glendy would want to do surgery as part of her family felt that would be the best thing for her, but her spouse recognized that this stressed out Glendy at the oncology appointment and he feels that it she should be part of the decision.  Do not see where there is any further follow-up with oncology or the GYN doctor that she originally seen.  We will put a note into epic once spouse has been called and spoken with for an update of decision.  Currently Glendy does continue to receive Provera 10 g daily as this was started after her vaginal bleeding.      Orders:  1.  Imodium 2mg po daily in AM and then 2mg after each loose stool prn with max 8x/24H - loose stools  2.  On 2/18 please have  lipid panel, CBC, BMP, A1c drawn for diagnoses of type 2 diabetes, hyperlipidemia, dementia    Electronically signed by: LEATHA Hagen CNP

## 2022-02-22 PROBLEM — C54.1 ENDOMETRIAL CANCER (H): Status: ACTIVE | Noted: 2022-01-01

## 2022-02-22 NOTE — TELEPHONE ENCOUNTER
Spoke with Glendy's  today as this nurse practitioner reached out to him to talk about her cognitive changes as well as what is the status with her endometrial cancer.    As far as the cancer, the last was about just speaking to her about what she would want to do.  Glendy's  did not pursue taking her any further to the oncologist her back to GYN as it seemed that Glendy was nervous about doing this proceeding of the surgery.  Do respect this decision.  She does continue on Provera 10 mg daily as prescribed by GYN doctor at the [facility] due to the vaginal bleeding.  At this time will not be pursuing any further treatment.    Glendy's spouse also recognizes that she has had a tremendous change in her cognition.  He states that he just tries to take a word that she says puts it in the sentence for her as she typically will just agree with what he said.  This does 7 him for the change but wanted him to recognize that others are seeing it as well.    Did ask him in reviewing Agatha's medications if she still needed to be on a vitamin C and he does desire this to occur as long as she is still functionable.  Will remain on vitamin E as well    Her A1c came back pretty close to normal.  Did lower her glipizide to 2.5 mg twice a day yesterday.  So he is up-to-date with that.  Her lipid panel was almost perfect with no significant abnormalities.  We will take her off the Pravachol and the omega-3 at this time.  To list medications for her to take  Glendy spouse brought up about the aspirin.  Currently she is on 325 mg daily did offer to lower this to 81 mg of aspirin a day and he was agreeable with this    Orders;  1.  Discontinue Pravachol and omega-3  2.  Lower aspirin 325 mg daily to 81 mg daily.  3.  Glipizide has been lowered already to 2.5 mg twice a day based on her A1c    Mr. Soriano was appreciative of the phone call and review of medications.  He typically will call this nurse practitioner if he has any  concerns.    Electronically signed by Jackie Hawkins RN, CNP

## 2022-03-09 NOTE — PROGRESS NOTES
[facility] GERIATRICS    Chief Complaint   Patient presents with     RECHECK     HPI:  Loni Ybarra is a 81 year old  (1940), who is being seen today for an episodic care visit [place] [facility] [[id number]]. Today's concern is:     Diagnoses       Codes Comments    Hypertension, goal below 140/90    -  Primary I10     Declining functional status     R53.81     Cognitive decline     R41.89     Vascular dementia without behavioral disturbance (H)     F01.50         Came to see Glendy today as staff are asking for a hospice referral due to continued seeing a decline by staff but also other family besides her spouse.      Found Glendy in her room in the recliner.  Startled her when she woke up from a nap.  She started talking and was very confused.  Calmed her down.  Staff see her sleeping more, conversation is not clear as she does not give details like she use too.  Staff helping more with cares.    Allergies, and PMH/PSH reviewed in [facility] today.    Current Outpatient Medications   Medication Sig Dispense Refill     [START ON 3/10/2022] metoprolol tartrate (LOPRESSOR) 100 MG tablet Take 0.5 tablets (50 mg) by mouth 2 times daily       acetaminophen (TYLENOL) 500 MG tablet Take 500 mg by mouth every 4 hours as needed for mild pain        Ascorbic Acid (VITAMIN C) 500 MG CAPS Take 1 capsule by mouth daily before breakfast       aspirin (ASA) 81 MG chewable tablet Take 1 tablet (81 mg) by mouth daily       chlorhexidine (PERIDEX) 0.12 % solution SWISH AND SPIT 15ML IN MOUTH TWICE DAILY 473 mL 11     citalopram (CELEXA) 10 MG tablet Take 1 tablet (10 mg) by mouth daily       glipiZIDE (GLUCOTROL) 5 MG tablet Take 0.5 tablets (2.5 mg) by mouth 2 times daily (before meals)       GNP VITAMIN C 500 MG CHEW CHEW AND SWALLOW 1 TABLET BY MOUTH EVERY DAY 28 tablet 11     loperamide (IMODIUM A-D) 2 MG tablet 2mg po every AM and 2mg after each loose stool prn.  Max 8x in 24/hr        "LORazepam (ATIVAN) 0.5 MG tablet TAKE 1 TABLET BY MOUTH EVERY 6 HOURS AS NEEDED DX ANXIETY 10 tablet 5     losartan (COZAAR) 100 MG tablet Take 1 tablet (100 mg) by mouth daily 30 tablet 0     medroxyPROGESTERone (PROVERA) 10 MG tablet TAKE 1 TABLET BY MOUTH EVERY DAY 31 tablet 11     nitroGLYcerin (NITROSTAT) 0.4 MG sublingual tablet Place 1 tablet (0.4 mg) under the tongue every 5 minutes as needed for chest pain 10 tablet 0     Vitamin E 100 units TABS Take 100 Units by mouth daily 30 tablet 0       REVIEW OF SYSTEMS:  Limited secondary to cognitive impairment but today pt reports no chest pain, no SOB    Objective:   /50   Pulse 57   Temp 97.1  F (36.2  C)   Resp 16   Ht 1.651 m (5' 5\")   Wt 73.9 kg (163 lb)   LMP  (LMP Unknown)   SpO2 97%   BMI 27.12 kg/m    GENERAL APPEARANCE:  Alert, in no distress, appears healthy, cooperative  EYES:  EOM normal, conjunctiva and lids normal, wears glasses  RESP:  respiratory effort and palpation of chest normal, lungs clear to auscultation , no respiratory distress  CV:  Palpation and auscultation of heart done , regular rate and rhythm, no murmur, rub, or gallop, trace edema  ABDOMEN:  normal bowel sounds, soft, nontender, no hepatosplenomegaly or other masses, no guarding or rebound  M/S:   Gait and station abnormal uses a walker for mobility, able to transfer self  SKIN:  pink, warm and dry.  no open areas  PSYCH:  oriented to person, insight and judgement impaired, memory impaired , affect and mood normal    Blood pressures range from 100's/50's.      Blood sugars 3x day  7am = 110-150's  5pm = 130-180's  HS = 120-180's        Most Recent 3 CBC's:Recent Labs   Lab Test 02/18/22  0640 11/26/21  1355 11/05/21  0630 10/18/21  1548 09/20/21  0615   WBC 12.6*  --   --  8.2 8.7   HGB 12.6 13.0 13.0 13.0 11.8   MCV 94  --   --  95 94     --   --  194 198     Most Recent Hemoglobin A1c:Recent Labs   Lab Test 02/18/22  0640   A1C 6.2* "       Assessment/Plan:  (I10) Hypertension, goal below 140/90  (primary encounter diagnosis)  Comment: blood pressures low.  Is on large doses of Lopressor 100mg twice a day and Cozaar 100mg daily.  Plan: metoprolol tartrate (LOPRESSOR) 100 MG tablet    (R53.81) Declining functional status  (R41.89) Cognitive decline  (F01.50) Vascular dementia without behavioral disturbance (H)  Comment: overall there is a decline seen.  More in cognition but is sleeping more.  Anticipation of incontinence and monitoring for help.  This NP has already spoken to Glendy's spouse recently to let him know of the changes this NP has seen since she has received the dx of the endometrial cancer.  She denies bleeding and continues on Provera.  Spouse has noticed the changes as well.  Now that the other family members see the change, hospice is being discussed.  Will give the order for [facility] to program.  Staff will call the spouse to confirm with him and explain how the hospice program works.      Orders:  Refer to hospice of choice for evaluation for admission due to advancing of cognition.  2.  Lower the Metoprolol 100mg twice a day down to 50mg po twice a day - HNT    Electronically signed by: LEATHA Hagen CNP

## 2022-03-09 NOTE — LETTER
3/9/2022        RE: Loni Ybarra  [address]  [place] [address]        [facility] GERIATRICS    Chief Complaint   Patient presents with     RECHECK     HPI:  Loni Ybarra is a 81 year old  (1940), who is being [place] today for an [place]odic [place] [facility] [place] [[id number]]. Today's concern is:     Diagnoses       Codes Comments    Hypertension, goal below 140/90    -  Primary I10     Declining functional status     R53.81     Cognitive decline     R41.89     Vascular dementia without behavioral disturbance (H)     F01.50         Came to see Glendy today as staff are asking for a hospice referral due to continued seeing a decline by staff but also other family besides her spouse.      Found Glendy in her room in the recliner.  Startled her when she woke up from a nap.  She started talking and was very confused.  Calmed her down.  Staff see her sleeping more, conversation is not clear as she does not give details like she use too.  Staff helping more with cares.    Allergies, and PMH/PSH reviewed in [facility] today.    Current Outpatient Medications   Medication Sig Dispense Refill     [START ON 3/10/2022] metoprolol tartrate (LOPRESSOR) 100 MG tablet Take 0.5 tablets (50 mg) by mouth 2 times daily       acetaminophen (TYLENOL) 500 MG tablet Take 500 mg by mouth every 4 hours as needed for mild pain        Ascorbic Acid (VITAMIN C) 500 MG CAPS Take 1 capsule by mouth daily before breakfast       aspirin (ASA) 81 MG chewable tablet Take 1 tablet (81 mg) by mouth daily       chlorhexidine (PERIDEX) 0.12 % solution SWISH AND SPIT 15ML IN MOUTH TWICE DAILY 473 mL 11     citalopram (CELEXA) 10 MG tablet Take 1 tablet (10 mg) by mouth daily       glipiZIDE (GLUCOTROL) 5 MG tablet Take 0.5 tablets (2.5 mg) by mouth 2 times daily (before meals)       GNP VITAMIN C 500 MG CHEW CHEW AND SWALLOW 1 TABLET BY MOUTH EVERY DAY 28 tablet 11     loperamide (IMODIUM A-D)  "2 MG tablet 2mg po every AM and 2mg after each loose stool prn.  Max 8x in 24/hr       LORazepam (ATIVAN) 0.5 MG tablet TAKE 1 TABLET BY MOUTH EVERY 6 HOURS AS NEEDED DX ANXIETY 10 tablet 5     losartan (COZAAR) 100 MG tablet Take 1 tablet (100 mg) by mouth daily 30 tablet 0     medroxyPROGESTERone (PROVERA) 10 MG tablet TAKE 1 TABLET BY MOUTH EVERY DAY 31 tablet 11     nitroGLYcerin (NITROSTAT) 0.4 MG sublingual tablet Place 1 tablet (0.4 mg) under the tongue every 5 minutes as needed for chest pain 10 tablet 0     Vitamin E 100 units TABS Take 100 Units by mouth daily 30 tablet 0       REVIEW OF SYSTEMS:  Limited secondary to cognitive impairment but today pt reports no chest pain, no SOB    Objective:   /50   Pulse 57   Temp 97.1  F (36.2  C)   Resp 16   Ht 1.651 m (5' 5\")   Wt 73.9 kg (163 lb)   LMP  (LMP Unknown)   SpO2 97%   BMI 27.12 kg/m    GENERAL APPEARANCE:  Alert, in no distress, appears healthy, cooperative  EYES:  EOM normal, conjunctiva and lids normal, wears glasses  RESP:  respiratory effort and palpation of chest normal, lungs clear to auscultation , no respiratory distress  CV:  Palpation and auscultation of heart done , regular rate and rhythm, no murmur, rub, or gallop, trace edema  ABDOMEN:  normal bowel sounds, soft, nontender, no hepatosplenomegaly or other masses, no guarding or rebound  M/S:   Gait and station abnormal uses a walker for mobility, able to transfer self  SKIN:  pink, warm and dry.  no open areas  PSYCH:  oriented to person, insight and judgement impaired, memory impaired , affect and mood normal    Blood pressures range from 100's/50's.      Blood sugars 3x day  7am = 110-150's  5pm = 130-180's  HS = 120-180's        Most Recent 3 CBC's:Recent Labs   Lab Test 02/18/22  0640 11/26/21  1355 11/05/21  0630 10/18/21  1548 09/20/21  0615   WBC 12.6*  --   --  8.2 8.7   HGB 12.6 13.0 13.0 13.0 11.8   MCV 94  --   --  95 94     --   --  194 198     Most Recent " Hemoglobin A1c:Recent Labs   Lab Test 02/18/22  0640   A1C 6.2*       Assessment/Plan:  (I10) Hypertension, goal below 140/90  (primary encounter diagnosis)  Comment: blood pressures low.  Is on large doses of Lopressor 100mg twice a day and Cozaar 100mg daily.  Plan: metoprolol tartrate (LOPRESSOR) 100 MG tablet    (R53.81) Declining functional status  (R41.89) Cognitive decline  (F01.50) Vascular dementia without behavioral disturbance (H)  Comment: overall there is a decline seen.  More in cognition but is sleeping more.  Anticipation of incontinence and monitoring for help.  This NP has already spoken to Glendy's spouse recently to let him know of the changes this NP has seen since she has received the dx of the endometrial cancer.  She denies bleeding and continues on Provera.  Spouse has noticed the changes as well.  Now that the other family members see the change, hospice is being discussed.  Will give the order for [facility] to [facility] of admission to program.  Staff will call the spouse to confirm with him and explain how the hospice program works.      Orders:  Refer to hospice of choice for evaluation for admission due to advancing of cognition.  2.  Lower the Metoprolol 100mg twice a day down to 50mg po twice a day - HNT    Electronically signed by: LEATHA Hagen CNP             Sincerely,        LEATHA Hagen CNP

## 2022-04-06 NOTE — PROGRESS NOTES
[facility] GERIATRICS    Chief Complaint   Patient presents with     RECHECK     HPI:  Loni Ybarra is a 81 year old  (1940), who is being seen today for an episodic care visit at: [facility] [[id number]]. Today's concern is:     Diagnoses       Codes Comments    Hypertension, goal below 140/90    -  Primary I10     Coronary artery disease involving native coronary artery of native heart without angina pectoris     I25.10     Endometrial cancer (H)     C54.1     Vascular dementia without behavioral disturbance (H)     F01.50         Came to see Glenyd today due to staff updating this NP that she continues to show decline in her health.  Staff noted that during the night she had some emesis.  With that said, Glendy slept in a little later.  She was sleeping on her bed with one leg off the bed and she was on her back.  Easily aroused but confused and startled.  Asked about if it was time to go to a party.  Reoriented her to her space.      Do see Glendy move fast through her end of life.  Once she was dx with the endometrial cancer and no treatment to be done, can see from time to time she has lost weight and more confusion already on top of her Dementia.      Allergies, and PMH/PSH reviewed in EPIC today.    Current Outpatient Medications   Medication Sig Dispense Refill     losartan (COZAAR) 100 MG tablet 25mg po daily 30 tablet 0     metoprolol tartrate (LOPRESSOR) 25 MG tablet Take 1 tablet (25 mg) by mouth 2 times daily       acetaminophen (TYLENOL) 500 MG tablet Take 500 mg by mouth every 4 hours as needed for mild pain        chlorhexidine (PERIDEX) 0.12 % solution SWISH AND SPIT 15ML IN MOUTH TWICE DAILY 473 mL 11     citalopram (CELEXA) 10 MG tablet Take 1 tablet (10 mg) by mouth daily       loperamide (IMODIUM A-D) 2 MG tablet 2mg po every AM and 2mg after each loose stool prn.  Max 8x in 24/hr       LORazepam (ATIVAN) 0.5 MG tablet TAKE 1 TABLET BY MOUTH EVERY 6  "HOURS AS NEEDED DX ANXIETY 10 tablet 5     medroxyPROGESTERone (PROVERA) 10 MG tablet TAKE 1 TABLET BY MOUTH EVERY DAY 31 tablet 11     nitroGLYcerin (NITROSTAT) 0.4 MG sublingual tablet Place 1 tablet (0.4 mg) under the tongue every 5 minutes as needed for chest pain 10 tablet 0       REVIEW OF SYSTEMS:  Limited secondary to cognitive impairment but today pt reports she was vomiting during the night and expressed wanting to go have breakfast.    Objective:   /50   Pulse 67   Temp 97.1  F (36.2  C)   Resp 18   Ht 1.651 m (5' 5\")   Wt 73.9 kg (163 lb)   LMP  (LMP Unknown)   SpO2 97%   BMI 27.12 kg/m    GENERAL APPEARANCE:  Alert, in no distress, cooperative  ENT:  can see in her neck that it is much thinner  EYES:  EOM normal, conjunctiva and lids normal, typically wears glasses  RESP:  respiratory effort and palpation of chest normal, lungs clear to auscultation , no respiratory distress  CV:  Palpation and auscultation of heart done , regular rate and rhythm, no murmur, rub, or gallop, no edema  ABDOMEN:  soft, non-tender to touch, round, bowel sounds present  M/S:   Gait and station abnormal uses a walker, later on in the morning she was out at the table folding towels like nothing was wrong  PSYCH:  oriented to self and family, insight and judgement impaired, memory impaired , affect and mood normal    No recent labs    Assessment/Plan:  (I10) Hypertension, goal below 140/90  (primary encounter diagnosis)  Comment: B/P's not done often but are on the low side  100's/50's.  Right now feel comfortable lowering the Cozaar from 50mg to 25mg daily and Lopressor from 50mg to 25mg twice a day.  Gently lower her down and continue to monitor how she progresses through end of life.  She was out in the [facility] area folding towels while standing.    Plan: losartan (COZAAR) 100 MG tablet, metoprolol         tartrate (LOPRESSOR) 25 MG tablet    (I25.10) Coronary artery disease involving native coronary artery of " native heart without angina pectoris  Comment: asked to discontinue her ASA and so will do so.  Slowly have taken away unnecessary medications like the vitamins last week.  Her spouse did not want them to be gone as he feels they help detoxify her but he does not understand that her body is not going to process it anymore.      (C54.1) Endometrial cancer (H)  (F01.50) Vascular dementia without behavioral disturbance (H)  Comment: is attended by [facility].  Hospice fill in RN has chosen to speak with her brother and sister in laws when the spouse is still the POA and has to be informed.  Family dynamics. He may challenge ideas but it is part of trying to understand the process. Do feel everyone has their way of denial and grieving process.  Glendy is furthering in her memory loss that feel she does not know what is occurring around her.  Staff help anticipate more of her needs for her safety.        Orders:  Discontinue ASA  Decrease Losartan to 25mg daily  Decrease the Lopressor to 25mg BID for HTN    Electronically signed by: LEATHA Hagen CNP

## 2022-04-06 NOTE — LETTER
4/6/2022        RE: Loni Ybarra  [address]  [place] [address]        [facility] GERIATRICS    Chief Complaint   Patient presents with     RECHECK     HPI:  Loni Ybarra is a 81 year old  (1940), who is being seen today for an episodic care visit at: [facility] ([facility] [[id number]]. Today's concern is:     Diagnoses       Codes Comments    Hypertension, goal below 140/90    -  Primary I10     Coronary artery disease involving native coronary artery of native heart without angina pectoris     I25.10     Endometrial cancer (H)     C54.1     Vascular dementia without behavioral disturbance (H)     F01.50         Came to see Glendy today due to staff updating this NP that she continues to show decline in her health.  Staff noted that during the night she had some emesis.  With that said, Glendy slept in a little later.  She was sleeping on her bed with one leg off the bed and she was on her back.  Easily aroused but confused and startled.  Asked about if it was time to go to a party.  Reoriented her to her space.      Do see Glendy move fast through her end of life.  Once she was dx with the endometrial cancer and no treatment to be done, can see from time to time she has lost weight and more confusion already on top of her Dementia.      Allergies, and PMH/PSH reviewed in EPIC today.    Current Outpatient Medications   Medication Sig Dispense Refill     losartan (COZAAR) 100 MG tablet 25mg po daily 30 tablet 0     metoprolol tartrate (LOPRESSOR) 25 MG tablet Take 1 tablet (25 mg) by mouth 2 times daily       acetaminophen (TYLENOL) 500 MG tablet Take 500 mg by mouth every 4 hours as needed for mild pain        chlorhexidine (PERIDEX) 0.12 % solution SWISH AND SPIT 15ML IN MOUTH TWICE DAILY 473 mL 11     citalopram (CELEXA) 10 MG tablet Take 1 tablet (10 mg) by mouth daily       loperamide (IMODIUM A-D) 2 MG tablet 2mg po every AM and 2mg after each loose stool  "prn.  Max 8x in 24/hr       LORazepam (ATIVAN) 0.5 MG tablet TAKE 1 TABLET BY MOUTH EVERY 6 HOURS AS NEEDED DX ANXIETY 10 tablet 5     medroxyPROGESTERone (PROVERA) 10 MG tablet TAKE 1 TABLET BY MOUTH EVERY DAY 31 tablet 11     nitroGLYcerin (NITROSTAT) 0.4 MG sublingual tablet Place 1 tablet (0.4 mg) under the tongue every 5 minutes as needed for chest pain 10 tablet 0       REVIEW OF SYSTEMS:  Limited secondary to cognitive impairment but today pt reports she was vomiting during the night and expressed wanting to go have breakfast.    Objective:   /50   Pulse 67   Temp 97.1  F (36.2  C)   Resp 18   Ht 1.651 m (5' 5\")   Wt 73.9 kg (163 lb)   LMP  (LMP Unknown)   SpO2 97%   BMI 27.12 kg/m    GENERAL APPEARANCE:  Alert, in no distress, cooperative  ENT:  can see in her neck that it is much thinner  EYES:  EOM normal, conjunctiva and lids normal, typically wears glasses  RESP:  respiratory effort and palpation of chest normal, lungs clear to auscultation , no respiratory distress  CV:  Palpation and auscultation of heart done , regular rate and rhythm, no murmur, rub, or gallop, no edema  ABDOMEN:  soft, non-tender to touch, round, bowel sounds present  M/S:   Gait and station abnormal uses a walker, later on in the morning she was out at the table folding towels like nothing was wrong  PSYCH:  oriented to self and family, insight and judgement impaired, memory impaired , affect and mood normal    No recent labs    Assessment/Plan:  (I10) Hypertension, goal below 140/90  (primary encounter diagnosis)  Comment: B/P's not done often but are on the low side  100's/50's.  Right now feel comfortable lowering the Cozaar from 50mg to 25mg daily and Lopressor from 50mg to 25mg twice a day.  Gently lower her down and continue to monitor how she progresses through end of life.  She was out in the [facility] area folding towels while standing.    Plan: losartan (COZAAR) 100 MG tablet, metoprolol         tartrate " (LOPRESSOR) 25 MG tablet    (I25.10) Coronary artery disease involving native coronary artery of native heart without angina pectoris  Comment: asked to discontinue her ASA and so will do so.  Slowly have taken away unnecessary medications like the vitamins last week.  Her spouse did not want them to be gone as he feels they help detoxify her but he does not understand that her body is not going to process it anymore.      (C54.1) Endometrial cancer (H)  (F01.50) Vascular dementia without behavioral disturbance (H)  Comment: is attended by [facility].  Hospice fill in RN has chosen to speak with her brother and sister in laws when the spouse is still the POA and has to be informed.  Family dynamics. He may challenge ideas but it is part of trying to understand the process. Do feel everyone has their way of denial and grieving process.  Glendy is furthering in her memory loss that feel she does not know what is occurring around her.  Staff help anticipate more of her needs for her safety.        Orders:  Discontinue ASA  Decrease Losartan to 25mg daily  Decrease the Lopressor to 25mg BID for HTN    Electronically signed by: LEATHA Hagen CNP             Sincerely,        LEATHA Hagen CNP

## 2022-04-11 NOTE — TELEPHONE ENCOUNTER
Resident is transitioning into end of life and difficulty swallowing.  [facility] hospice nurse asked for discontinue of the following:  Celexa  Imodium  telmisartan  Metoprolol  nitroglycerin  mouthwash  K+   And provera    Electronically signed by Jackie Hawkins RN, CNP

## 2022-04-13 NOTE — LETTER
4/13/2022        RE: Loni Ybarra  [address]  [place] [address]        [facility] GERIATRICS    Chief Complaint   Patient presents with     RECHECK     HPI:  Loni Ybarra is a 81 year old  (1940), who is being seen today for an episodic care visit at: [facility] ([facility] [[id number]]. Today's concern is:     Diagnoses       Codes Comments    Endometrial cancer (H)    -  Primary C54.1     Hospice care patient     Z51.5     Vascular dementia without behavioral disturbance (H)     F01.50     Generalized anxiety disorder     F41.1         Came to see Glendy due to change in condition.  This past Monday received a note from pharmacy asking to discontinue most of her medications like Celexa, Provera, Losartan, and Metoprolol to name a few.  Since this past weekend, Glendy has progressed quickly to now being unresponsive.      [facility] is present - A doing cares and nursing [place] calling her spouse to update him on her status as he is not realizing what state she is in.  Basically he was told to come in to see her today.      Currently one of her sister in laws is present and will be staying all day.  She was  to one of Glendy's brothers whom has passed away.  Spent time speaking with her about some family dynamics that have been known for some time.  This NP has cared for Glendy even before she moved to [facility] memory care unit.    She is staying most of the day to be here for Glendy.  She had questions about the dying process.  Regular Hospice RN for facility is back working and so she updated this NP that they are not looking for oxygen supplementation.  There is morphine and Lorazepam solu-tab form available for staff to give.    Sounds like Sunday was probably the last day Glendy took in small bites of food.  Estimate that from that point it would be within a weeks time.  Encouraged her to speak with Glendy as she can still  "hear but can not express anything.  Glendy may also choose to pass away when no one is with her as well.    The niece had called and the sister in law put the phone up to Glendy's ears but Glendy did not make any motion.      Allergies, and PMH/PSH reviewed in EPIC today.    Current Outpatient Medications   Medication Sig Dispense Refill     LORazepam (ATIVAN) 0.5 MG tablet Per hospice - 0.5mg solutab SL every 4 hours prn 10 tablet 5     morphine 5 MG solu-tab Take 1 tablet (5 mg) by mouth every hour as needed for shortness of breath / dyspnea or moderate to severe pain  0       REVIEW OF SYSTEMS:  Unable as Glendy is unresponsive and no signs of response to name or touch.    Objective:   /50   Pulse 57   Temp 98.5  F (36.9  C)   Resp 16   Ht 1.651 m (5' 5\")   Wt 72.6 kg (160 lb)   LMP  (LMP Unknown)   SpO2 95%   BMI 26.63 kg/m    Laying in a hospital bed provided by Hospice.  Able to do cares and then the HHA but the bed into a low position.    Mouth open.  Mouth cares frequently.  Mucosa pink and dry and mouth is clean.  Eyes closed.    Skin is warm and clammy.  No mottling.  Heart rate regular and Pulse is above 100.  6 sec of apnea a minute and need to observe hard to see her stop breathing.  Breathing is not labored nor any gurgling noted.    Bedbound.      Assessment/Plan:  1. Endometrial cancer (H)    2. Hospice care patient    3. Vascular dementia without behavioral disturbance (H)    4. Generalized anxiety disorder      Sister in law stated they were told in December at the Oncology visit that with not doing anything she may have 3-6 months to live.  She has been progressing ever since she expressed to this NP that she did not want heroic measures.  Glendy was a very cheerful woman but as her memory declined she was not able to hold conversations or remember time frames anymore.      Counseled the sister in law in what to expect.  Everyone grieves differently and just want Glendy to be peaceful.    Spouse " was called by Hospice RN for an update and suggestion to do his appointment and then come up today to see her.  He seemed shocked of the state she is in - part of denial and grieving.    At the end of the visit, music therapy or the saad from hospice was present for a visit.    Orders:  1.  Discontinue the regular Lorazepam order and use the solutab order that Hospice provided.  2.  Discontinue Tylenol today   3.  Discontinue TEDs order.    Electronically signed by: LEATHA Hagen CNP             Sincerely,        LEATHA Hagen CNP

## 2022-04-13 NOTE — PROGRESS NOTES
[facility] GERIATRICS    Chief Complaint   Patient presents with     RECHECK     HPI:  Loni Ybarra is a 81 year old  (1940), who is being seen today for an episodic care visit at: [facility] [[id number]]. Today's concern is:     Diagnoses       Codes Comments    Endometrial cancer (H)    -  Primary C54.1     Hospice care patient     Z51.5     Vascular dementia without behavioral disturbance (H)     F01.50     Generalized anxiety disorder     F41.1         Came to see Glendy due to change in condition.  This past Monday received a note from pharmacy asking to discontinue most of her medications like Celexa, Provera, Losartan, and Metoprolol to name a few.  Since this past weekend, Glendy has progressed quickly to now being unresponsive.      [facility] is present - A doing cares [place] spouse to update him on her status as he is not realizing what state she is in.  Basically he was told to come in to see her today.      Currently one of her sister in laws is present and will be staying all day.  She was  to one of Glendy's brothers whom has passed away.  Spent time speaking with her about some family dynamics that have been known for some time.  This NP has cared for Glendy even before she moved to [facility] memory care unit.    She is staying most of the day to be here for Glendy.  She had questions about the dying process.  Regular Hospice RN for facility is back working and so she updated this NP that they are not looking for oxygen supplementation.  There is morphine and Lorazepam solu-tab form available for staff to give.    Sounds like Sunday was probably the last day Glendy took in small bites of food.  Estimate that from that point it would be within a weeks time.  Encouraged her to speak with Glendy as she can still hear but can not express anything.  Glendy may also choose to pass away when no one is with her  "as well.    The niece had called and the sister in law put the phone up to Glendy's ears but Glendy did not make any motion.      Allergies, and PMH/PSH reviewed in [facility] today.    Current Outpatient Medications   Medication Sig Dispense Refill     LORazepam (ATIVAN) 0.5 MG tablet Per hospice - 0.5mg solutab SL every 4 hours prn 10 tablet 5     morphine 5 MG solu-tab Take 1 tablet (5 mg) by mouth every hour as needed for shortness of breath / dyspnea or moderate to severe pain  0       REVIEW OF SYSTEMS:  Unable as Glendy is unresponsive and no signs of response to name or touch.    Objective:   /50   Pulse 57   Temp 98.5  F (36.9  C)   Resp 16   Ht 1.651 m (5' 5\")   Wt 72.6 kg (160 lb)   LMP  (LMP Unknown)   SpO2 95%   BMI 26.63 kg/m    Laying in a hospital bed provided by Hospice.  Able to do cares and then the HHA but the bed into a low position.    Mouth open.  Mouth cares frequently.  Mucosa pink and dry and mouth is clean.  Eyes closed.    Skin is warm and clammy.  No mottling.  Heart rate regular and Pulse is above 100.  6 sec of apnea a minute and need to observe hard to see her stop breathing.  Breathing is not labored nor any gurgling noted.    Bedbound.      Assessment/Plan:  1. Endometrial cancer (H)    2. Hospice care patient    3. Vascular dementia without behavioral disturbance (H)    4. Generalized anxiety disorder      Sister in law stated they were told in December at the Oncology visit that with not doing anything she may have 3-6 months to live.  She has been progressing ever since she expressed to this NP that she did not want heroic measures.  Glendy was a very cheerful woman but as her memory declined she was not able to hold conversations or remember time frames anymore.      Counseled the sister in law in what to expect.  Everyone grieves differently and just want Glendy to be peaceful.    Spouse was called by Hospice RN for an update and suggestion to do his appointment and then come up " today to see her.  He seemed shocked of the state she is in - part of denial and grieving.    At the end of the visit, music therapy or the saad from hospice was present for a visit.    Orders:  1.  Discontinue the regular Lorazepam order and use the solutab order that Hospice provided.  2.  Discontinue Tylenol today   3.  Discontinue TEDs order.    Electronically signed by: LEATHA Hagen CNP

## 2023-01-12 NOTE — TELEPHONE ENCOUNTER
"Requested Prescriptions   Pending Prescriptions Disp Refills     oxybutynin (DITROPAN-XL) 10 MG 24 hr tablet [Pharmacy Med Name: OXYBUTYNIN CHLORIDE ER 10MG TAB KU TABLET] 90 tablet      Sig: TAKE 1 TABLET BY MOUTH DAILY    Muscarinic Antagonists (Urinary Incontinence Agents) Passed    9/19/2018  4:01 PM       Passed - Recent (12 mo) or future (30 days) visit within the authorizing provider's specialty    Patient had office visit in the last 12 months or has a visit in the next 30 days with authorizing provider or within the authorizing provider's specialty.  See \"Patient Info\" tab in inbasket, or \"Choose Columns\" in Meds & Orders section of the refill encounter.           Passed - Medication is Oxybutynin and patient is 5 years of age or older       Passed - Patient does not have a diagnosis of glaucoma on the problem list    If glaucoma diagnosis is new, refer refill to physician.         Passed - Patient is 18 years of age or older        metoprolol succinate (TOPROL-XL) 50 MG 24 hr tablet [Pharmacy Med Name: METOPROLOL ER 50MG TABLET] 90 tablet      Sig: TAKE 1 TABLET BY MOUTH DAILY    Beta-Blockers Protocol Passed    9/19/2018  4:01 PM       Passed - Blood pressure under 140/90 in past 12 months    BP Readings from Last 3 Encounters:   05/25/18 118/62   09/12/17 126/80   09/23/16 122/60          Passed - Patient is age 6 or older       Passed - Recent (12 mo) or future (30 days) visit within the authorizing provider's specialty    Patient had office visit in the last 12 months or has a visit in the next 30 days with authorizing provider or within the authorizing provider's specialty.  See \"Patient Info\" tab in inbasket, or \"Choose Columns\" in Meds & Orders section of the refill encounter.            oxybutynin (DITROPAN-XL) 10 MG 24 hr tablet  Prescription approved per [facility] Refill Protocol.    metoprolol (TOPROL-XL) 50 MG 24 hr tablet (Discontinued)  Routing refill request to provider for review/approval " because:  Drug not active on patient's medication list  A break in medication, stopped 12/15/2017    Due for 6 month OV 11/25/2018.   Please assist with scheduling.    Nikki Raya RN, BSN        Itraconazole Pregnancy And Lactation Text: This medication is Pregnancy Category C and it isn't know if it is safe during pregnancy. It is also excreted in breast milk.

## 2023-08-21 NOTE — INTERVAL H&P NOTE
I have reviewed the surgical (or preoperative) H&P that is linked to this encounter, and examined the patient. There are no significant changes   Protocol For Photochemotherapy: Triamcinolone Ointment And Nbuvb: The patient received Photochemotherapy: Triamcinolone and NBUVB (triamcinolone ointment applied to all lesions prior to phototherapy).
